# Patient Record
Sex: FEMALE | Race: WHITE | NOT HISPANIC OR LATINO | Employment: OTHER | URBAN - METROPOLITAN AREA
[De-identification: names, ages, dates, MRNs, and addresses within clinical notes are randomized per-mention and may not be internally consistent; named-entity substitution may affect disease eponyms.]

---

## 2017-01-25 ENCOUNTER — OFFICE VISIT (OUTPATIENT)
Dept: CARDIOLOGY | Facility: MEDICAL CENTER | Age: 82
End: 2017-01-25
Payer: MEDICARE

## 2017-01-25 VITALS
OXYGEN SATURATION: 96 % | BODY MASS INDEX: 29.83 KG/M2 | DIASTOLIC BLOOD PRESSURE: 90 MMHG | SYSTOLIC BLOOD PRESSURE: 178 MMHG | WEIGHT: 158 LBS | HEART RATE: 54 BPM | HEIGHT: 61 IN

## 2017-01-25 DIAGNOSIS — E78.2 MIXED HYPERLIPIDEMIA: ICD-10-CM

## 2017-01-25 DIAGNOSIS — Z95.5 STENTED CORONARY ARTERY: ICD-10-CM

## 2017-01-25 DIAGNOSIS — I10 BENIGN ESSENTIAL HYPERTENSION: ICD-10-CM

## 2017-01-25 DIAGNOSIS — I34.0 NON-RHEUMATIC MITRAL REGURGITATION: ICD-10-CM

## 2017-01-25 DIAGNOSIS — I25.10 CORONARY ARTERIOSCLEROSIS: ICD-10-CM

## 2017-01-25 PROCEDURE — G8598 ASA/ANTIPLAT THER USED: HCPCS | Performed by: INTERNAL MEDICINE

## 2017-01-25 PROCEDURE — G8420 CALC BMI NORM PARAMETERS: HCPCS | Performed by: INTERNAL MEDICINE

## 2017-01-25 PROCEDURE — 1101F PT FALLS ASSESS-DOCD LE1/YR: CPT | Mod: 8P | Performed by: INTERNAL MEDICINE

## 2017-01-25 PROCEDURE — G8432 DEP SCR NOT DOC, RNG: HCPCS | Performed by: INTERNAL MEDICINE

## 2017-01-25 PROCEDURE — 1036F TOBACCO NON-USER: CPT | Performed by: INTERNAL MEDICINE

## 2017-01-25 PROCEDURE — 99214 OFFICE O/P EST MOD 30 MIN: CPT | Performed by: INTERNAL MEDICINE

## 2017-01-25 PROCEDURE — G8484 FLU IMMUNIZE NO ADMIN: HCPCS | Performed by: INTERNAL MEDICINE

## 2017-01-25 PROCEDURE — 4040F PNEUMOC VAC/ADMIN/RCVD: CPT | Mod: 8P | Performed by: INTERNAL MEDICINE

## 2017-01-25 ASSESSMENT — ENCOUNTER SYMPTOMS
WEAKNESS: 0
NEUROLOGICAL NEGATIVE: 1
PND: 0
STRIDOR: 0
SHORTNESS OF BREATH: 0
CONSTITUTIONAL NEGATIVE: 1
CHILLS: 0
GASTROINTESTINAL NEGATIVE: 1
LOSS OF CONSCIOUSNESS: 0
HEMOPTYSIS: 0
SORE THROAT: 0
CARDIOVASCULAR NEGATIVE: 1
MUSCULOSKELETAL NEGATIVE: 1
PALPITATIONS: 0
RESPIRATORY NEGATIVE: 1
WHEEZING: 0
FEVER: 0
CLAUDICATION: 0
EYES NEGATIVE: 1
ORTHOPNEA: 0
DIZZINESS: 0
BRUISES/BLEEDS EASILY: 0
SPUTUM PRODUCTION: 0
COUGH: 0

## 2017-01-25 NOTE — Clinical Note
[facility]   [address]  [place] [address]  Phone: [phone]  Fax: [phone]              Arianne Irving  11/3/1931    Encounter Date: 1/25/2017    Alonzo Flor M.D.          PROGRESS NOTE:  Subjective:   Arianne Irving is a 85 y.o. female who presents today as follow-up for her hypertension hyperlipidemia and coronary artery disease. Since she was last seen she was also have some labs checked which were never done. She is also concerned about her blood pressure being mildly elevated today. I rechecked the blood pressure which was 164/90. Her creatinine at her last lab check was 2.0 which was different from her baseline which was normal before. She's never been told that she has anything wrong with her kidneys. His chest pain palpitations PND orthopnea or lower extremity edema.    Past Medical History   Diagnosis Date   • HTN (hypertension)    • HLD (hyperlipidemia)    • Obesity     • Edema     • CAD (coronary artery disease) May 2002     PCI/stent (Penta 3.0 x 28mm) to the LAD.     Past Surgical History   Procedure Laterality Date   • Hysterectomy, total abdominal     • Rotator cuff repair     • Tonsillectomy and adenoidectomy     • Knee arthroscopy       Family History   Problem Relation Age of Onset   • Heart Disease Brother      History   Smoking status   • Former Smoker   • Types: Cigarettes   • Quit date: 01/01/1967   Smokeless tobacco   • Never Used     Allergies   Allergen Reactions   • Nkda [No Known Drug Allergy]      Outpatient Encounter Prescriptions as of 1/25/2017   Medication Sig Dispense Refill   • potassium chloride SA (K-DUR) 20 MEQ Tab CR Take 1 Tab by mouth every day. 90 Tab 3   • losartan-hydrochlorothiazide (HYZAAR) 100-12.5 MG per tablet Take 1 Tab by mouth every day. 90 Tab 3   • metoprolol SR (TOPROL XL) 25 MG TABLET SR 24 HR Take 1 Tab by mouth every day. 90 Tab 3   • Nisoldipine 17 MG TABLET SR 24 HR TAKE 1 TABLET  "BY MOUTH EVERY DAY 90 Tab 3   • meloxicam (MOBIC) 15 MG tablet      • trazodone (DESYREL) 50 MG Tab      • rivastigmine (EXELON) 9.5 MG/24HR patch      • NAMENDA XR 28 MG CAPSULE SR 24 HR      • estradiol (ESTRACE) 0.5 MG tablet Take 0.5 mg by mouth every day.     • nitroglycerin (NITROSTAT) 0.4 MG SUBL Place 1 Tab under tongue as needed for Chest Pain. 25 Tab 3   • aspirin (ASA) 81 MG CHEW Take 81 mg by mouth every day.     • esomeprazole (NEXIUM) 40 MG capsule Take 40 mg by mouth every morning before breakfast.     • paroxetine (PAXIL) 10 MG TABS Take 10 mg by mouth every day. Take with food        No facility-administered encounter medications on file as of 1/25/2017.     Review of Systems   Constitutional: Negative.  Negative for fever, chills and malaise/fatigue.   HENT: Negative.  Negative for sore throat.    Eyes: Negative.    Respiratory: Negative.  Negative for cough, hemoptysis, sputum production, shortness of breath, wheezing and stridor.    Cardiovascular: Negative.  Negative for chest pain, palpitations, orthopnea, claudication, leg swelling and PND.   Gastrointestinal: Negative.    Genitourinary: Negative.    Musculoskeletal: Negative.    Skin: Negative.    Neurological: Negative.  Negative for dizziness, loss of consciousness and weakness.   Endo/Heme/Allergies: Negative.  Does not bruise/bleed easily.   All other systems reviewed and are negative.       Objective:   /90 mmHg  Pulse 54  Ht 1.549 m (5' 1\")  Wt 71.668 kg (158 lb)  BMI 29.87 kg/m2  SpO2 96%    Physical Exam   Constitutional: She is oriented to person, place, and time. She appears well-developed and well-nourished. No distress.   HENT:   Head: Normocephalic.   Mouth/Throat: Oropharynx is clear and moist.   Eyes: EOM are normal. Pupils are equal, round, and reactive to light. Right eye exhibits no discharge. Left eye exhibits no discharge. No scleral icterus.   Neck: Normal range of motion. Neck supple. No JVD present. No " tracheal deviation present.   Cardiovascular: Normal rate, regular rhythm, S1 normal, S2 normal, normal heart sounds, intact distal pulses and normal pulses.  Exam reveals no gallop, no S3, no S4 and no friction rub.    No murmur heard.   No systolic murmur is present    No diastolic murmur is present   Pulses:       Carotid pulses are 2+ on the right side, and 2+ on the left side.       Radial pulses are 2+ on the right side, and 2+ on the left side.        Dorsalis pedis pulses are 2+ on the right side, and 2+ on the left side.        Posterior tibial pulses are 2+ on the right side, and 2+ on the left side.   Pulmonary/Chest: Effort normal and breath sounds normal. No respiratory distress. She has no wheezes. She has no rales.   Abdominal: Soft. Bowel sounds are normal. She exhibits no distension and no mass. There is no tenderness. There is no rebound and no guarding.   Musculoskeletal: She exhibits no edema.   Neurological: She is alert and oriented to person, place, and time. No cranial nerve deficit.   Skin: Skin is warm and dry. She is not diaphoretic. No pallor.   Psychiatric: She has a normal mood and affect. Her behavior is normal. Judgment and thought content normal.   Nursing note and vitals reviewed.      Assessment:     1. Non-rheumatic mitral regurgitation  BASIC METABOLIC PANEL    LIPID PROFILE    CBC W/ DIFF W/O PLATELETS   2. Stented coronary artery  BASIC METABOLIC PANEL    LIPID PROFILE    CBC W/ DIFF W/O PLATELETS   3. Benign essential hypertension  BASIC METABOLIC PANEL    LIPID PROFILE    CBC W/ DIFF W/O PLATELETS   4. Mixed hyperlipidemia     5. Coronary arteriosclerosis         Medical Decision Making:  Today's Assessment / Status / Plan:     85-year-old female with CAD status post stent with some mild dementia and high blood pressure today. I did ask him to start checking her blood pressure twice per day. We will also check some baseline labs. We will have a discussion about the risks and  benefits of cholesterol lowering medications at her next visit.    Thank for you allowing me to take part in your patient's care, please call should you have any questions or would like to discuss this patient.      Elli Dickerson M.D.  [address] [facility]  [address] [address]  VIA Facsimile: [phone]

## 2017-01-25 NOTE — MR AVS SNAPSHOT
"        Arianne Irving   2017 4:00 PM   Office Visit   MRN: [id number]    Department:  [facility]   Dept Phone:  [phone]    Description:  Female : 11/3/1931   Provider:  Alonzo Flor M.D.           Reason for Visit     New Patient           Allergies as of 2017     Allergen Noted Reactions    Nkda [No Known Drug Allergy] 2011         You were diagnosed with     Non-rheumatic mitral regurgitation   [524688]       Stented coronary artery   [141108]       Benign essential hypertension   [226880]       Mixed hyperlipidemia   [272.2.ICD-9-CM]       Coronary arteriosclerosis   [408866]         Vital Signs     Blood Pressure Pulse Height Weight Body Mass Index Oxygen Saturation    178/90 mmHg 54 1.549 m (5' 1\") 71.668 kg (158 lb) 29.87 kg/m2 96%    Smoking Status                   Former Smoker           Basic Information     Date Of Birth Sex Race Ethnicity Preferred Language    11/3/1931 Female White Non- English      Problem List              ICD-10-CM Priority Class Noted - Resolved    Benign essential hypertension I10 High  2011 - Present    Mixed hyperlipidemia E78.2 High  2011 - Present    Coronary arteriosclerosis I25.10 High  2011 - Present    Obesity E66.9 High  2011 - Present    Edema R60.9 High  2011 - Present    Mitral regurgitation I34.0 High  2012 - Present    Jaw pain R68.84   2013 - Present    Stented coronary artery Z95.5   3/3/2016 - Present      Health Maintenance        Date Due Completion Dates    IMM DTaP/Tdap/Td Vaccine (1 - Tdap) 11/3/1950 ---    PAP SMEAR 11/3/1952 ---    COLONOSCOPY 11/3/1981 ---    IMM ZOSTER VACCINE 11/3/1991 ---    BONE DENSITY 11/3/1996 ---    IMM PNEUMOCOCCAL 65+ (ADULT) LOW/MEDIUM RISK SERIES (1 of 2 - PCV13) 11/3/1996 ---    MAMMOGRAM 2009, 2008, 2006, 5/3/2005    IMM INFLUENZA (1) 2016 ---            Current Immunizations     No immunizations on file.      Below " and/or attached are the medications your provider expects you to take. Review all of your home medications and newly ordered medications with your provider and/or pharmacist. Follow medication instructions as directed by your provider and/or pharmacist. Please keep your medication list with you and share with your provider. Update the information when medications are discontinued, doses are changed, or new medications (including over-the-counter products) are added; and carry medication information at all times in the event of emergency situations     Allergies:  NKDA - (reactions not documented)               Medications  Valid as of: January 25, 2017 -  4:30 PM    Generic Name Brand Name Tablet Size Instructions for use    Aspirin (Chew Tab) ASA 81 MG Take 81 mg by mouth every day.        Esomeprazole Magnesium (CAPSULE DELAYED RELEASE) NEXIUM 40 MG Take 40 mg by mouth every morning before breakfast.        Estradiol (Tab) ESTRACE 0.5 MG Take 0.5 mg by mouth every day.        Losartan Potassium-HCTZ (Tab) HYZAAR 100-12.5 MG Take 1 Tab by mouth every day.        Meloxicam (Tab) MOBIC 15 MG         Memantine HCl (CAPSULE SR 24 HR) NAMENDA XR 28 MG         Metoprolol Succinate (TABLET SR 24 HR) TOPROL XL 25 MG Take 1 Tab by mouth every day.        Nisoldipine (TABLET SR 24 HR) Nisoldipine 17 MG TAKE 1 TABLET BY MOUTH EVERY DAY        Nitroglycerin (SL Tab) NITROSTAT 0.4 MG Place 1 Tab under tongue as needed for Chest Pain.        PARoxetine HCl (Tab) PAXIL 10 MG Take 10 mg by mouth every day. Take with food         Potassium Chloride Alysha CR (Tab CR) Kdur 20 MEQ Take 1 Tab by mouth every day.        Rivastigmine (PATCH 24 HR) EXELON 9.5 MG/24HR         TraZODone HCl (Tab) DESYREL 50 MG         .                 Medicines prescribed today were sent to:     [facility] [place] - [address]    [facility] [place] [address]    Phone: [phone] Fax: [phone]    Open 24 Hours?: No     [facility] [address] [facility] - [address]  AT [facility] [address]    [address] [place] [address]    Phone: [phone] Fax: [phone]    Open 24 Hours?: No      Medication refill instructions:       If your prescription bottle indicates you have medication refills left, it is not necessary to call your provider’s office. Please contact your pharmacy and they will refill your medication.    If your prescription bottle indicates you do not have any refills left, you may request refills at any time through one of the following ways: The online [facility] system (except Urgent Care), by calling your provider’s office, or by asking your pharmacy to contact your provider’s office with a refill request. Medication refills are processed only during regular business hours and may not be available until the next business day. Your provider may request additional information or to have a follow-up visit with you prior to refilling your medication.   *Please Note: Medication refills are assigned a new Rx number when refilled electronically. Your pharmacy may indicate that no refills were authorized even though a new prescription for the same medication is available at the pharmacy. Please request the medicine by name with the pharmacy before contacting your provider for a refill.        Your To Do List     Future Labs/Procedures Complete By Expires    BASIC METABOLIC PANEL  As directed 1/26/2018    CBC W/ DIFF W/O PLATELETS  As directed 1/26/2018    LIPID PROFILE  As directed 1/26/2018         [facility] Access Code: Activation code not generated  Current [facility] Status: Active

## 2017-01-26 NOTE — PROGRESS NOTES
Subjective:   Arianne Irving is a 85 y.o. female who presents today as follow-up for her hypertension hyperlipidemia and coronary artery disease. Since she was last seen she was also have some labs checked which were never done. She is also concerned about her blood pressure being mildly elevated today. I rechecked the blood pressure which was 164/90. Her creatinine at her last lab check was 2.0 which was different from her baseline which was normal before. She's never been told that she has anything wrong with her kidneys. His chest pain palpitations PND orthopnea or lower extremity edema.    Past Medical History   Diagnosis Date   • HTN (hypertension)    • HLD (hyperlipidemia)    • Obesity     • Edema     • CAD (coronary artery disease) May 2002     PCI/stent (Penta 3.0 x 28mm) to the LAD.     Past Surgical History   Procedure Laterality Date   • Hysterectomy, total abdominal     • Rotator cuff repair     • Tonsillectomy and adenoidectomy     • Knee arthroscopy       Family History   Problem Relation Age of Onset   • Heart Disease Brother      History   Smoking status   • Former Smoker   • Types: Cigarettes   • Quit date: 01/01/1967   Smokeless tobacco   • Never Used     Allergies   Allergen Reactions   • Nkda [No Known Drug Allergy]      Outpatient Encounter Prescriptions as of 1/25/2017   Medication Sig Dispense Refill   • potassium chloride SA (K-DUR) 20 MEQ Tab CR Take 1 Tab by mouth every day. 90 Tab 3   • losartan-hydrochlorothiazide (HYZAAR) 100-12.5 MG per tablet Take 1 Tab by mouth every day. 90 Tab 3   • metoprolol SR (TOPROL XL) 25 MG TABLET SR 24 HR Take 1 Tab by mouth every day. 90 Tab 3   • Nisoldipine 17 MG TABLET SR 24 HR TAKE 1 TABLET BY MOUTH EVERY DAY 90 Tab 3   • meloxicam (MOBIC) 15 MG tablet      • trazodone (DESYREL) 50 MG Tab      • rivastigmine (EXELON) 9.5 MG/24HR patch      • NAMENDA XR 28 MG CAPSULE SR 24 HR      • estradiol (ESTRACE) 0.5 MG tablet Take 0.5 mg by mouth every day.     •  "nitroglycerin (NITROSTAT) 0.4 MG SUBL Place 1 Tab under tongue as needed for Chest Pain. 25 Tab 3   • aspirin (ASA) 81 MG CHEW Take 81 mg by mouth every day.     • esomeprazole (NEXIUM) 40 MG capsule Take 40 mg by mouth every morning before breakfast.     • paroxetine (PAXIL) 10 MG TABS Take 10 mg by mouth every day. Take with food        No facility-administered encounter medications on file as of 1/25/2017.     Review of Systems   Constitutional: Negative.  Negative for fever, chills and malaise/fatigue.   HENT: Negative.  Negative for sore throat.    Eyes: Negative.    Respiratory: Negative.  Negative for cough, hemoptysis, sputum production, shortness of breath, wheezing and stridor.    Cardiovascular: Negative.  Negative for chest pain, palpitations, orthopnea, claudication, leg swelling and PND.   Gastrointestinal: Negative.    Genitourinary: Negative.    Musculoskeletal: Negative.    Skin: Negative.    Neurological: Negative.  Negative for dizziness, loss of consciousness and weakness.   Endo/Heme/Allergies: Negative.  Does not bruise/bleed easily.   All other systems reviewed and are negative.       Objective:   /90 mmHg  Pulse 54  Ht 1.549 m (5' 1\")  Wt 71.668 kg (158 lb)  BMI 29.87 kg/m2  SpO2 96%    Physical Exam   Constitutional: She is oriented to person, place, and time. She appears well-developed and well-nourished. No distress.   HENT:   Head: Normocephalic.   Mouth/Throat: Oropharynx is clear and moist.   Eyes: EOM are normal. Pupils are equal, round, and reactive to light. Right eye exhibits no discharge. Left eye exhibits no discharge. No scleral icterus.   Neck: Normal range of motion. Neck supple. No JVD present. No tracheal deviation present.   Cardiovascular: Normal rate, regular rhythm, S1 normal, S2 normal, normal heart sounds, intact distal pulses and normal pulses.  Exam reveals no gallop, no S3, no S4 and no friction rub.    No murmur heard.   No systolic murmur is present    " No diastolic murmur is present   Pulses:       Carotid pulses are 2+ on the right side, and 2+ on the left side.       Radial pulses are 2+ on the right side, and 2+ on the left side.        Dorsalis pedis pulses are 2+ on the right side, and 2+ on the left side.        Posterior tibial pulses are 2+ on the right side, and 2+ on the left side.   Pulmonary/Chest: Effort normal and breath sounds normal. No respiratory distress. She has no wheezes. She has no rales.   Abdominal: Soft. Bowel sounds are normal. She exhibits no distension and no mass. There is no tenderness. There is no rebound and no guarding.   Musculoskeletal: She exhibits no edema.   Neurological: She is alert and oriented to person, place, and time. No cranial nerve deficit.   Skin: Skin is warm and dry. She is not diaphoretic. No pallor.   Psychiatric: She has a normal mood and affect. Her behavior is normal. Judgment and thought content normal.   Nursing note and vitals reviewed.      Assessment:     1. Non-rheumatic mitral regurgitation  BASIC METABOLIC PANEL    LIPID PROFILE    CBC W/ DIFF W/O PLATELETS   2. Stented coronary artery  BASIC METABOLIC PANEL    LIPID PROFILE    CBC W/ DIFF W/O PLATELETS   3. Benign essential hypertension  BASIC METABOLIC PANEL    LIPID PROFILE    CBC W/ DIFF W/O PLATELETS   4. Mixed hyperlipidemia     5. Coronary arteriosclerosis         Medical Decision Making:  Today's Assessment / Status / Plan:     85-year-old female with CAD status post stent with some mild dementia and high blood pressure today. I did ask him to start checking her blood pressure twice per day. We will also check some baseline labs. We will have a discussion about the risks and benefits of cholesterol lowering medications at her next visit.    Thank for you allowing me to take part in your patient's care, please call should you have any questions or would like to discuss this patient.

## 2017-02-15 ENCOUNTER — HOSPITAL ENCOUNTER (OUTPATIENT)
Dept: LAB | Facility: MEDICAL CENTER | Age: 82
End: 2017-02-15
Attending: INTERNAL MEDICINE
Payer: MEDICARE

## 2017-02-15 DIAGNOSIS — I10 BENIGN ESSENTIAL HYPERTENSION: ICD-10-CM

## 2017-02-15 DIAGNOSIS — Z95.5 STENTED CORONARY ARTERY: ICD-10-CM

## 2017-02-15 DIAGNOSIS — I34.0 NON-RHEUMATIC MITRAL REGURGITATION: ICD-10-CM

## 2017-02-15 LAB
ANION GAP SERPL CALC-SCNC: 11 MMOL/L (ref 0–11.9)
BASOPHILS # BLD AUTO: 0.08 K/UL (ref 0–0.12)
BASOPHILS NFR BLD AUTO: 1.2 % (ref 0–1.8)
BUN SERPL-MCNC: 38 MG/DL (ref 8–22)
CALCIUM SERPL-MCNC: 9.9 MG/DL (ref 8.5–10.5)
CHLORIDE SERPL-SCNC: 100 MMOL/L (ref 96–112)
CHOLEST SERPL-MCNC: 277 MG/DL (ref 100–199)
CO2 SERPL-SCNC: 28 MMOL/L (ref 20–33)
CREAT SERPL-MCNC: 1.42 MG/DL (ref 0.5–1.4)
EOSINOPHIL # BLD: 0.15 K/UL (ref 0–0.51)
EOSINOPHIL NFR BLD AUTO: 2.2 % (ref 0–6.9)
ERYTHROCYTE [DISTWIDTH] IN BLOOD BY AUTOMATED COUNT: 42.5 FL (ref 35.9–50)
GLUCOSE SERPL-MCNC: 103 MG/DL (ref 65–99)
HCT VFR BLD AUTO: 40 % (ref 37–47)
HDLC SERPL-MCNC: 63 MG/DL
HGB BLD-MCNC: 13.4 G/DL (ref 12–16)
IMM GRANULOCYTES # BLD AUTO: 0.02 K/UL (ref 0–0.11)
IMM GRANULOCYTES NFR BLD AUTO: 0.3 % (ref 0–0.9)
LDLC SERPL CALC-MCNC: 184 MG/DL
LYMPHOCYTES # BLD: 1.8 K/UL (ref 1–4.8)
LYMPHOCYTES NFR BLD AUTO: 26.7 % (ref 22–41)
MCH RBC QN AUTO: 30.6 PG (ref 27–33)
MCHC RBC AUTO-ENTMCNC: 33.5 G/DL (ref 33.6–35)
MCV RBC AUTO: 91.3 FL (ref 81.4–97.8)
MONOCYTES # BLD: 0.51 K/UL (ref 0–0.85)
MONOCYTES NFR BLD AUTO: 7.6 % (ref 0–13.4)
NEUTROPHILS # BLD: 4.18 K/UL (ref 2–7.15)
NEUTROPHILS NFR BLD AUTO: 62 % (ref 44–72)
NRBC # BLD AUTO: 0 K/UL
NRBC BLD-RTO: 0 /100 WBC
POTASSIUM SERPL-SCNC: 3.4 MMOL/L (ref 3.6–5.5)
RBC # BLD AUTO: 4.38 M/UL (ref 4.2–5.4)
SODIUM SERPL-SCNC: 139 MMOL/L (ref 135–145)
TRIGL SERPL-MCNC: 152 MG/DL (ref 0–149)
WBC # BLD AUTO: 6.7 K/UL (ref 4.8–10.8)

## 2017-02-15 PROCEDURE — 85041 AUTOMATED RBC COUNT: CPT

## 2017-02-15 PROCEDURE — 85048 AUTOMATED LEUKOCYTE COUNT: CPT

## 2017-02-15 PROCEDURE — 36415 COLL VENOUS BLD VENIPUNCTURE: CPT

## 2017-02-15 PROCEDURE — 85014 HEMATOCRIT: CPT

## 2017-02-15 PROCEDURE — 80061 LIPID PANEL: CPT

## 2017-02-15 PROCEDURE — 85018 HEMOGLOBIN: CPT

## 2017-02-15 PROCEDURE — 80048 BASIC METABOLIC PNL TOTAL CA: CPT

## 2017-02-28 ENCOUNTER — OFFICE VISIT (OUTPATIENT)
Dept: CARDIOLOGY | Facility: MEDICAL CENTER | Age: 82
End: 2017-02-28
Payer: MEDICARE

## 2017-02-28 VITALS
SYSTOLIC BLOOD PRESSURE: 164 MMHG | OXYGEN SATURATION: 90 % | DIASTOLIC BLOOD PRESSURE: 80 MMHG | HEART RATE: 60 BPM | HEIGHT: 61 IN | BODY MASS INDEX: 30.4 KG/M2 | WEIGHT: 161 LBS

## 2017-02-28 DIAGNOSIS — I10 BENIGN ESSENTIAL HYPERTENSION: ICD-10-CM

## 2017-02-28 DIAGNOSIS — I25.10 CORONARY ARTERIOSCLEROSIS: ICD-10-CM

## 2017-02-28 DIAGNOSIS — E78.2 MIXED HYPERLIPIDEMIA: ICD-10-CM

## 2017-02-28 DIAGNOSIS — Z95.5 STENTED CORONARY ARTERY: ICD-10-CM

## 2017-02-28 DIAGNOSIS — R68.84 JAW PAIN: ICD-10-CM

## 2017-02-28 DIAGNOSIS — I34.0 NON-RHEUMATIC MITRAL REGURGITATION: ICD-10-CM

## 2017-02-28 PROCEDURE — 1101F PT FALLS ASSESS-DOCD LE1/YR: CPT | Mod: 8P | Performed by: INTERNAL MEDICINE

## 2017-02-28 PROCEDURE — G8419 CALC BMI OUT NRM PARAM NOF/U: HCPCS | Performed by: INTERNAL MEDICINE

## 2017-02-28 PROCEDURE — G8432 DEP SCR NOT DOC, RNG: HCPCS | Performed by: INTERNAL MEDICINE

## 2017-02-28 PROCEDURE — 1036F TOBACCO NON-USER: CPT | Performed by: INTERNAL MEDICINE

## 2017-02-28 PROCEDURE — 4040F PNEUMOC VAC/ADMIN/RCVD: CPT | Mod: 8P | Performed by: INTERNAL MEDICINE

## 2017-02-28 PROCEDURE — 99214 OFFICE O/P EST MOD 30 MIN: CPT | Performed by: INTERNAL MEDICINE

## 2017-02-28 PROCEDURE — G8484 FLU IMMUNIZE NO ADMIN: HCPCS | Performed by: INTERNAL MEDICINE

## 2017-02-28 PROCEDURE — G8598 ASA/ANTIPLAT THER USED: HCPCS | Performed by: INTERNAL MEDICINE

## 2017-02-28 RX ORDER — AMLODIPINE BESYLATE 2.5 MG/1
2.5 TABLET ORAL DAILY
Qty: 30 TAB | Refills: 11 | Status: SHIPPED | OUTPATIENT
Start: 2017-02-28 | End: 2018-03-02 | Stop reason: SDUPTHER

## 2017-02-28 RX ORDER — ATORVASTATIN CALCIUM 20 MG/1
20 TABLET, FILM COATED ORAL EVERY EVENING
Qty: 30 TAB | Refills: 11 | Status: SHIPPED | OUTPATIENT
Start: 2017-02-28 | End: 2018-03-16 | Stop reason: SDUPTHER

## 2017-02-28 ASSESSMENT — ENCOUNTER SYMPTOMS
SPUTUM PRODUCTION: 0
SORE THROAT: 0
MUSCULOSKELETAL NEGATIVE: 1
CONSTITUTIONAL NEGATIVE: 1
EYES NEGATIVE: 1
SHORTNESS OF BREATH: 0
ORTHOPNEA: 0
LOSS OF CONSCIOUSNESS: 0
STRIDOR: 0
RESPIRATORY NEGATIVE: 1
PND: 0
WEAKNESS: 0
HEMOPTYSIS: 0
COUGH: 0
NEUROLOGICAL NEGATIVE: 1
PALPITATIONS: 0
GASTROINTESTINAL NEGATIVE: 1
DIZZINESS: 0
CHILLS: 0
CLAUDICATION: 0
WHEEZING: 0
CARDIOVASCULAR NEGATIVE: 1
BRUISES/BLEEDS EASILY: 0
FEVER: 0

## 2017-02-28 NOTE — Clinical Note
[facility]   [address], [address]  [place] [address]  Phone: [phone]  Fax: [phone]              Arianne Irving  11/3/1931    Encounter Date: 2/28/2017    Alonzo Flor M.D.          PROGRESS NOTE:  Subjective:   Arianne Irving is a 85 y.o. female who presents today as a follow-up for her CAD status post stent as well as her hyperlipidemia. We checked her labs recently that showed a creatinine of 1.46. Her cholesterol was very elevated. Her blood pressure is slightly elevated. She is asymptomatic with all this.    Past Medical History   Diagnosis Date   • HTN (hypertension)    • HLD (hyperlipidemia)    • Obesity     • Edema     • CAD (coronary artery disease) May 2002     PCI/stent (Penta 3.0 x 28mm) to the LAD.     Past Surgical History   Procedure Laterality Date   • Hysterectomy, total abdominal     • Rotator cuff repair     • Tonsillectomy and adenoidectomy     • Knee arthroscopy       Family History   Problem Relation Age of Onset   • Heart Disease Brother      History   Smoking status   • Former Smoker   • Types: Cigarettes   • Quit date: 01/01/1967   Smokeless tobacco   • Never Used     Allergies   Allergen Reactions   • Nkda [No Known Drug Allergy]      Outpatient Encounter Prescriptions as of 2/28/2017   Medication Sig Dispense Refill   • atorvastatin (LIPITOR) 20 MG Tab Take 1 Tab by mouth every evening. 30 Tab 11   • amlodipine (NORVASC) 2.5 MG Tab Take 1 Tab by mouth every day. 30 Tab 11   • potassium chloride SA (K-DUR) 20 MEQ Tab CR Take 1 Tab by mouth every day. 90 Tab 3   • losartan-hydrochlorothiazide (HYZAAR) 100-12.5 MG per tablet Take 1 Tab by mouth every day. 90 Tab 3   • metoprolol SR (TOPROL XL) 25 MG TABLET SR 24 HR Take 1 Tab by mouth every day. 90 Tab 3   • Nisoldipine 17 MG TABLET SR 24 HR TAKE 1 TABLET BY MOUTH EVERY DAY 90 Tab 3   • meloxicam (MOBIC) 15 MG tablet      • trazodone (DESYREL) 50 MG Tab      • rivastigmine  "(EXELON) 9.5 MG/24HR patch      • NAMENDA XR 28 MG CAPSULE SR 24 HR      • estradiol (ESTRACE) 0.5 MG tablet Take 0.5 mg by mouth every day.     • nitroglycerin (NITROSTAT) 0.4 MG SUBL Place 1 Tab under tongue as needed for Chest Pain. 25 Tab 3   • aspirin (ASA) 81 MG CHEW Take 81 mg by mouth every day.     • esomeprazole (NEXIUM) 40 MG capsule Take 40 mg by mouth every morning before breakfast.     • paroxetine (PAXIL) 10 MG TABS Take 10 mg by mouth every day. Take with food        No facility-administered encounter medications on file as of 2/28/2017.     Review of Systems   Constitutional: Negative.  Negative for fever, chills and malaise/fatigue.   HENT: Negative.  Negative for sore throat.    Eyes: Negative.    Respiratory: Negative.  Negative for cough, hemoptysis, sputum production, shortness of breath, wheezing and stridor.    Cardiovascular: Negative.  Negative for chest pain, palpitations, orthopnea, claudication, leg swelling and PND.   Gastrointestinal: Negative.    Genitourinary: Negative.    Musculoskeletal: Negative.    Skin: Negative.    Neurological: Negative.  Negative for dizziness, loss of consciousness and weakness.   Endo/Heme/Allergies: Negative.  Does not bruise/bleed easily.   All other systems reviewed and are negative.       Objective:   /80 mmHg  Pulse 60  Ht 1.549 m (5' 1\")  Wt 73.029 kg (161 lb)  BMI 30.44 kg/m2  SpO2 90%    Physical Exam   Constitutional: She is oriented to person, place, and time. She appears well-developed and well-nourished. No distress.   HENT:   Head: Normocephalic.   Mouth/Throat: Oropharynx is clear and moist.   Eyes: EOM are normal. Pupils are equal, round, and reactive to light. Right eye exhibits no discharge. Left eye exhibits no discharge. No scleral icterus.   Neck: Normal range of motion. Neck supple. No JVD present. No tracheal deviation present.   Cardiovascular: Normal rate, regular rhythm, S1 normal, S2 normal, normal heart sounds, intact " distal pulses and normal pulses.  Exam reveals no gallop, no S3, no S4 and no friction rub.    No murmur heard.   No systolic murmur is present    No diastolic murmur is present   Pulses:       Carotid pulses are 2+ on the right side, and 2+ on the left side.       Radial pulses are 2+ on the right side, and 2+ on the left side.        Dorsalis pedis pulses are 2+ on the right side, and 2+ on the left side.        Posterior tibial pulses are 2+ on the right side, and 2+ on the left side.   Pulmonary/Chest: Effort normal and breath sounds normal. No respiratory distress. She has no wheezes. She has no rales.   Abdominal: Soft. Bowel sounds are normal. She exhibits no distension and no mass. There is no tenderness. There is no rebound and no guarding.   Musculoskeletal: She exhibits no edema.   Neurological: She is alert and oriented to person, place, and time. No cranial nerve deficit.   Skin: Skin is warm and dry. She is not diaphoretic. No pallor.   Psychiatric: She has a normal mood and affect. Her behavior is normal. Judgment and thought content normal.   Nursing note and vitals reviewed.      Assessment:     1. Stented coronary artery     2. Mixed hyperlipidemia  amlodipine (NORVASC) 2.5 MG Tab   3. Non-rheumatic mitral regurgitation     4. Jaw pain  atorvastatin (LIPITOR) 20 MG Tab   5. Coronary arteriosclerosis  atorvastatin (LIPITOR) 20 MG Tab    amlodipine (NORVASC) 2.5 MG Tab   6. Benign essential hypertension  atorvastatin (LIPITOR) 20 MG Tab    amlodipine (NORVASC) 2.5 MG Tab       Medical Decision Making:  Today's Assessment / Status / Plan:     85-year-old female with CAD status post stent and hyperlipidemia. I discussed the role of cholesterol lowering medications with both her and her daughter. Based upon the extremely elevated LDL and her risk for future heart attack versus her mild Alzheimer's they both like to have her start on something low dose. I will have her start on 10-20 of atorvastatin per  day. Additionally I will give her 2.5 mg per day of amlodipine to get her blood pressure slightly lower.    Thank for you allowing me to take part in your patient's care, please call should you have any questions or would like to discuss this patient.      Elli Dickerson M.D.  [address]  [address] [address]  VIA Facsimile: [phone]

## 2017-02-28 NOTE — PROGRESS NOTES
Subjective:   Arianne Irving is a 85 y.o. female who presents today as a follow-up for her CAD status post stent as well as her hyperlipidemia. We checked her labs recently that showed a creatinine of 1.46. Her cholesterol was very elevated. Her blood pressure is slightly elevated. She is asymptomatic with all this.    Past Medical History   Diagnosis Date   • HTN (hypertension)    • HLD (hyperlipidemia)    • Obesity     • Edema     • CAD (coronary artery disease) May 2002     PCI/stent (Penta 3.0 x 28mm) to the LAD.     Past Surgical History   Procedure Laterality Date   • Hysterectomy, total abdominal     • Rotator cuff repair     • Tonsillectomy and adenoidectomy     • Knee arthroscopy       Family History   Problem Relation Age of Onset   • Heart Disease Brother      History   Smoking status   • Former Smoker   • Types: Cigarettes   • Quit date: 01/01/1967   Smokeless tobacco   • Never Used     Allergies   Allergen Reactions   • Nkda [No Known Drug Allergy]      Outpatient Encounter Prescriptions as of 2/28/2017   Medication Sig Dispense Refill   • atorvastatin (LIPITOR) 20 MG Tab Take 1 Tab by mouth every evening. 30 Tab 11   • amlodipine (NORVASC) 2.5 MG Tab Take 1 Tab by mouth every day. 30 Tab 11   • potassium chloride SA (K-DUR) 20 MEQ Tab CR Take 1 Tab by mouth every day. 90 Tab 3   • losartan-hydrochlorothiazide (HYZAAR) 100-12.5 MG per tablet Take 1 Tab by mouth every day. 90 Tab 3   • metoprolol SR (TOPROL XL) 25 MG TABLET SR 24 HR Take 1 Tab by mouth every day. 90 Tab 3   • Nisoldipine 17 MG TABLET SR 24 HR TAKE 1 TABLET BY MOUTH EVERY DAY 90 Tab 3   • meloxicam (MOBIC) 15 MG tablet      • trazodone (DESYREL) 50 MG Tab      • rivastigmine (EXELON) 9.5 MG/24HR patch      • NAMENDA XR 28 MG CAPSULE SR 24 HR      • estradiol (ESTRACE) 0.5 MG tablet Take 0.5 mg by mouth every day.     • nitroglycerin (NITROSTAT) 0.4 MG SUBL Place 1 Tab under tongue as needed for Chest Pain. 25 Tab 3   • aspirin (ASA) 81 MG  "CHEW Take 81 mg by mouth every day.     • esomeprazole (NEXIUM) 40 MG capsule Take 40 mg by mouth every morning before breakfast.     • paroxetine (PAXIL) 10 MG TABS Take 10 mg by mouth every day. Take with food        No facility-administered encounter medications on file as of 2/28/2017.     Review of Systems   Constitutional: Negative.  Negative for fever, chills and malaise/fatigue.   HENT: Negative.  Negative for sore throat.    Eyes: Negative.    Respiratory: Negative.  Negative for cough, hemoptysis, sputum production, shortness of breath, wheezing and stridor.    Cardiovascular: Negative.  Negative for chest pain, palpitations, orthopnea, claudication, leg swelling and PND.   Gastrointestinal: Negative.    Genitourinary: Negative.    Musculoskeletal: Negative.    Skin: Negative.    Neurological: Negative.  Negative for dizziness, loss of consciousness and weakness.   Endo/Heme/Allergies: Negative.  Does not bruise/bleed easily.   All other systems reviewed and are negative.       Objective:   /80 mmHg  Pulse 60  Ht 1.549 m (5' 1\")  Wt 73.029 kg (161 lb)  BMI 30.44 kg/m2  SpO2 90%    Physical Exam   Constitutional: She is oriented to person, place, and time. She appears well-developed and well-nourished. No distress.   HENT:   Head: Normocephalic.   Mouth/Throat: Oropharynx is clear and moist.   Eyes: EOM are normal. Pupils are equal, round, and reactive to light. Right eye exhibits no discharge. Left eye exhibits no discharge. No scleral icterus.   Neck: Normal range of motion. Neck supple. No JVD present. No tracheal deviation present.   Cardiovascular: Normal rate, regular rhythm, S1 normal, S2 normal, normal heart sounds, intact distal pulses and normal pulses.  Exam reveals no gallop, no S3, no S4 and no friction rub.    No murmur heard.   No systolic murmur is present    No diastolic murmur is present   Pulses:       Carotid pulses are 2+ on the right side, and 2+ on the left side.       " Radial pulses are 2+ on the right side, and 2+ on the left side.        Dorsalis pedis pulses are 2+ on the right side, and 2+ on the left side.        Posterior tibial pulses are 2+ on the right side, and 2+ on the left side.   Pulmonary/Chest: Effort normal and breath sounds normal. No respiratory distress. She has no wheezes. She has no rales.   Abdominal: Soft. Bowel sounds are normal. She exhibits no distension and no mass. There is no tenderness. There is no rebound and no guarding.   Musculoskeletal: She exhibits no edema.   Neurological: She is alert and oriented to person, place, and time. No cranial nerve deficit.   Skin: Skin is warm and dry. She is not diaphoretic. No pallor.   Psychiatric: She has a normal mood and affect. Her behavior is normal. Judgment and thought content normal.   Nursing note and vitals reviewed.      Assessment:     1. Stented coronary artery     2. Mixed hyperlipidemia  amlodipine (NORVASC) 2.5 MG Tab   3. Non-rheumatic mitral regurgitation     4. Jaw pain  atorvastatin (LIPITOR) 20 MG Tab   5. Coronary arteriosclerosis  atorvastatin (LIPITOR) 20 MG Tab    amlodipine (NORVASC) 2.5 MG Tab   6. Benign essential hypertension  atorvastatin (LIPITOR) 20 MG Tab    amlodipine (NORVASC) 2.5 MG Tab       Medical Decision Making:  Today's Assessment / Status / Plan:     85-year-old female with CAD status post stent and hyperlipidemia. I discussed the role of cholesterol lowering medications with both her and her daughter. Based upon the extremely elevated LDL and her risk for future heart attack versus her mild Alzheimer's they both like to have her start on something low dose. I will have her start on 10-20 of atorvastatin per day. Additionally I will give her 2.5 mg per day of amlodipine to get her blood pressure slightly lower.    Thank for you allowing me to take part in your patient's care, please call should you have any questions or would like to discuss this patient.

## 2017-02-28 NOTE — MR AVS SNAPSHOT
"Arianne Irving   2017 1:45 PM   Office Visit   MRN: [id number]    Department:  [facility]   Dept Phone:  [phone]    Description:  Female : 11/3/1931   Provider:  Alonzo Flor M.D.           Reason for Visit     Follow-Up           Allergies as of 2017     Allergen Noted Reactions    Nkda [No Known Drug Allergy] 2011         You were diagnosed with     Stented coronary artery   [068057]       Mixed hyperlipidemia   [272.2.ICD-9-CM]       Non-rheumatic mitral regurgitation   [693642]       Jaw pain   [784.92.ICD-9-CM]       Coronary arteriosclerosis   [880545]       Benign essential hypertension   [952622]         Vital Signs     Blood Pressure Pulse Height Weight Body Mass Index Oxygen Saturation    164/80 mmHg 60 1.549 m (5' 1\") 73.029 kg (161 lb) 30.44 kg/m2 90%    Smoking Status                   Former Smoker           Basic Information     Date Of Birth Sex Race Ethnicity Preferred Language    11/3/1931 Female White Non- English      Problem List              ICD-10-CM Priority Class Noted - Resolved    Benign essential hypertension I10 High  2011 - Present    Mixed hyperlipidemia E78.2 High  2011 - Present    Coronary arteriosclerosis I25.10 High  2011 - Present    Obesity E66.9 High  2011 - Present    Edema R60.9 High  2011 - Present    Mitral regurgitation I34.0 High  2012 - Present    Jaw pain R68.84   2013 - Present    Stented coronary artery Z95.5   3/3/2016 - Present      Health Maintenance        Date Due Completion Dates    IMM DTaP/Tdap/Td Vaccine (1 - Tdap) 11/3/1950 ---    PAP SMEAR 11/3/1952 ---    COLONOSCOPY 11/3/1981 ---    IMM ZOSTER VACCINE 11/3/1991 ---    BONE DENSITY 11/3/1996 ---    IMM PNEUMOCOCCAL 65+ (ADULT) LOW/MEDIUM RISK SERIES (1 of 2 - PCV13) 11/3/1996 ---    MAMMOGRAM 2009, 2008, 2006, 5/3/2005    IMM INFLUENZA (1) 2016 ---            Current Immunizations     No " immunizations on file.      Below and/or attached are the medications your provider expects you to take. Review all of your home medications and newly ordered medications with your provider and/or pharmacist. Follow medication instructions as directed by your provider and/or pharmacist. Please keep your medication list with you and share with your provider. Update the information when medications are discontinued, doses are changed, or new medications (including over-the-counter products) are added; and carry medication information at all times in the event of emergency situations     Allergies:  NKDA - (reactions not documented)               Medications  Valid as of: February 28, 2017 -  2:08 PM    Generic Name Brand Name Tablet Size Instructions for use    AmLODIPine Besylate (Tab) NORVASC 2.5 MG Take 1 Tab by mouth every day.        Aspirin (Chew Tab) ASA 81 MG Take 81 mg by mouth every day.        Atorvastatin Calcium (Tab) LIPITOR 20 MG Take 1 Tab by mouth every evening.        Esomeprazole Magnesium (CAPSULE DELAYED RELEASE) NEXIUM 40 MG Take 40 mg by mouth every morning before breakfast.        Estradiol (Tab) ESTRACE 0.5 MG Take 0.5 mg by mouth every day.        Losartan Potassium-HCTZ (Tab) HYZAAR 100-12.5 MG Take 1 Tab by mouth every day.        Meloxicam (Tab) MOBIC 15 MG         Memantine HCl (CAPSULE SR 24 HR) NAMENDA XR 28 MG         Metoprolol Succinate (TABLET SR 24 HR) TOPROL XL 25 MG Take 1 Tab by mouth every day.        Nisoldipine (TABLET SR 24 HR) Nisoldipine 17 MG TAKE 1 TABLET BY MOUTH EVERY DAY        Nitroglycerin (SL Tab) NITROSTAT 0.4 MG Place 1 Tab under tongue as needed for Chest Pain.        PARoxetine HCl (Tab) PAXIL 10 MG Take 10 mg by mouth every day. Take with food         Potassium Chloride Alysha CR (Tab CR) Kdur 20 MEQ Take 1 Tab by mouth every day.        Rivastigmine (PATCH 24 HR) EXELON 9.5 MG/24HR         TraZODone HCl (Tab) DESYREL 50 MG         .                 Medicines  prescribed today were sent to:     [id number] - [place] - [address]    [address] [place] [address]    Phone: [phone] Fax: [phone]    Open 24 Hours?: No    [id number] - [place], [place] - [address] AT [facility] [address] [facility]    [address] [place] [address]    Phone: [phone] Fax: [phone]    Open 24 Hours?: No      Medication refill instructions:       If your prescription bottle indicates you have medication refills left, it is not necessary to call your provider’s office. Please contact your pharmacy and they will refill your medication.    If your prescription bottle indicates you do not have any refills left, you may request refills at any time through one of the following ways: The online Hangtime system (except Urgent Care), by calling your provider’s office, or by asking your pharmacy to contact your provider’s office with a refill request. Medication refills are processed only during regular business hours and may not be available until the next business day. Your provider may request additional information or to have a follow-up visit with you prior to refilling your medication.   *Please Note: Medication refills are assigned a new Rx number when refilled electronically. Your pharmacy may indicate that no refills were authorized even though a new prescription for the same medication is available at the pharmacy. Please request the medicine by name with the pharmacy before contacting your provider for a refill.           Hangtime Access Code: Activation code not generated  Current Hangtime Status: Active

## 2017-05-17 DIAGNOSIS — I10 ESSENTIAL HYPERTENSION: ICD-10-CM

## 2017-05-17 DIAGNOSIS — E87.6 HYPOKALEMIA: ICD-10-CM

## 2017-05-17 RX ORDER — LOSARTAN POTASSIUM AND HYDROCHLOROTHIAZIDE 12.5; 1 MG/1; MG/1
1 TABLET ORAL DAILY
Qty: 90 TAB | Refills: 3 | Status: SHIPPED | OUTPATIENT
Start: 2017-05-17 | End: 2018-05-31 | Stop reason: SDUPTHER

## 2017-05-17 RX ORDER — METOPROLOL SUCCINATE 25 MG/1
25 TABLET, EXTENDED RELEASE ORAL DAILY
Qty: 90 TAB | Refills: 3 | Status: SHIPPED | OUTPATIENT
Start: 2017-05-17 | End: 2018-05-31 | Stop reason: SDUPTHER

## 2017-05-17 RX ORDER — POTASSIUM CHLORIDE 20 MEQ/1
20 TABLET, EXTENDED RELEASE ORAL DAILY
Qty: 90 TAB | Refills: 3 | Status: SHIPPED | OUTPATIENT
Start: 2017-05-17 | End: 2018-05-31 | Stop reason: SDUPTHER

## 2017-09-01 ENCOUNTER — HOSPITAL ENCOUNTER (OUTPATIENT)
Dept: LAB | Facility: MEDICAL CENTER | Age: 82
End: 2017-09-01
Attending: FAMILY MEDICINE
Payer: MEDICARE

## 2017-09-01 LAB — GFR SERPL CREATININE-BSD FRML MDRD: 40 ML/MIN/1.73 M 2

## 2017-09-01 PROCEDURE — 81001 URINALYSIS AUTO W/SCOPE: CPT

## 2017-09-01 PROCEDURE — 87086 URINE CULTURE/COLONY COUNT: CPT

## 2017-09-01 PROCEDURE — 83735 ASSAY OF MAGNESIUM: CPT | Mod: GA

## 2017-09-01 PROCEDURE — 87389 HIV-1 AG W/HIV-1&-2 AB AG IA: CPT

## 2017-09-01 PROCEDURE — 83036 HEMOGLOBIN GLYCOSYLATED A1C: CPT | Mod: GA

## 2017-09-01 PROCEDURE — 85025 COMPLETE CBC W/AUTO DIFF WBC: CPT

## 2017-09-01 PROCEDURE — 82607 VITAMIN B-12: CPT

## 2017-09-01 PROCEDURE — 36415 COLL VENOUS BLD VENIPUNCTURE: CPT | Mod: GA

## 2017-09-01 PROCEDURE — 85652 RBC SED RATE AUTOMATED: CPT

## 2017-09-01 PROCEDURE — 82746 ASSAY OF FOLIC ACID SERUM: CPT

## 2017-09-01 PROCEDURE — 86780 TREPONEMA PALLIDUM: CPT | Mod: GA

## 2017-09-01 PROCEDURE — 80053 COMPREHEN METABOLIC PANEL: CPT

## 2017-09-02 LAB
ALBUMIN SERPL BCP-MCNC: 4 G/DL (ref 3.2–4.9)
ALBUMIN/GLOB SERPL: 1.7 G/DL
ALP SERPL-CCNC: 50 U/L (ref 30–99)
ALT SERPL-CCNC: 11 U/L (ref 2–50)
ANION GAP SERPL CALC-SCNC: 9 MMOL/L (ref 0–11.9)
APPEARANCE UR: ABNORMAL
AST SERPL-CCNC: 15 U/L (ref 12–45)
BACTERIA #/AREA URNS HPF: ABNORMAL /HPF
BASOPHILS # BLD AUTO: 1.1 % (ref 0–1.8)
BASOPHILS # BLD: 0.07 K/UL (ref 0–0.12)
BILIRUB SERPL-MCNC: 0.7 MG/DL (ref 0.1–1.5)
BILIRUB UR QL STRIP.AUTO: NEGATIVE
BUN SERPL-MCNC: 26 MG/DL (ref 8–22)
CALCIUM SERPL-MCNC: 8.9 MG/DL (ref 8.5–10.5)
CHLORIDE SERPL-SCNC: 104 MMOL/L (ref 96–112)
CO2 SERPL-SCNC: 25 MMOL/L (ref 20–33)
COLOR UR: YELLOW
CREAT SERPL-MCNC: 1.26 MG/DL (ref 0.5–1.4)
CULTURE IF INDICATED INDCX: YES UA CULTURE
EOSINOPHIL # BLD AUTO: 0.12 K/UL (ref 0–0.51)
EOSINOPHIL NFR BLD: 1.9 % (ref 0–6.9)
EPI CELLS #/AREA URNS HPF: ABNORMAL /HPF
ERYTHROCYTE [DISTWIDTH] IN BLOOD BY AUTOMATED COUNT: 42.5 FL (ref 35.9–50)
ERYTHROCYTE [SEDIMENTATION RATE] IN BLOOD BY WESTERGREN METHOD: 10 MM/HOUR (ref 0–30)
EST. AVERAGE GLUCOSE BLD GHB EST-MCNC: 131 MG/DL
FOLATE SERPL-MCNC: 14.1 NG/ML
GLOBULIN SER CALC-MCNC: 2.4 G/DL (ref 1.9–3.5)
GLUCOSE SERPL-MCNC: 118 MG/DL (ref 65–99)
GLUCOSE UR STRIP.AUTO-MCNC: NEGATIVE MG/DL
HBA1C MFR BLD: 6.2 % (ref 0–5.6)
HCT VFR BLD AUTO: 39 % (ref 37–47)
HGB BLD-MCNC: 12.7 G/DL (ref 12–16)
HIV 1+2 AB+HIV1 P24 AG SERPL QL IA: NON REACTIVE
HYALINE CASTS #/AREA URNS LPF: ABNORMAL /LPF
IMM GRANULOCYTES # BLD AUTO: 0.02 K/UL (ref 0–0.11)
IMM GRANULOCYTES NFR BLD AUTO: 0.3 % (ref 0–0.9)
KETONES UR STRIP.AUTO-MCNC: NEGATIVE MG/DL
LEUKOCYTE ESTERASE UR QL STRIP.AUTO: ABNORMAL
LYMPHOCYTES # BLD AUTO: 1.69 K/UL (ref 1–4.8)
LYMPHOCYTES NFR BLD: 26.9 % (ref 22–41)
MAGNESIUM SERPL-MCNC: 1.9 MG/DL (ref 1.5–2.5)
MCH RBC QN AUTO: 30.1 PG (ref 27–33)
MCHC RBC AUTO-ENTMCNC: 32.6 G/DL (ref 33.6–35)
MCV RBC AUTO: 92.4 FL (ref 81.4–97.8)
MICRO URNS: ABNORMAL
MONOCYTES # BLD AUTO: 0.37 K/UL (ref 0–0.85)
MONOCYTES NFR BLD AUTO: 5.9 % (ref 0–13.4)
NEUTROPHILS # BLD AUTO: 4.02 K/UL (ref 2–7.15)
NEUTROPHILS NFR BLD: 63.9 % (ref 44–72)
NITRITE UR QL STRIP.AUTO: NEGATIVE
NRBC # BLD AUTO: 0 K/UL
NRBC BLD AUTO-RTO: 0 /100 WBC
PH UR STRIP.AUTO: 6.5 [PH]
PLATELET # BLD AUTO: 133 K/UL (ref 164–446)
PMV BLD AUTO: 11.2 FL (ref 9–12.9)
POTASSIUM SERPL-SCNC: 3.6 MMOL/L (ref 3.6–5.5)
PROT SERPL-MCNC: 6.4 G/DL (ref 6–8.2)
PROT UR QL STRIP: NEGATIVE MG/DL
RBC # BLD AUTO: 4.22 M/UL (ref 4.2–5.4)
RBC # URNS HPF: ABNORMAL /HPF
RBC UR QL AUTO: NEGATIVE
SODIUM SERPL-SCNC: 138 MMOL/L (ref 135–145)
SP GR UR STRIP.AUTO: 1.01
TREPONEMA PALLIDUM IGG+IGM AB [PRESENCE] IN SERUM OR PLASMA BY IMMUNOASSAY: NON REACTIVE
UROBILINOGEN UR STRIP.AUTO-MCNC: 1 MG/DL
VIT B12 SERPL-MCNC: 354 PG/ML (ref 211–911)
WBC # BLD AUTO: 6.3 K/UL (ref 4.8–10.8)
WBC #/AREA URNS HPF: ABNORMAL /HPF

## 2017-09-03 LAB
BACTERIA UR CULT: NORMAL
SIGNIFICANT IND 70042: NORMAL
SOURCE SOURCE: NORMAL

## 2018-03-16 DIAGNOSIS — R68.84 JAW PAIN: ICD-10-CM

## 2018-03-16 DIAGNOSIS — I25.10 CORONARY ARTERIOSCLEROSIS: ICD-10-CM

## 2018-03-16 DIAGNOSIS — I10 BENIGN ESSENTIAL HYPERTENSION: ICD-10-CM

## 2018-03-16 RX ORDER — ATORVASTATIN CALCIUM 20 MG/1
TABLET, FILM COATED ORAL
Qty: 90 TAB | Refills: 3 | Status: SHIPPED | OUTPATIENT
Start: 2018-03-16

## 2018-03-20 ENCOUNTER — OFFICE VISIT (OUTPATIENT)
Dept: CARDIOLOGY | Facility: MEDICAL CENTER | Age: 83
End: 2018-03-20
Payer: MEDICARE

## 2018-03-20 VITALS
WEIGHT: 156 LBS | OXYGEN SATURATION: 94 % | DIASTOLIC BLOOD PRESSURE: 62 MMHG | BODY MASS INDEX: 29.45 KG/M2 | SYSTOLIC BLOOD PRESSURE: 112 MMHG | HEIGHT: 61 IN | HEART RATE: 68 BPM

## 2018-03-20 DIAGNOSIS — I25.10 CORONARY ARTERIOSCLEROSIS: ICD-10-CM

## 2018-03-20 DIAGNOSIS — E78.2 MIXED HYPERLIPIDEMIA: ICD-10-CM

## 2018-03-20 DIAGNOSIS — Z95.5 STENTED CORONARY ARTERY: ICD-10-CM

## 2018-03-20 DIAGNOSIS — I10 BENIGN ESSENTIAL HYPERTENSION: ICD-10-CM

## 2018-03-20 DIAGNOSIS — I34.0 NON-RHEUMATIC MITRAL REGURGITATION: ICD-10-CM

## 2018-03-20 PROCEDURE — 99214 OFFICE O/P EST MOD 30 MIN: CPT | Performed by: INTERNAL MEDICINE

## 2018-03-20 ASSESSMENT — ENCOUNTER SYMPTOMS
WHEEZING: 0
ORTHOPNEA: 0
SORE THROAT: 0
CONSTITUTIONAL NEGATIVE: 1
DIZZINESS: 0
RESPIRATORY NEGATIVE: 1
BRUISES/BLEEDS EASILY: 0
MUSCULOSKELETAL NEGATIVE: 1
CHILLS: 0
CLAUDICATION: 0
SHORTNESS OF BREATH: 0
NEUROLOGICAL NEGATIVE: 1
EYES NEGATIVE: 1
PALPITATIONS: 0
SPUTUM PRODUCTION: 0
COUGH: 0
CARDIOVASCULAR NEGATIVE: 1
GASTROINTESTINAL NEGATIVE: 1
WEAKNESS: 0
PND: 0
FEVER: 0
LOSS OF CONSCIOUSNESS: 0
HEMOPTYSIS: 0
STRIDOR: 0

## 2018-03-20 NOTE — PROGRESS NOTES
Chief Complaint   Patient presents with   • Coronary Artery Disease   HLD  HTN    Subjective:   Arianne Irving is a 86 y.o. female who presents today as a follow-up for her high blood pressure coronary disease and hyperlipidemia. Last saw her her blood pressure was poorly controlled. We started amlodipine and her blood pressure is at 120. We also started her on atorvastatin and she is tolerating this but no muscle pains or issues or joint pains. She was not having any chest pain palpitations or PND.    Past Medical History:   Diagnosis Date   • CAD (coronary artery disease) May 2002    PCI/stent (Penta 3.0 x 28mm) to the LAD.   • Edema     • HLD (hyperlipidemia)    • HTN (hypertension)    • Obesity       Past Surgical History:   Procedure Laterality Date   • HYSTERECTOMY, TOTAL ABDOMINAL     • KNEE ARTHROSCOPY     • ROTATOR CUFF REPAIR     • TONSILLECTOMY AND ADENOIDECTOMY       Family History   Problem Relation Age of Onset   • Heart Disease Brother      Social History     Social History   • Marital status:      Spouse name: N/A   • Number of children: N/A   • Years of education: N/A     Occupational History   • Not on file.     Social History Main Topics   • Smoking status: Former Smoker     Types: Cigarettes     Quit date: 1/1/1967   • Smokeless tobacco: Never Used   • Alcohol use Not on file   • Drug use: Unknown   • Sexual activity: Not on file     Other Topics Concern   • Not on file     Social History Narrative   • No narrative on file     Allergies   Allergen Reactions   • Nkda [No Known Drug Allergy]      Outpatient Encounter Prescriptions as of 3/20/2018   Medication Sig Dispense Refill   • atorvastatin (LIPITOR) 20 MG Tab TAKE ONE TABLET BY MOUTH IN THE EVENING 90 Tab 3   • amLODIPine (NORVASC) 2.5 MG Tab Take 1 Tab by mouth every day. Needs to be seen for further refills. Thank you 90 Tab 0   • losartan-hydrochlorothiazide (HYZAAR) 100-12.5 MG per tablet Take 1 Tab by mouth every day. 90 Tab 3   •  "metoprolol SR (TOPROL XL) 25 MG TABLET SR 24 HR Take 1 Tab by mouth every day. 90 Tab 3   • potassium chloride SA (KDUR) 20 MEQ Tab CR Take 1 Tab by mouth every day. 90 Tab 3   • Nisoldipine 17 MG TABLET SR 24 HR TAKE 1 TABLET BY MOUTH EVERY DAY 90 Tab 3   • meloxicam (MOBIC) 15 MG tablet      • trazodone (DESYREL) 50 MG Tab      • rivastigmine (EXELON) 9.5 MG/24HR patch      • NAMENDA XR 28 MG CAPSULE SR 24 HR      • estradiol (ESTRACE) 0.5 MG tablet Take 0.5 mg by mouth every day.     • nitroglycerin (NITROSTAT) 0.4 MG SUBL Place 1 Tab under tongue as needed for Chest Pain. 25 Tab 3   • aspirin (ASA) 81 MG CHEW Take 81 mg by mouth every day.     • esomeprazole (NEXIUM) 40 MG capsule Take 40 mg by mouth every morning before breakfast.     • paroxetine (PAXIL) 10 MG TABS Take 10 mg by mouth every day. Take with food        No facility-administered encounter medications on file as of 3/20/2018.      Review of Systems   Constitutional: Negative.  Negative for chills, fever and malaise/fatigue.   HENT: Negative.  Negative for sore throat.    Eyes: Negative.    Respiratory: Negative.  Negative for cough, hemoptysis, sputum production, shortness of breath, wheezing and stridor.    Cardiovascular: Negative.  Negative for chest pain, palpitations, orthopnea, claudication, leg swelling and PND.   Gastrointestinal: Negative.    Genitourinary: Negative.    Musculoskeletal: Negative.    Skin: Negative.    Neurological: Negative.  Negative for dizziness, loss of consciousness and weakness.   Endo/Heme/Allergies: Negative.  Does not bruise/bleed easily.   All other systems reviewed and are negative.       Objective:   /62   Pulse 68   Ht 1.549 m (5' 1\")   Wt 70.8 kg (156 lb)   SpO2 94%   BMI 29.48 kg/m²     Physical Exam   Constitutional: She appears well-developed and well-nourished. No distress.   HENT:   Head: Normocephalic and atraumatic.   Right Ear: External ear normal.   Left Ear: External ear normal.   Nose: " Nose normal.   Mouth/Throat: No oropharyngeal exudate.   Eyes: Conjunctivae and EOM are normal. Pupils are equal, round, and reactive to light. Right eye exhibits no discharge. Left eye exhibits no discharge. No scleral icterus.   Neck: Neck supple. No JVD present.   Cardiovascular: Normal rate, regular rhythm and intact distal pulses.  Exam reveals no gallop and no friction rub.    Pulmonary/Chest: Effort normal. No stridor. No respiratory distress. She has no wheezes. She has no rales. She exhibits no tenderness.   Abdominal: Soft. She exhibits no distension. There is no guarding.   Musculoskeletal: Normal range of motion. She exhibits no edema, tenderness or deformity.   Neurological: She is alert. She has normal reflexes. She displays normal reflexes. No cranial nerve deficit. She exhibits normal muscle tone. Coordination normal.   Skin: Skin is warm and dry. No rash noted. She is not diaphoretic. No erythema. No pallor.   Psychiatric: She has a normal mood and affect. Her behavior is normal. Judgment and thought content normal.   Nursing note and vitals reviewed.    Results for orders placed or performed during the hospital encounter of 07/23/12   ECHOCARDIOGRAM COMP W/O CONT   Result Value Ref Range    Eject.Frac. MOD BP 69.69     Eject.Frac. MOD 4C 69.98     Eject.Frac. MOD 2C 71.87       Lab Results   Component Value Date/Time    CHOLSTRLTOT 277 (H) 02/15/2017 09:53 AM     (H) 02/15/2017 09:53 AM    HDL 63 02/15/2017 09:53 AM    TRIGLYCERIDE 152 (H) 02/15/2017 09:53 AM       Lab Results   Component Value Date/Time    SODIUM 138 09/01/2017 01:24 PM    POTASSIUM 3.6 09/01/2017 01:24 PM    CHLORIDE 104 09/01/2017 01:24 PM    CO2 25 09/01/2017 01:24 PM    GLUCOSE 118 (H) 09/01/2017 01:24 PM    BUN 26 (H) 09/01/2017 01:24 PM    CREATININE 1.26 09/01/2017 01:24 PM    CREATININE 0.9 11/14/2005 12:15 PM     Lab Results   Component Value Date/Time    ALKPHOSPHAT 50 09/01/2017 01:24 PM    ASTSGOT 15 09/01/2017  01:24 PM    ALTSGPT 11 09/01/2017 01:24 PM    TBILIRUBIN 0.7 09/01/2017 01:24 PM      Echo: CONCLUSIONS  Mild concentric left ventricular hypertrophy. Left ventricular ejection   fraction is 65% to 70%.  Grade I diastolic dysfunction is present.  Mildly dilated left atrium.     Assessment:     1. Benign essential hypertension     2. Coronary arteriosclerosis     3. Mixed hyperlipidemia     4. Non-rheumatic mitral regurgitation     5. Stented coronary artery         Medical Decision Making:  Today's Assessment / Status / Plan:   86-year-old female with hypertension hyperlipidemia and coronary disease. We now have a risk factors under control. We will not make any changes to her medications today. I will see her back in 6 months.    Thank for you allowing me to take part in your patient's care, please call should you have any questions or would like to discuss this patient.

## 2018-03-20 NOTE — LETTER
[facility]   [address]   [address]  [place] [address]  Phone: [phone]  Fax: [phone]              Arianne Irving  11/3/1931    Encounter Date: 3/20/2018    Alonzo Flor M.D.          PROGRESS NOTE:  Chief Complaint   Patient presents with   • Coronary Artery Disease   HLD  HTN    Subjective:   Arianne Irving is a 86 y.o. female who presents today as a follow-up for her high blood pressure coronary disease and hyperlipidemia. Last saw her her blood pressure was poorly controlled. We started amlodipine and her blood pressure is at 120. We also started her on atorvastatin and she is tolerating this but no muscle pains or issues or joint pains. She was not having any chest pain palpitations or PND.    Past Medical History:   Diagnosis Date   • CAD (coronary artery disease) May 2002    PCI/stent (Penta 3.0 x 28mm) to the LAD.   • Edema     • HLD (hyperlipidemia)    • HTN (hypertension)    • Obesity       Past Surgical History:   Procedure Laterality Date   • HYSTERECTOMY, TOTAL ABDOMINAL     • KNEE ARTHROSCOPY     • ROTATOR CUFF REPAIR     • TONSILLECTOMY AND ADENOIDECTOMY       Family History   Problem Relation Age of Onset   • Heart Disease Brother      Social History     Social History   • Marital status:      Spouse name: N/A   • Number of children: N/A   • Years of education: N/A     Occupational History   • Not on file.     Social History Main Topics   • Smoking status: Former Smoker     Types: Cigarettes     Quit date: 1/1/1967   • Smokeless tobacco: Never Used   • Alcohol use Not on file   • Drug use: Unknown   • Sexual activity: Not on file     Other Topics Concern   • Not on file     Social History Narrative   • No narrative on file     Allergies   Allergen Reactions   • Nkda [No Known Drug Allergy]      Outpatient Encounter Prescriptions as of 3/20/2018   Medication Sig Dispense Refill   • atorvastatin (LIPITOR)  20 MG Tab TAKE ONE TABLET BY MOUTH IN THE EVENING 90 Tab 3   • amLODIPine (NORVASC) 2.5 MG Tab Take 1 Tab by mouth every day. Needs to be seen for further refills. Thank you 90 Tab 0   • losartan-hydrochlorothiazide (HYZAAR) 100-12.5 MG per tablet Take 1 Tab by mouth every day. 90 Tab 3   • metoprolol SR (TOPROL XL) 25 MG TABLET SR 24 HR Take 1 Tab by mouth every day. 90 Tab 3   • potassium chloride SA (KDUR) 20 MEQ Tab CR Take 1 Tab by mouth every day. 90 Tab 3   • Nisoldipine 17 MG TABLET SR 24 HR TAKE 1 TABLET BY MOUTH EVERY DAY 90 Tab 3   • meloxicam (MOBIC) 15 MG tablet      • trazodone (DESYREL) 50 MG Tab      • rivastigmine (EXELON) 9.5 MG/24HR patch      • NAMENDA XR 28 MG CAPSULE SR 24 HR      • estradiol (ESTRACE) 0.5 MG tablet Take 0.5 mg by mouth every day.     • nitroglycerin (NITROSTAT) 0.4 MG SUBL Place 1 Tab under tongue as needed for Chest Pain. 25 Tab 3   • aspirin (ASA) 81 MG CHEW Take 81 mg by mouth every day.     • esomeprazole (NEXIUM) 40 MG capsule Take 40 mg by mouth every morning before breakfast.     • paroxetine (PAXIL) 10 MG TABS Take 10 mg by mouth every day. Take with food        No facility-administered encounter medications on file as of 3/20/2018.      Review of Systems   Constitutional: Negative.  Negative for chills, fever and malaise/fatigue.   HENT: Negative.  Negative for sore throat.    Eyes: Negative.    Respiratory: Negative.  Negative for cough, hemoptysis, sputum production, shortness of breath, wheezing and stridor.    Cardiovascular: Negative.  Negative for chest pain, palpitations, orthopnea, claudication, leg swelling and PND.   Gastrointestinal: Negative.    Genitourinary: Negative.    Musculoskeletal: Negative.    Skin: Negative.    Neurological: Negative.  Negative for dizziness, loss of consciousness and weakness.   Endo/Heme/Allergies: Negative.  Does not bruise/bleed easily.   All other systems reviewed and are negative.       Objective:   /62   Pulse 68    "Ht 1.549 m (5' 1\")   Wt 70.8 kg (156 lb)   SpO2 94%   BMI 29.48 kg/m²      Physical Exam   Constitutional: She appears well-developed and well-nourished. No distress.   HENT:   Head: Normocephalic and atraumatic.   Right Ear: External ear normal.   Left Ear: External ear normal.   Nose: Nose normal.   Mouth/Throat: No oropharyngeal exudate.   Eyes: Conjunctivae and EOM are normal. Pupils are equal, round, and reactive to light. Right eye exhibits no discharge. Left eye exhibits no discharge. No scleral icterus.   Neck: Neck supple. No JVD present.   Cardiovascular: Normal rate, regular rhythm and intact distal pulses.  Exam reveals no gallop and no friction rub.    Pulmonary/Chest: Effort normal. No stridor. No respiratory distress. She has no wheezes. She has no rales. She exhibits no tenderness.   Abdominal: Soft. She exhibits no distension. There is no guarding.   Musculoskeletal: Normal range of motion. She exhibits no edema, tenderness or deformity.   Neurological: She is alert. She has normal reflexes. She displays normal reflexes. No cranial nerve deficit. She exhibits normal muscle tone. Coordination normal.   Skin: Skin is warm and dry. No rash noted. She is not diaphoretic. No erythema. No pallor.   Psychiatric: She has a normal mood and affect. Her behavior is normal. Judgment and thought content normal.   Nursing note and vitals reviewed.    Results for orders placed or performed during the hospital encounter of 07/23/12   ECHOCARDIOGRAM COMP W/O CONT   Result Value Ref Range    Eject.Frac. MOD BP 69.69     Eject.Frac. MOD 4C 69.98     Eject.Frac. MOD 2C 71.87       Lab Results   Component Value Date/Time    CHOLSTRLTOT 277 (H) 02/15/2017 09:53 AM     (H) 02/15/2017 09:53 AM    HDL 63 02/15/2017 09:53 AM    TRIGLYCERIDE 152 (H) 02/15/2017 09:53 AM       Lab Results   Component Value Date/Time    SODIUM 138 09/01/2017 01:24 PM    POTASSIUM 3.6 09/01/2017 01:24 PM    CHLORIDE 104 09/01/2017 01:24 " PM    CO2 25 09/01/2017 01:24 PM    GLUCOSE 118 (H) 09/01/2017 01:24 PM    BUN 26 (H) 09/01/2017 01:24 PM    CREATININE 1.26 09/01/2017 01:24 PM    CREATININE 0.9 11/14/2005 12:15 PM     Lab Results   Component Value Date/Time    ALKPHOSPHAT 50 09/01/2017 01:24 PM    ASTSGOT 15 09/01/2017 01:24 PM    ALTSGPT 11 09/01/2017 01:24 PM    TBILIRUBIN 0.7 09/01/2017 01:24 PM      Echo: CONCLUSIONS  Mild concentric left ventricular hypertrophy. Left ventricular ejection   fraction is 65% to 70%.  Grade I diastolic dysfunction is present.  Mildly dilated left atrium.     Assessment:     1. Benign essential hypertension     2. Coronary arteriosclerosis     3. Mixed hyperlipidemia     4. Non-rheumatic mitral regurgitation     5. Stented coronary artery         Medical Decision Making:  Today's Assessment / Status / Plan:   86-year-old female with hypertension hyperlipidemia and coronary disease. We now have a risk factors under control. We will not make any changes to her medications today. I will see her back in 6 months.    Thank for you allowing me to take part in your patient's care, please call should you have any questions or would like to discuss this patient.      Elli Dickerson M.D.  [address] [facility]  [address] [address]  VIA Facsimile: [phone]

## 2018-03-21 DIAGNOSIS — I10 ESSENTIAL HYPERTENSION: ICD-10-CM

## 2018-03-22 RX ORDER — NISOLDIPINE 17 MG/1
TABLET, FILM COATED, EXTENDED RELEASE ORAL
Qty: 90 TAB | Refills: 3 | Status: SHIPPED | OUTPATIENT
Start: 2018-03-22

## 2018-05-31 DIAGNOSIS — I10 ESSENTIAL HYPERTENSION: ICD-10-CM

## 2018-05-31 DIAGNOSIS — E87.6 HYPOKALEMIA: ICD-10-CM

## 2018-05-31 RX ORDER — POTASSIUM CHLORIDE 1500 MG/1
TABLET, EXTENDED RELEASE ORAL
Qty: 90 TAB | Refills: 3 | Status: SHIPPED | OUTPATIENT
Start: 2018-05-31

## 2018-05-31 RX ORDER — METOPROLOL SUCCINATE 25 MG/1
TABLET, EXTENDED RELEASE ORAL
Qty: 90 TAB | Refills: 3 | Status: ON HOLD | OUTPATIENT
Start: 2018-05-31 | End: 2018-09-25

## 2018-05-31 RX ORDER — LOSARTAN POTASSIUM AND HYDROCHLOROTHIAZIDE 12.5; 1 MG/1; MG/1
TABLET ORAL
Qty: 90 TAB | Refills: 3 | Status: SHIPPED | OUTPATIENT
Start: 2018-05-31

## 2018-07-02 ENCOUNTER — HOSPITAL ENCOUNTER (OUTPATIENT)
Dept: LAB | Facility: MEDICAL CENTER | Age: 83
End: 2018-07-02
Attending: INTERNAL MEDICINE
Payer: MEDICARE

## 2018-07-02 ENCOUNTER — HOSPITAL ENCOUNTER (OUTPATIENT)
Dept: RADIOLOGY | Facility: MEDICAL CENTER | Age: 83
End: 2018-07-02
Attending: INTERNAL MEDICINE
Payer: MEDICARE

## 2018-07-02 DIAGNOSIS — R10.10 PAIN OF UPPER ABDOMEN: ICD-10-CM

## 2018-07-02 LAB
ALBUMIN SERPL BCP-MCNC: 4.2 G/DL (ref 3.2–4.9)
ALBUMIN/GLOB SERPL: 1.6 G/DL
ALP SERPL-CCNC: 64 U/L (ref 30–99)
ALT SERPL-CCNC: 19 U/L (ref 2–50)
ANION GAP SERPL CALC-SCNC: 12 MMOL/L (ref 0–11.9)
AST SERPL-CCNC: 20 U/L (ref 12–45)
BASOPHILS # BLD AUTO: 1.1 % (ref 0–1.8)
BASOPHILS # BLD: 0.09 K/UL (ref 0–0.12)
BILIRUB SERPL-MCNC: 1 MG/DL (ref 0.1–1.5)
BUN SERPL-MCNC: 33 MG/DL (ref 8–22)
CALCIUM SERPL-MCNC: 9.8 MG/DL (ref 8.5–10.5)
CHLORIDE SERPL-SCNC: 97 MMOL/L (ref 96–112)
CO2 SERPL-SCNC: 27 MMOL/L (ref 20–33)
CREAT SERPL-MCNC: 1.57 MG/DL (ref 0.5–1.4)
EOSINOPHIL # BLD AUTO: 0.12 K/UL (ref 0–0.51)
EOSINOPHIL NFR BLD: 1.5 % (ref 0–6.9)
ERYTHROCYTE [DISTWIDTH] IN BLOOD BY AUTOMATED COUNT: 46.6 FL (ref 35.9–50)
GLOBULIN SER CALC-MCNC: 2.6 G/DL (ref 1.9–3.5)
GLUCOSE SERPL-MCNC: 106 MG/DL (ref 65–99)
HCT VFR BLD AUTO: 41 % (ref 37–47)
HGB BLD-MCNC: 13.3 G/DL (ref 12–16)
IMM GRANULOCYTES # BLD AUTO: 0.04 K/UL (ref 0–0.11)
IMM GRANULOCYTES NFR BLD AUTO: 0.5 % (ref 0–0.9)
LYMPHOCYTES # BLD AUTO: 1.59 K/UL (ref 1–4.8)
LYMPHOCYTES NFR BLD: 19.4 % (ref 22–41)
MCH RBC QN AUTO: 30.1 PG (ref 27–33)
MCHC RBC AUTO-ENTMCNC: 32.4 G/DL (ref 33.6–35)
MCV RBC AUTO: 92.8 FL (ref 81.4–97.8)
MONOCYTES # BLD AUTO: 0.6 K/UL (ref 0–0.85)
MONOCYTES NFR BLD AUTO: 7.3 % (ref 0–13.4)
NEUTROPHILS # BLD AUTO: 5.74 K/UL (ref 2–7.15)
NEUTROPHILS NFR BLD: 70.2 % (ref 44–72)
NRBC # BLD AUTO: 0 K/UL
NRBC BLD-RTO: 0 /100 WBC
PLATELET # BLD AUTO: 153 K/UL (ref 164–446)
PMV BLD AUTO: 11 FL (ref 9–12.9)
POTASSIUM SERPL-SCNC: 3.8 MMOL/L (ref 3.6–5.5)
PROT SERPL-MCNC: 6.8 G/DL (ref 6–8.2)
RBC # BLD AUTO: 4.42 M/UL (ref 4.2–5.4)
SODIUM SERPL-SCNC: 136 MMOL/L (ref 135–145)
WBC # BLD AUTO: 8.2 K/UL (ref 4.8–10.8)

## 2018-07-02 PROCEDURE — 76700 US EXAM ABDOM COMPLETE: CPT

## 2018-07-02 PROCEDURE — 80053 COMPREHEN METABOLIC PANEL: CPT

## 2018-07-02 PROCEDURE — 85025 COMPLETE CBC W/AUTO DIFF WBC: CPT

## 2018-07-02 PROCEDURE — 36415 COLL VENOUS BLD VENIPUNCTURE: CPT

## 2018-07-18 ENCOUNTER — OFFICE VISIT (OUTPATIENT)
Dept: CARDIOLOGY | Facility: MEDICAL CENTER | Age: 83
End: 2018-07-18
Payer: MEDICARE

## 2018-07-18 VITALS
BODY MASS INDEX: 28.89 KG/M2 | HEART RATE: 80 BPM | OXYGEN SATURATION: 94 % | WEIGHT: 157 LBS | HEIGHT: 62 IN | DIASTOLIC BLOOD PRESSURE: 86 MMHG | SYSTOLIC BLOOD PRESSURE: 138 MMHG

## 2018-07-18 DIAGNOSIS — I34.0 NON-RHEUMATIC MITRAL REGURGITATION: ICD-10-CM

## 2018-07-18 DIAGNOSIS — R60.0 LOCALIZED EDEMA: ICD-10-CM

## 2018-07-18 DIAGNOSIS — I25.10 CORONARY ARTERIOSCLEROSIS: ICD-10-CM

## 2018-07-18 DIAGNOSIS — Z95.5 STENTED CORONARY ARTERY: ICD-10-CM

## 2018-07-18 DIAGNOSIS — E78.2 MIXED HYPERLIPIDEMIA: ICD-10-CM

## 2018-07-18 DIAGNOSIS — I10 BENIGN ESSENTIAL HYPERTENSION: ICD-10-CM

## 2018-07-18 DIAGNOSIS — R06.02 SOB (SHORTNESS OF BREATH): ICD-10-CM

## 2018-07-18 PROCEDURE — 99214 OFFICE O/P EST MOD 30 MIN: CPT | Performed by: INTERNAL MEDICINE

## 2018-07-18 ASSESSMENT — ENCOUNTER SYMPTOMS
WEAKNESS: 0
NEUROLOGICAL NEGATIVE: 1
CARDIOVASCULAR NEGATIVE: 1
SHORTNESS OF BREATH: 0
SORE THROAT: 0
CLAUDICATION: 0
STRIDOR: 0
COUGH: 0
BRUISES/BLEEDS EASILY: 0
PND: 0
WHEEZING: 0
DIZZINESS: 0
GASTROINTESTINAL NEGATIVE: 1
HEMOPTYSIS: 0
LOSS OF CONSCIOUSNESS: 0
FEVER: 0
SPUTUM PRODUCTION: 0
PALPITATIONS: 0
CONSTITUTIONAL NEGATIVE: 1
EYES NEGATIVE: 1
CHILLS: 0
MUSCULOSKELETAL NEGATIVE: 1
RESPIRATORY NEGATIVE: 1
ORTHOPNEA: 0

## 2018-07-18 NOTE — PROGRESS NOTES
Chief Complaint   Patient presents with   • Shortness of Breath     sob upon any kind of exertion       Subjective:   Arianne Irving is a 86 y.o. female who presents today as a follow-up for hypertension chronic kidney disease coronary disease and hyperlipidemia.  Her daughters with her today is complaining that she is having worsening shortness of breath for approximately 1 month.  She has noticed that when she climbs steps or walks she will have to stop after approximately 100-200 feet.  She is not complaining of chest pain or palpitations.  She does have a history of CAD with stents in her heart approximately 15 years ago.  Her blood pressures are ranging between 101 30 at home.  She is compliant with her atorvastatin.    Past Medical History:   Diagnosis Date   • CAD (coronary artery disease) May 2002    PCI/stent (Penta 3.0 x 28mm) to the LAD.   • Edema     • HLD (hyperlipidemia)    • HTN (hypertension)    • Obesity       Past Surgical History:   Procedure Laterality Date   • HYSTERECTOMY, TOTAL ABDOMINAL     • KNEE ARTHROSCOPY     • ROTATOR CUFF REPAIR     • TONSILLECTOMY AND ADENOIDECTOMY       Family History   Problem Relation Age of Onset   • Heart Disease Brother      Social History     Social History   • Marital status:      Spouse name: N/A   • Number of children: N/A   • Years of education: N/A     Occupational History   • Not on file.     Social History Main Topics   • Smoking status: Former Smoker     Types: Cigarettes     Quit date: 1/1/1967   • Smokeless tobacco: Never Used   • Alcohol use No   • Drug use: No   • Sexual activity: Not on file     Other Topics Concern   • Not on file     Social History Narrative   • No narrative on file     Allergies   Allergen Reactions   • Nkda [No Known Drug Allergy]      Outpatient Encounter Prescriptions as of 7/18/2018   Medication Sig Dispense Refill   • sertraline (ZOLOFT) 50 MG Tab Take 50 mg by mouth every day.     • amLODIPine (NORVASC) 2.5 MG Tab  TAKE 1 TABLET BY MOUTH ONCE DAILY *NEED  TO  BE  SEEN  FOR  REFILLS* 90 Tab 2   • metoprolol SR (TOPROL XL) 25 MG TABLET SR 24 HR TAKE ONE TABLET BY MOUTH ONCE DAILY 90 Tab 3   • losartan-hydrochlorothiazide (HYZAAR) 100-12.5 MG per tablet TAKE ONE TABLET BY MOUTH ONCE DAILY 90 Tab 3   • potassium Chloride ER (K-TAB) 20 MEQ Tab CR tablet TAKE ONE TABLET BY MOUTH ONCE DAILY 90 Tab 3   • Nisoldipine 17 MG TABLET SR 24 HR TAKE 1 TABLET BY MOUTH EVERY DAY 90 Tab 3   • atorvastatin (LIPITOR) 20 MG Tab TAKE ONE TABLET BY MOUTH IN THE EVENING 90 Tab 3   • trazodone (DESYREL) 50 MG Tab      • rivastigmine (EXELON) 9.5 MG/24HR patch      • NAMENDA XR 28 MG CAPSULE SR 24 HR      • estradiol (ESTRACE) 0.5 MG tablet Take 0.5 mg by mouth every day.     • aspirin (ASA) 81 MG CHEW Take 81 mg by mouth every day.     • esomeprazole (NEXIUM) 40 MG capsule Take 40 mg by mouth every morning before breakfast.     • meloxicam (MOBIC) 15 MG tablet      • nitroglycerin (NITROSTAT) 0.4 MG SUBL Place 1 Tab under tongue as needed for Chest Pain. 25 Tab 3   • [DISCONTINUED] paroxetine (PAXIL) 10 MG TABS Take 10 mg by mouth every day. Take with food        No facility-administered encounter medications on file as of 7/18/2018.      Review of Systems   Constitutional: Negative.  Negative for chills, fever and malaise/fatigue.   HENT: Negative.  Negative for sore throat.    Eyes: Negative.    Respiratory: Negative.  Negative for cough, hemoptysis, sputum production, shortness of breath, wheezing and stridor.    Cardiovascular: Negative.  Negative for chest pain, palpitations, orthopnea, claudication, leg swelling and PND.   Gastrointestinal: Negative.    Genitourinary: Negative.    Musculoskeletal: Negative.    Skin: Negative.    Neurological: Negative.  Negative for dizziness, loss of consciousness and weakness.   Endo/Heme/Allergies: Negative.  Does not bruise/bleed easily.   All other systems reviewed and are negative.       Objective:   BP  "138/86   Pulse 80   Ht 1.575 m (5' 2\")   Wt 71.2 kg (157 lb)   SpO2 94%   BMI 28.72 kg/m²     Physical Exam   Constitutional: She appears well-developed and well-nourished. No distress.   HENT:   Head: Normocephalic and atraumatic.   Right Ear: External ear normal.   Left Ear: External ear normal.   Nose: Nose normal.   Mouth/Throat: No oropharyngeal exudate.   Eyes: Conjunctivae and EOM are normal. Pupils are equal, round, and reactive to light. Right eye exhibits no discharge. Left eye exhibits no discharge. No scleral icterus.   Neck: Neck supple. No JVD present.   Cardiovascular: Normal rate, regular rhythm and intact distal pulses.  Exam reveals no gallop and no friction rub.    No murmur heard.  Pulmonary/Chest: Effort normal. No stridor. No respiratory distress. She has no wheezes. She has no rales. She exhibits no tenderness.   Abdominal: Soft. She exhibits no distension. There is no guarding.   Musculoskeletal: Normal range of motion. She exhibits no edema, tenderness or deformity.   Neurological: She is alert. She has normal reflexes. She displays normal reflexes. No cranial nerve deficit. She exhibits normal muscle tone. Coordination normal.   Skin: Skin is warm and dry. No rash noted. She is not diaphoretic. No erythema. No pallor.   Psychiatric: She has a normal mood and affect. Her behavior is normal. Judgment and thought content normal.   Nursing note and vitals reviewed.      Assessment:     1. Benign essential hypertension     2. Coronary arteriosclerosis  ECHOCARDIOGRAM COMP W/O CONT    NM-CARDIAC STRESS TEST   3. Stented coronary artery  NM-CARDIAC STRESS TEST   4. Mixed hyperlipidemia  NM-CARDIAC STRESS TEST   5. Non-rheumatic mitral regurgitation  ECHOCARDIOGRAM COMP W/O CONT   6. Localized edema     7. SOB (shortness of breath)  ECHOCARDIOGRAM COMP W/O CONT    NM-CARDIAC STRESS TEST       Medical Decision Making:  Today's Assessment / Status / Plan:     86-year-old female with shortness of " breath which is a new problem for her.  I am concerned that she is having exertional angina as her shortness of breath is the anginal equivalent.  We will send her for a nuclear stress test as well as an echocardiogram.  We will see her back in 3 months but call her with results.  Otherwise no changes were medical therapy today.    Thank for you allowing me to take part in your patient's care, please call should you have any questions or would like to discuss this patient.

## 2018-07-18 NOTE — LETTER
[facility]   [address]  [place] [address]  Phone: [phone]  Fax: [phone]              Arianne Irving  11/3/1931    Encounter Date: 7/18/2018    Alonzo Flor M.D.          PROGRESS NOTE:  Chief Complaint   Patient presents with   • Shortness of Breath     sob upon any kind of exertion       Subjective:   Arianne Irving is a 86 y.o. female who presents today as a follow-up for hypertension chronic kidney disease coronary disease and hyperlipidemia.  Her daughters with her today is complaining that she is having worsening shortness of breath for approximately 1 month.  She has noticed that when she climbs steps or walks she will have to stop after approximately 100-200 feet.  She is not complaining of chest pain or palpitations.  She does have a history of CAD with stents in her heart approximately 15 years ago.  Her blood pressures are ranging between 101 30 at home.  She is compliant with her atorvastatin.    Past Medical History:   Diagnosis Date   • CAD (coronary artery disease) May 2002    PCI/stent (Penta 3.0 x 28mm) to the LAD.   • Edema     • HLD (hyperlipidemia)    • HTN (hypertension)    • Obesity       Past Surgical History:   Procedure Laterality Date   • HYSTERECTOMY, TOTAL ABDOMINAL     • KNEE ARTHROSCOPY     • ROTATOR CUFF REPAIR     • TONSILLECTOMY AND ADENOIDECTOMY       Family History   Problem Relation Age of Onset   • Heart Disease Brother      Social History     Social History   • Marital status:      Spouse name: N/A   • Number of children: N/A   • Years of education: N/A     Occupational History   • Not on file.     Social History Main Topics   • Smoking status: Former Smoker     Types: Cigarettes     Quit date: 1/1/1967   • Smokeless tobacco: Never Used   • Alcohol use No   • Drug use: No   • Sexual activity: Not on file     Other Topics Concern   • Not on file     Social History Narrative   • No narrative on  file     Allergies   Allergen Reactions   • Nkda [No Known Drug Allergy]      Outpatient Encounter Prescriptions as of 7/18/2018   Medication Sig Dispense Refill   • sertraline (ZOLOFT) 50 MG Tab Take 50 mg by mouth every day.     • amLODIPine (NORVASC) 2.5 MG Tab TAKE 1 TABLET BY MOUTH ONCE DAILY *NEED  TO  BE  SEEN  FOR  REFILLS* 90 Tab 2   • metoprolol SR (TOPROL XL) 25 MG TABLET SR 24 HR TAKE ONE TABLET BY MOUTH ONCE DAILY 90 Tab 3   • losartan-hydrochlorothiazide (HYZAAR) 100-12.5 MG per tablet TAKE ONE TABLET BY MOUTH ONCE DAILY 90 Tab 3   • potassium Chloride ER (K-TAB) 20 MEQ Tab CR tablet TAKE ONE TABLET BY MOUTH ONCE DAILY 90 Tab 3   • Nisoldipine 17 MG TABLET SR 24 HR TAKE 1 TABLET BY MOUTH EVERY DAY 90 Tab 3   • atorvastatin (LIPITOR) 20 MG Tab TAKE ONE TABLET BY MOUTH IN THE EVENING 90 Tab 3   • trazodone (DESYREL) 50 MG Tab      • rivastigmine (EXELON) 9.5 MG/24HR patch      • NAMENDA XR 28 MG CAPSULE SR 24 HR      • estradiol (ESTRACE) 0.5 MG tablet Take 0.5 mg by mouth every day.     • aspirin (ASA) 81 MG CHEW Take 81 mg by mouth every day.     • esomeprazole (NEXIUM) 40 MG capsule Take 40 mg by mouth every morning before breakfast.     • meloxicam (MOBIC) 15 MG tablet      • nitroglycerin (NITROSTAT) 0.4 MG SUBL Place 1 Tab under tongue as needed for Chest Pain. 25 Tab 3   • [DISCONTINUED] paroxetine (PAXIL) 10 MG TABS Take 10 mg by mouth every day. Take with food        No facility-administered encounter medications on file as of 7/18/2018.      Review of Systems   Constitutional: Negative.  Negative for chills, fever and malaise/fatigue.   HENT: Negative.  Negative for sore throat.    Eyes: Negative.    Respiratory: Negative.  Negative for cough, hemoptysis, sputum production, shortness of breath, wheezing and stridor.    Cardiovascular: Negative.  Negative for chest pain, palpitations, orthopnea, claudication, leg swelling and PND.   Gastrointestinal: Negative.    Genitourinary: Negative.     "  Musculoskeletal: Negative.    Skin: Negative.    Neurological: Negative.  Negative for dizziness, loss of consciousness and weakness.   Endo/Heme/Allergies: Negative.  Does not bruise/bleed easily.   All other systems reviewed and are negative.       Objective:   /86   Pulse 80   Ht 1.575 m (5' 2\")   Wt 71.2 kg (157 lb)   SpO2 94%   BMI 28.72 kg/m²      Physical Exam   Constitutional: She appears well-developed and well-nourished. No distress.   HENT:   Head: Normocephalic and atraumatic.   Right Ear: External ear normal.   Left Ear: External ear normal.   Nose: Nose normal.   Mouth/Throat: No oropharyngeal exudate.   Eyes: Conjunctivae and EOM are normal. Pupils are equal, round, and reactive to light. Right eye exhibits no discharge. Left eye exhibits no discharge. No scleral icterus.   Neck: Neck supple. No JVD present.   Cardiovascular: Normal rate, regular rhythm and intact distal pulses.  Exam reveals no gallop and no friction rub.    No murmur heard.  Pulmonary/Chest: Effort normal. No stridor. No respiratory distress. She has no wheezes. She has no rales. She exhibits no tenderness.   Abdominal: Soft. She exhibits no distension. There is no guarding.   Musculoskeletal: Normal range of motion. She exhibits no edema, tenderness or deformity.   Neurological: She is alert. She has normal reflexes. She displays normal reflexes. No cranial nerve deficit. She exhibits normal muscle tone. Coordination normal.   Skin: Skin is warm and dry. No rash noted. She is not diaphoretic. No erythema. No pallor.   Psychiatric: She has a normal mood and affect. Her behavior is normal. Judgment and thought content normal.   Nursing note and vitals reviewed.      Assessment:     1. Benign essential hypertension     2. Coronary arteriosclerosis  ECHOCARDIOGRAM COMP W/O CONT    NM-CARDIAC STRESS TEST   3. Stented coronary artery  NM-CARDIAC STRESS TEST   4. Mixed hyperlipidemia  NM-CARDIAC STRESS TEST   5. Non-rheumatic " mitral regurgitation  ECHOCARDIOGRAM COMP W/O CONT   6. Localized edema     7. SOB (shortness of breath)  ECHOCARDIOGRAM COMP W/O CONT    NM-CARDIAC STRESS TEST       Medical Decision Making:  Today's Assessment / Status / Plan:     86-year-old female with shortness of breath which is a new problem for her.  I am concerned that she is having exertional angina as her shortness of breath is the anginal equivalent.  We will send her for a nuclear stress test as well as an echocardiogram.  We will see her back in 3 months but call her with results.  Otherwise no changes were medical therapy today.    Thank for you allowing me to take part in your patient's care, please call should you have any questions or would like to discuss this patient.      Elli Dickerson M.D.  [address]  [address] [address]  VIA Facsimile: [phone]

## 2018-07-24 ENCOUNTER — HOSPITAL ENCOUNTER (OUTPATIENT)
Dept: RADIOLOGY | Facility: MEDICAL CENTER | Age: 83
End: 2018-07-24
Attending: INTERNAL MEDICINE
Payer: MEDICARE

## 2018-07-24 DIAGNOSIS — E78.2 MIXED HYPERLIPIDEMIA: ICD-10-CM

## 2018-07-24 DIAGNOSIS — R06.02 SOB (SHORTNESS OF BREATH): ICD-10-CM

## 2018-07-24 DIAGNOSIS — I25.10 CORONARY ARTERIOSCLEROSIS: ICD-10-CM

## 2018-07-24 DIAGNOSIS — Z95.5 STENTED CORONARY ARTERY: ICD-10-CM

## 2018-07-24 PROCEDURE — 700111 HCHG RX REV CODE 636 W/ 250 OVERRIDE (IP)

## 2018-07-24 PROCEDURE — A9502 TC99M TETROFOSMIN: HCPCS

## 2018-07-24 RX ORDER — REGADENOSON 0.08 MG/ML
INJECTION, SOLUTION INTRAVENOUS
Status: COMPLETED
Start: 2018-07-24 | End: 2018-07-24

## 2018-07-24 RX ADMIN — REGADENOSON 0.4 MG: 0.08 INJECTION, SOLUTION INTRAVENOUS at 11:26

## 2018-07-29 ENCOUNTER — HOSPITAL ENCOUNTER (EMERGENCY)
Facility: MEDICAL CENTER | Age: 83
End: 2018-07-29
Attending: EMERGENCY MEDICINE
Payer: MEDICARE

## 2018-07-29 ENCOUNTER — APPOINTMENT (OUTPATIENT)
Dept: RADIOLOGY | Facility: MEDICAL CENTER | Age: 83
End: 2018-07-29
Attending: EMERGENCY MEDICINE
Payer: MEDICARE

## 2018-07-29 VITALS
BODY MASS INDEX: 29.49 KG/M2 | DIASTOLIC BLOOD PRESSURE: 80 MMHG | RESPIRATION RATE: 18 BRPM | TEMPERATURE: 96.7 F | HEART RATE: 74 BPM | SYSTOLIC BLOOD PRESSURE: 147 MMHG | WEIGHT: 160.27 LBS | OXYGEN SATURATION: 93 % | HEIGHT: 62 IN

## 2018-07-29 DIAGNOSIS — R06.02 SHORTNESS OF BREATH: ICD-10-CM

## 2018-07-29 DIAGNOSIS — I50.9 CONGESTIVE HEART FAILURE, UNSPECIFIED HF CHRONICITY, UNSPECIFIED HEART FAILURE TYPE (HCC): ICD-10-CM

## 2018-07-29 LAB
ALBUMIN SERPL BCP-MCNC: 4.2 G/DL (ref 3.2–4.9)
ALBUMIN/GLOB SERPL: 1.8 G/DL
ALP SERPL-CCNC: 78 U/L (ref 30–99)
ALT SERPL-CCNC: 18 U/L (ref 2–50)
ANION GAP SERPL CALC-SCNC: 9 MMOL/L (ref 0–11.9)
APTT PPP: 32.4 SEC (ref 24.7–36)
AST SERPL-CCNC: 18 U/L (ref 12–45)
BASOPHILS # BLD AUTO: 1.3 % (ref 0–1.8)
BASOPHILS # BLD: 0.08 K/UL (ref 0–0.12)
BILIRUB SERPL-MCNC: 0.8 MG/DL (ref 0.1–1.5)
BNP SERPL-MCNC: 590 PG/ML (ref 0–100)
BUN SERPL-MCNC: 35 MG/DL (ref 8–22)
CALCIUM SERPL-MCNC: 9.5 MG/DL (ref 8.5–10.5)
CHLORIDE SERPL-SCNC: 99 MMOL/L (ref 96–112)
CO2 SERPL-SCNC: 25 MMOL/L (ref 20–33)
CREAT SERPL-MCNC: 1.25 MG/DL (ref 0.5–1.4)
EKG IMPRESSION: NORMAL
EOSINOPHIL # BLD AUTO: 0.08 K/UL (ref 0–0.51)
EOSINOPHIL NFR BLD: 1.3 % (ref 0–6.9)
ERYTHROCYTE [DISTWIDTH] IN BLOOD BY AUTOMATED COUNT: 45 FL (ref 35.9–50)
GLOBULIN SER CALC-MCNC: 2.4 G/DL (ref 1.9–3.5)
GLUCOSE SERPL-MCNC: 114 MG/DL (ref 65–99)
HCT VFR BLD AUTO: 37.8 % (ref 37–47)
HGB BLD-MCNC: 12.7 G/DL (ref 12–16)
IMM GRANULOCYTES # BLD AUTO: 0.03 K/UL (ref 0–0.11)
IMM GRANULOCYTES NFR BLD AUTO: 0.5 % (ref 0–0.9)
INR PPP: 1 (ref 0.87–1.13)
LIPASE SERPL-CCNC: 33 U/L (ref 11–82)
LYMPHOCYTES # BLD AUTO: 1.42 K/UL (ref 1–4.8)
LYMPHOCYTES NFR BLD: 22.6 % (ref 22–41)
MCH RBC QN AUTO: 30.2 PG (ref 27–33)
MCHC RBC AUTO-ENTMCNC: 33.6 G/DL (ref 33.6–35)
MCV RBC AUTO: 89.8 FL (ref 81.4–97.8)
MONOCYTES # BLD AUTO: 0.46 K/UL (ref 0–0.85)
MONOCYTES NFR BLD AUTO: 7.3 % (ref 0–13.4)
NEUTROPHILS # BLD AUTO: 4.2 K/UL (ref 2–7.15)
NEUTROPHILS NFR BLD: 67 % (ref 44–72)
NRBC # BLD AUTO: 0 K/UL
NRBC BLD-RTO: 0 /100 WBC
PLATELET # BLD AUTO: 160 K/UL (ref 164–446)
PMV BLD AUTO: 9.8 FL (ref 9–12.9)
POTASSIUM SERPL-SCNC: 3.8 MMOL/L (ref 3.6–5.5)
PROT SERPL-MCNC: 6.6 G/DL (ref 6–8.2)
PROTHROMBIN TIME: 12.9 SEC (ref 12–14.6)
RBC # BLD AUTO: 4.21 M/UL (ref 4.2–5.4)
SODIUM SERPL-SCNC: 133 MMOL/L (ref 135–145)
TROPONIN I SERPL-MCNC: 0.02 NG/ML (ref 0–0.04)
WBC # BLD AUTO: 6.3 K/UL (ref 4.8–10.8)

## 2018-07-29 PROCEDURE — 83880 ASSAY OF NATRIURETIC PEPTIDE: CPT

## 2018-07-29 PROCEDURE — 85610 PROTHROMBIN TIME: CPT

## 2018-07-29 PROCEDURE — 99284 EMERGENCY DEPT VISIT MOD MDM: CPT

## 2018-07-29 PROCEDURE — 93005 ELECTROCARDIOGRAM TRACING: CPT | Performed by: EMERGENCY MEDICINE

## 2018-07-29 PROCEDURE — 85730 THROMBOPLASTIN TIME PARTIAL: CPT

## 2018-07-29 PROCEDURE — 85025 COMPLETE CBC W/AUTO DIFF WBC: CPT

## 2018-07-29 PROCEDURE — 71045 X-RAY EXAM CHEST 1 VIEW: CPT

## 2018-07-29 PROCEDURE — 84484 ASSAY OF TROPONIN QUANT: CPT

## 2018-07-29 PROCEDURE — 83690 ASSAY OF LIPASE: CPT

## 2018-07-29 PROCEDURE — 80053 COMPREHEN METABOLIC PANEL: CPT

## 2018-07-29 ASSESSMENT — PAIN SCALES - GENERAL
PAINLEVEL_OUTOF10: 0
PAINLEVEL_OUTOF10: 2

## 2018-07-29 NOTE — ED NOTES
Instructions reviewed in detail with pt and family. Pt states she is aware that the provider wished for her to stay and be admitted but pt states she does not want to stay. Pt states ERP talked with her about situation and she would still like to leave. Pt signed AMA form. Pt states understanding about ERPs encouragement for follow-up and states understanding. Pt did not wish for WC. Pt left ambulatory with a steady gait. Family at side x2 and will drive home.

## 2018-07-29 NOTE — ED PROVIDER NOTES
"ED Provider Note    CHIEF COMPLAINT  Chief Complaint   Patient presents with   • Shortness of Breath     x \"a couple of weeks\" but worse today   • Abdominal Pain     x 2 months       HPI  Arianne Irving is a 86 y.o. female who presents with shortness of breath, for the last month, worse this morning at 6 AM, difficulty laying down because of shortness of breath, PND orthopnea and dyspnea on exertion. Evidently she had a recent cardiac workup, stress test on 5 days ago because of the shortness of breath, echocardiogram as scheduled this week.  Some sort of pain this morning, possibly chest pain possibly abdominal pain however she has dementia and now she cannot explain where the pain was. She denies any pain currently. No nausea no vomiting no diaphoresis no fever no chills        REVIEW OF SYSTEMS  See HPI for further details. Coronary artery disease, coronary artery stent, hypertension obesity depression dementia renal cyst Denies other G.I., G.U.. endrocine, cardiovascular, respriatory or neurological problems. All other systems are negative.     PAST MEDICAL HISTORY  Past Medical History:   Diagnosis Date   • CAD (coronary artery disease) May 2002    PCI/stent (Penta 3.0 x 28mm) to the LAD.   • Edema     • HLD (hyperlipidemia)    • HTN (hypertension)    • Obesity     • Psychiatric disorder     dementia   • Renal disorder     simple cist       FAMILY HISTORY  Family History   Problem Relation Age of Onset   • Heart Disease Brother        SOCIAL HISTORY  Social History     Social History   • Marital status:      Spouse name: N/A   • Number of children: N/A   • Years of education: N/A     Social History Main Topics   • Smoking status: Former Smoker     Types: Cigarettes     Quit date: 1/1/1967   • Smokeless tobacco: Never Used   • Alcohol use No   • Drug use: No   • Sexual activity: Not on file     Other Topics Concern   • Not on file     Social History Narrative   • No narrative on file       SURGICAL " "HISTORY  Past Surgical History:   Procedure Laterality Date   • GYN SURGERY      hysterectomy   • HYSTERECTOMY, TOTAL ABDOMINAL     • KNEE ARTHROSCOPY     • ROTATOR CUFF REPAIR     • TONSILLECTOMY AND ADENOIDECTOMY         CURRENT MEDICATIONS  Home Medications    **Home medications have not yet been reviewed for this encounter**         ALLERGIES  Allergies   Allergen Reactions   • Nkda [No Known Drug Allergy]        PHYSICAL EXAM  VITAL SIGNS: /80   Pulse 67   Temp 35.9 °C (96.7 °F) (Temporal)   Resp 14   Ht 1.575 m (5' 2\")   Wt 72.7 kg (160 lb 4.4 oz)   SpO2 93%   BMI 29.31 kg/m²    Constitutional: Well developed, Well nourished, No acute distress, Non-toxic appearance.   HENT: Normocephalic, Atraumatic, Bilateral external ears normal, Oropharynx moist, No oral exudates, Nose normal.   Eyes: PERRL, EOMI, Conjunctiva normal, No discharge.   Neck: Normal range of motion, No tenderness, Supple, No stridor.   Lymphatic: No lymphadenopathy noted.   Cardiovascular: Normal heart rate, Normal rhythm, No murmurs, No rubs, No gallops.   Thorax & Lungs: Normal breath sounds, No respiratory distress, No wheezing, No chest tenderness.   Abdomen:  No tenderness, no guarding no rigidity and the abdomen is soft.  No masses, No pulsatile masses.  Skin: Warm, Dry, No erythema, No rash.   Back: No tenderness, No CVA tenderness.   Extremities: Intact distal pulses, No edema, No tenderness, No cyanosis, No clubbing.   Musculoskeletal: Good range of motion in all major joints. No tenderness to palpation or major deformities noted.   Neurologic: Alert & oriented x 3, Normal motor function, Normal sensory function, No focal deficits noted.   Psychiatric: Affect normal, Judgment normal, Mood normal.   EKG Interpretation    Interpreted by me    Rhythm: Irregular irregularity, rule out atrial fibrillation     Rate: 77     Axis: -37     Ectopy: none  Conduction: normal  ST Segments: no acute change  T Waves: no acute change  Q " Waves: none    Clinical Impression: I do not have an old EKG to compare to      RADIOLOGY/PROCEDURES  DX-CHEST-PORTABLE (1 VIEW)   Final Result      Small bilateral pleural effusions.      Bibasilar opacities may represent atelectasis or consolidation.      Mild pulmonary interstitial edema.      Cardiomegaly.      Atherosclerotic plaque.               COURSE & MEDICAL DECISION MAKING  Pertinent Labs & Imaging studies reviewed. (See chart for details) white count and normal hematocrit and normal platelet count 160. There is no shift. Electrolytes unremarkable, BUN elevated at 35 creatinine 1.25 glucose 114 troponin normal, BNP, 590, clotting studies normal            She has had shortness of breath for last month, worse this morning. Although her oxygenation has been fine. Emergency department she is oxygenating well, lungs are clear, she exhibits no evidence of any shortness of breath and denies any pain any chest pain or abdominal pain. Cannot recall whether she had any chest pain    EKG is read on the automatic reading, normal sinus rhythm however appears irregular, possible atrial fibrillation. No history of atrial fibrillation in the past. She is complaining of increasing shortness of breath, BNP is elevated, chest x-ray shows congestion, concern for congestive heart failure. Concern for atrial fibrillation. I will talk with the hospitalist about admission.    . I have made arrangements to have her admitted to the hospital however she is now refusing and her family backs her up, does not look her admitted. She is to return if any further problems. She is scheduled to have an echocardiogram on Tuesday, 2 days from now.  FINAL IMPRESSION  1.   1. Shortness of breath    2. Congestive heart failure, unspecified HF chronicity, unspecified heart failure type (HCC)    3. Rule out atrial fibrillation        2.   3.     Disposition  Discharge instructions are understood. This patient is to return if fever vomiting or no  better in 12 hours. Follow up with the dr hook. Information sheets on shortness of breath congestive heart failure and atrial fibrillation  This patient has chosen to sign against medical advise. He understands that there may be a severe medical problem that may cause disability or death. The patient understands that they may return to the emergency room any time if they change their mind. I have strongly recommended that this patient stay in the emergency room for completion of the workup and to be admitted, but the patient refuses to stay and refuses to be admitted. I have given them alternative followup with the [facility] clinic where they will not require an appointment. Prior to leaving I have once again strongly recommended that the patient stay for admission. The patient does understand that the consequences of these actions may result in disability or death. I have offered to talk with family in addition.  Electronically signed by: Kg Greer, 7/29/2018 11:43 AM

## 2018-07-29 NOTE — DISCHARGE INSTRUCTIONS
Heart Failure  Heart failure is a condition in which the heart has trouble pumping blood because it has become weak or stiff. This means that the heart does not pump blood efficiently for the body to work well. For some people with heart failure, fluid may back up into the lungs and there may be swelling (edema) in the lower legs. Heart failure is usually a long-term (chronic) condition. It is important for you to take good care of yourself and follow the treatment plan from your health care provider.  What are the causes?  This condition is caused by some health problems, including:  · High blood pressure (hypertension). Hypertension causes the heart muscle to work harder than normal. High blood pressure eventually causes the heart to become stiff and weak.  · Coronary artery disease (CAD). CAD is the buildup of cholesterol and fat (plaques) in the arteries of the heart.  · Heart attack (myocardial infarction). Injured tissue, which is caused by the heart attack, does not contract as well and the heart's ability to pump blood is weakened.  · Abnormal heart valves. When the heart valves do not open and close properly, the heart muscle must pump harder to keep the blood flowing.  · Heart muscle disease (cardiomyopathy or myocarditis). Heart muscle disease is damage to the heart muscle from a variety of causes, such as drug or alcohol abuse, infections, or unknown causes. These can increase the risk of heart failure.  · Lung disease. When the lungs do not work properly, the heart must work harder.  What increases the risk?  Risk of heart failure increases as a person ages. This condition is also more likely to develop in people who:  · Are overweight.  · Are male.  · Smoke or chew tobacco.  · Abuse alcohol or illegal drugs.  · Have taken medicines that can damage the heart, such as chemotherapy drugs.  · Have diabetes.  ¨ High blood sugar (glucose) is associated with high fat (lipid) levels in the blood.  ¨ Diabetes  can also damage tiny blood vessels that carry nutrients to the heart muscle.  · Have abnormal heart rhythms.  · Have thyroid problems.  · Have low blood counts (anemia).  What are the signs or symptoms?  Symptoms of this condition include:  · Shortness of breath with activity, such as when climbing stairs.  · Persistent cough.  · Swelling of the feet, ankles, legs, or abdomen.  · Unexplained weight gain.  · Difficulty breathing when lying flat (orthopnea).  · Waking from sleep because of the need to sit up and get more air.  · Rapid heartbeat.  · Fatigue and loss of energy.  · Feeling light-headed, dizzy, or close to fainting.  · Loss of appetite.  · Nausea.  · Increased urination during the night (nocturia).  · Confusion.  How is this diagnosed?  This condition is diagnosed based on:  · Medical history, symptoms, and a physical exam.  · Diagnostic tests, which may include:  ¨ Echocardiogram.  ¨ Electrocardiogram (ECG).  ¨ Chest X-ray.  ¨ Blood tests.  ¨ Exercise stress test.  ¨ Radionuclide scans.  ¨ Cardiac catheterization and angiogram.  How is this treated?  Treatment for this condition is aimed at managing the symptoms of heart failure. Medicines, behavioral changes, or other treatments may be necessary to treat heart failure.  Medicines   These may include:  · Angiotensin-converting enzyme (ACE) inhibitors. This type of medicine blocks the effects of a blood protein called angiotensin-converting enzyme. ACE inhibitors relax (dilate) the blood vessels and help to lower blood pressure.  · Angiotensin receptor blockers (ARBs). This type of medicine blocks the actions of a blood protein called angiotensin. ARBs dilate the blood vessels and help to lower blood pressure.  · Water pills (diuretics). Diuretics cause the kidneys to remove salt and water from the blood. The extra fluid is removed through urination, leaving a lower volume of blood that the heart has to pump.  · Beta blockers. These improve heart muscle  strength and they prevent the heart from beating too quickly.  · Digoxin. This increases the force of the heartbeat.  Healthy behavior changes   These may include:  · Reaching and maintaining a healthy weight.  · Stopping smoking or chewing tobacco.  · Eating heart-healthy foods.  · Limiting or avoiding alcohol.  · Stopping use of street drugs (illegal drugs).  · Physical activity.  Other treatments   These may include:  · Surgery to open blocked coronary arteries or repair damaged heart valves.  · Placement of a biventricular pacemaker to improve heart muscle function (cardiac resynchronization therapy). This device paces both the right ventricle and left ventricle.  · Placement of a device to treat serious abnormal heart rhythms (implantable cardioverter defibrillator, or ICD).  · Placement of a device to improve the pumping ability of the heart (left ventricular assist device, or LVAD).  · Heart transplant. This can cure heart failure, and it is considered for certain patients who do not improve with other therapies.  Follow these instructions at home:  Medicines  · Take over-the-counter and prescription medicines only as told by your health care provider. Medicines are important in reducing the workload of your heart, slowing the progression of heart failure, and improving your symptoms.  ¨ Do not stop taking your medicine unless your health care provider told you to do that.  ¨ Do not skip any dose of medicine.  ¨ Refill your prescriptions before you run out of medicine. You need your medicines every day.  Eating and drinking  · Eat heart-healthy foods. Talk with a dietitian to make an eating plan that is right for you.  ¨ Choose foods that contain no trans fat and are low in saturated fat and cholesterol. Healthy choices include fresh or frozen fruits and vegetables, fish, lean meats, legumes, fat-free or low-fat dairy products, and whole-grain or high-fiber foods.  ¨ Limit salt (sodium) if directed by your  health care provider. Sodium restriction may reduce symptoms of heart failure. Ask a dietitian to recommend heart-healthy seasonings.  ¨ Use healthy cooking methods instead of frying. Healthy methods include roasting, grilling, broiling, baking, poaching, steaming, and stir-frying.  · Limit your fluid intake if directed by your health care provider. Fluid restriction may reduce symptoms of heart failure.  Lifestyle  · Stop smoking or using chewing tobacco. Nicotine and tobacco can damage your heart and your blood vessels. Do not use nicotine gum or patches before talking to your health care provider.  · Limit alcohol intake to no more than 1 drink per day for non-pregnant women and 2 drinks per day for men. One drink equals 12 oz of beer, 5 oz of wine, or 1½ oz of hard liquor.  ¨ Drinking more than that is harmful to your heart. Tell your health care provider if you drink alcohol several times a week.  ¨ Talk with your health care provider about whether any level of alcohol use is safe for you.  ¨ If your heart has already been damaged by alcohol or you have severe heart failure, drinking alcohol should be stopped completely.  · Stop use of illegal drugs.  · Lose weight if directed by your health care provider. Weight loss may reduce symptoms of heart failure.  · Do moderate physical activity if directed by your health care provider. People who are elderly and people with severe heart failure should consult with a health care provider for physical activity recommendations.  Monitor important information  · Weigh yourself every day. Keeping track of your weight daily helps you to notice excess fluid sooner.  ¨ Weigh yourself every morning after you urinate and before you eat breakfast.  ¨ Wear the same amount of clothing each time you weigh yourself.  ¨ Record your daily weight. Provide your health care provider with your weight record.  · Monitor and record your blood pressure as told by your health care  provider.  · Check your pulse as told by your health care provider.  Dealing with extreme temperatures  · If the weather is extremely hot:  ¨ Avoid vigorous physical activity.  ¨ Use air conditioning or fans or seek a cooler location.  ¨ Avoid caffeine and alcohol.  ¨ Wear loose-fitting, lightweight, and light-colored clothing.  · If the weather is extremely cold:  ¨ Avoid vigorous physical activity.  ¨ Layer your clothes.  ¨ Wear mittens or gloves, a hat, and a scarf when you go outside.  ¨ Avoid alcohol.  General instructions  · Manage other health conditions such as hypertension, diabetes, thyroid disease, or abnormal heart rhythms as told by your health care provider.  · Learn to manage stress. If you need help to do this, ask your health care provider.  · Plan rest periods when fatigued.  · Get ongoing education and support as needed.  · Participate in or seek rehabilitation as needed to maintain or improve independence and quality of life.  · Stay up to date with immunizations. Keeping current on pneumococcal and influenza immunizations is especially important to prevent respiratory infections.  · Keep all follow-up visits as told by your health care provider. This is important.  Contact a health care provider if:  · You have a rapid weight gain.  · You have increasing shortness of breath that is unusual for you.  · You are unable to participate in your usual physical activities.  · You tire easily.  · You cough more than normal, especially with physical activity.  · You have any swelling or more swelling in areas such as your hands, feet, ankles, or abdomen.  · You are unable to sleep because it is hard to breathe.  · You feel like your heart is beating quickly (palpitations).  · You become dizzy or light-headed when you stand up.  Get help right away if:  · You have difficulty breathing.  · You notice or your family notices a change in your awareness, such as having trouble staying awake or having difficulty  with concentration.  · You have pain or discomfort in your chest.  · You have an episode of fainting (syncope).  This information is not intended to replace advice given to you by your health care provider. Make sure you discuss any questions you have with your health care provider.  Document Released: 12/18/2006 Document Revised: 08/22/2017 Document Reviewed: 07/12/2017  [employer] Patient Education © 2017 [employer]      Shortness of Breath  Shortness of breath means you have trouble breathing. Shortness of breath needs medical care right away.  HOME CARE   · Do not smoke.  · Avoid being around chemicals or things (paint fumes, dust) that may bother your breathing.  · Rest as needed. Slowly begin your normal activities.  · Only take medicines as told by your doctor.  · Keep all doctor visits as told.  GET HELP RIGHT AWAY IF:   · Your shortness of breath gets worse.  · You feel lightheaded, pass out (faint), or have a cough that is not helped by medicine.  · You cough up blood.  · You have pain with breathing.  · You have pain in your chest, arms, shoulders, or belly (abdomen).  · You have a fever.  · You cannot walk up stairs or exercise the way you normally do.  · You do not get better in the time expected.  · You have a hard time doing normal activities even with rest.  · You have problems with your medicines.  · You have any new symptoms.  MAKE SURE YOU:  · Understand these instructions.  · Will watch your condition.  · Will get help right away if you are not doing well or get worse.  This information is not intended to replace advice given to you by your health care provider. Make sure you discuss any questions you have with your health care provider.  Document Released: 06/05/2009 Document Revised: 12/23/2014 Document Reviewed: 03/04/2013  [employer] Patient Education © 2017 [employer]  Return if fever, vomiting or if no better in 12 hours.Return imediately for admission. Return at  any time if you change your mind about admission. There is risk of death or severe disability.

## 2018-07-29 NOTE — ED TRIAGE NOTES
85 y/o female bib family for evaluation of sob x several weeks but increasing this morning, family states she usually has some degree of sob but it resolves quickly, today the symptoms lasted longer. Family also states the patient was c/o abd pain this morning which lasted longer than usual also. Pt has had similar pains x 2 months. Pt has no complaints now.

## 2018-07-30 ENCOUNTER — TELEPHONE (OUTPATIENT)
Dept: CARDIOLOGY | Facility: MEDICAL CENTER | Age: 83
End: 2018-07-30

## 2018-07-30 ENCOUNTER — PATIENT OUTREACH (OUTPATIENT)
Dept: HEALTH INFORMATION MANAGEMENT | Facility: OTHER | Age: 83
End: 2018-07-30

## 2018-07-30 ENCOUNTER — APPOINTMENT (OUTPATIENT)
Dept: RADIOLOGY | Facility: MEDICAL CENTER | Age: 83
End: 2018-07-30
Attending: EMERGENCY MEDICINE
Payer: MEDICARE

## 2018-07-30 ENCOUNTER — HOSPITAL ENCOUNTER (OUTPATIENT)
Facility: MEDICAL CENTER | Age: 83
End: 2018-08-01
Attending: EMERGENCY MEDICINE | Admitting: HOSPITALIST
Payer: MEDICARE

## 2018-07-30 DIAGNOSIS — I48.91 ATRIAL FIBRILLATION, UNSPECIFIED TYPE (HCC): ICD-10-CM

## 2018-07-30 DIAGNOSIS — I50.9 CONGESTIVE HEART FAILURE, UNSPECIFIED HF CHRONICITY, UNSPECIFIED HEART FAILURE TYPE (HCC): ICD-10-CM

## 2018-07-30 DIAGNOSIS — I48.19 PERSISTENT ATRIAL FIBRILLATION (HCC): ICD-10-CM

## 2018-07-30 DIAGNOSIS — R07.89 FEELING OF CHEST TIGHTNESS: ICD-10-CM

## 2018-07-30 PROBLEM — G30.1 LATE ONSET ALZHEIMER'S DISEASE WITHOUT BEHAVIORAL DISTURBANCE (HCC): Status: ACTIVE | Noted: 2018-07-30

## 2018-07-30 PROBLEM — F02.80 LATE ONSET ALZHEIMER'S DISEASE WITHOUT BEHAVIORAL DISTURBANCE (HCC): Status: ACTIVE | Noted: 2018-07-30

## 2018-07-30 LAB
ALBUMIN SERPL BCP-MCNC: 4.3 G/DL (ref 3.2–4.9)
ALBUMIN/GLOB SERPL: 2 G/DL
ALP SERPL-CCNC: 75 U/L (ref 30–99)
ALT SERPL-CCNC: 15 U/L (ref 2–50)
ANION GAP SERPL CALC-SCNC: 12 MMOL/L (ref 0–11.9)
AST SERPL-CCNC: 16 U/L (ref 12–45)
BASOPHILS # BLD AUTO: 1.1 % (ref 0–1.8)
BASOPHILS # BLD: 0.07 K/UL (ref 0–0.12)
BILIRUB SERPL-MCNC: 0.8 MG/DL (ref 0.1–1.5)
BNP SERPL-MCNC: 387 PG/ML (ref 0–100)
BUN SERPL-MCNC: 32 MG/DL (ref 8–22)
CALCIUM SERPL-MCNC: 9.6 MG/DL (ref 8.5–10.5)
CHLORIDE SERPL-SCNC: 97 MMOL/L (ref 96–112)
CO2 SERPL-SCNC: 22 MMOL/L (ref 20–33)
CREAT SERPL-MCNC: 1.22 MG/DL (ref 0.5–1.4)
EKG IMPRESSION: NORMAL
EOSINOPHIL # BLD AUTO: 0.12 K/UL (ref 0–0.51)
EOSINOPHIL NFR BLD: 1.9 % (ref 0–6.9)
ERYTHROCYTE [DISTWIDTH] IN BLOOD BY AUTOMATED COUNT: 44.5 FL (ref 35.9–50)
GLOBULIN SER CALC-MCNC: 2.2 G/DL (ref 1.9–3.5)
GLUCOSE SERPL-MCNC: 149 MG/DL (ref 65–99)
HCT VFR BLD AUTO: 38.9 % (ref 37–47)
HGB BLD-MCNC: 13 G/DL (ref 12–16)
IMM GRANULOCYTES # BLD AUTO: 0.03 K/UL (ref 0–0.11)
IMM GRANULOCYTES NFR BLD AUTO: 0.5 % (ref 0–0.9)
LIPASE SERPL-CCNC: 42 U/L (ref 11–82)
LV EJECT FRACT  99904: 65
LV EJECT FRACT MOD 2C 99903: 49.49
LV EJECT FRACT MOD 4C 99902: 63.9
LV EJECT FRACT MOD BP 99901: 56.68
LYMPHOCYTES # BLD AUTO: 1.59 K/UL (ref 1–4.8)
LYMPHOCYTES NFR BLD: 24.8 % (ref 22–41)
MAGNESIUM SERPL-MCNC: 1.7 MG/DL (ref 1.5–2.5)
MCH RBC QN AUTO: 30 PG (ref 27–33)
MCHC RBC AUTO-ENTMCNC: 33.4 G/DL (ref 33.6–35)
MCV RBC AUTO: 89.8 FL (ref 81.4–97.8)
MONOCYTES # BLD AUTO: 0.44 K/UL (ref 0–0.85)
MONOCYTES NFR BLD AUTO: 6.9 % (ref 0–13.4)
NEUTROPHILS # BLD AUTO: 4.17 K/UL (ref 2–7.15)
NEUTROPHILS NFR BLD: 64.8 % (ref 44–72)
NRBC # BLD AUTO: 0 K/UL
NRBC BLD-RTO: 0 /100 WBC
PLATELET # BLD AUTO: 169 K/UL (ref 164–446)
PMV BLD AUTO: 10.3 FL (ref 9–12.9)
POTASSIUM SERPL-SCNC: 3.1 MMOL/L (ref 3.6–5.5)
PROT SERPL-MCNC: 6.5 G/DL (ref 6–8.2)
RBC # BLD AUTO: 4.33 M/UL (ref 4.2–5.4)
SODIUM SERPL-SCNC: 131 MMOL/L (ref 135–145)
TROPONIN I SERPL-MCNC: 0.02 NG/ML (ref 0–0.04)
TROPONIN I SERPL-MCNC: 0.02 NG/ML (ref 0–0.04)
TSH SERPL DL<=0.005 MIU/L-ACNC: 8.93 UIU/ML (ref 0.38–5.33)
WBC # BLD AUTO: 6.4 K/UL (ref 4.8–10.8)

## 2018-07-30 PROCEDURE — 99220 PR INITIAL OBSERVATION CARE,LEVL III: CPT | Performed by: HOSPITALIST

## 2018-07-30 PROCEDURE — G0378 HOSPITAL OBSERVATION PER HR: HCPCS

## 2018-07-30 PROCEDURE — 93005 ELECTROCARDIOGRAM TRACING: CPT | Performed by: EMERGENCY MEDICINE

## 2018-07-30 PROCEDURE — 93306 TTE W/DOPPLER COMPLETE: CPT | Mod: 26 | Performed by: INTERNAL MEDICINE

## 2018-07-30 PROCEDURE — 99285 EMERGENCY DEPT VISIT HI MDM: CPT

## 2018-07-30 PROCEDURE — 84443 ASSAY THYROID STIM HORMONE: CPT

## 2018-07-30 PROCEDURE — A9270 NON-COVERED ITEM OR SERVICE: HCPCS | Performed by: HOSPITALIST

## 2018-07-30 PROCEDURE — 93306 TTE W/DOPPLER COMPLETE: CPT

## 2018-07-30 PROCEDURE — 94760 N-INVAS EAR/PLS OXIMETRY 1: CPT

## 2018-07-30 PROCEDURE — 96372 THER/PROPH/DIAG INJ SC/IM: CPT

## 2018-07-30 PROCEDURE — 700102 HCHG RX REV CODE 250 W/ 637 OVERRIDE(OP): Performed by: HOSPITALIST

## 2018-07-30 PROCEDURE — 80053 COMPREHEN METABOLIC PANEL: CPT

## 2018-07-30 PROCEDURE — 700105 HCHG RX REV CODE 258

## 2018-07-30 PROCEDURE — 93005 ELECTROCARDIOGRAM TRACING: CPT

## 2018-07-30 PROCEDURE — 71045 X-RAY EXAM CHEST 1 VIEW: CPT

## 2018-07-30 PROCEDURE — 96375 TX/PRO/DX INJ NEW DRUG ADDON: CPT | Mod: XU

## 2018-07-30 PROCEDURE — 83690 ASSAY OF LIPASE: CPT

## 2018-07-30 PROCEDURE — 700111 HCHG RX REV CODE 636 W/ 250 OVERRIDE (IP): Performed by: HOSPITALIST

## 2018-07-30 PROCEDURE — 96365 THER/PROPH/DIAG IV INF INIT: CPT | Mod: XU

## 2018-07-30 PROCEDURE — 83036 HEMOGLOBIN GLYCOSYLATED A1C: CPT

## 2018-07-30 PROCEDURE — 96376 TX/PRO/DX INJ SAME DRUG ADON: CPT

## 2018-07-30 PROCEDURE — 84484 ASSAY OF TROPONIN QUANT: CPT | Mod: 91

## 2018-07-30 PROCEDURE — 83880 ASSAY OF NATRIURETIC PEPTIDE: CPT

## 2018-07-30 PROCEDURE — 83735 ASSAY OF MAGNESIUM: CPT

## 2018-07-30 PROCEDURE — 85025 COMPLETE CBC W/AUTO DIFF WBC: CPT

## 2018-07-30 PROCEDURE — 36415 COLL VENOUS BLD VENIPUNCTURE: CPT

## 2018-07-30 RX ORDER — ASPIRIN 81 MG/1
81 TABLET, CHEWABLE ORAL DAILY
Status: DISCONTINUED | OUTPATIENT
Start: 2018-07-30 | End: 2018-08-01 | Stop reason: HOSPADM

## 2018-07-30 RX ORDER — WARFARIN SODIUM 2.5 MG/1
2.5 TABLET ORAL
Status: DISCONTINUED | OUTPATIENT
Start: 2018-07-31 | End: 2018-07-31

## 2018-07-30 RX ORDER — AMLODIPINE BESYLATE 5 MG/1
5 TABLET ORAL
Status: DISCONTINUED | OUTPATIENT
Start: 2018-07-30 | End: 2018-08-01 | Stop reason: HOSPADM

## 2018-07-30 RX ORDER — RIVASTIGMINE TARTRATE 1.5 MG/1
4.5 CAPSULE ORAL 2 TIMES DAILY
Status: DISCONTINUED | OUTPATIENT
Start: 2018-07-30 | End: 2018-08-01 | Stop reason: HOSPADM

## 2018-07-30 RX ORDER — SODIUM CHLORIDE 9 MG/ML
INJECTION, SOLUTION INTRAVENOUS
Status: COMPLETED
Start: 2018-07-30 | End: 2018-07-31

## 2018-07-30 RX ORDER — WARFARIN SODIUM 5 MG/1
5 TABLET ORAL
Status: COMPLETED | OUTPATIENT
Start: 2018-07-30 | End: 2018-07-30

## 2018-07-30 RX ORDER — AMLODIPINE BESYLATE 2.5 MG/1
2.5 TABLET ORAL DAILY
COMMUNITY
End: 2018-09-24

## 2018-07-30 RX ORDER — ATORVASTATIN CALCIUM 20 MG/1
20 TABLET, FILM COATED ORAL
Status: DISCONTINUED | OUTPATIENT
Start: 2018-07-30 | End: 2018-08-01 | Stop reason: HOSPADM

## 2018-07-30 RX ORDER — POTASSIUM CHLORIDE 7.45 MG/ML
10 INJECTION INTRAVENOUS
Status: COMPLETED | OUTPATIENT
Start: 2018-07-30 | End: 2018-07-30

## 2018-07-30 RX ORDER — ACETAMINOPHEN 325 MG/1
650 TABLET ORAL EVERY 6 HOURS PRN
Status: DISCONTINUED | OUTPATIENT
Start: 2018-07-30 | End: 2018-08-01 | Stop reason: HOSPADM

## 2018-07-30 RX ORDER — ONDANSETRON 2 MG/ML
4 INJECTION INTRAMUSCULAR; INTRAVENOUS EVERY 4 HOURS PRN
Status: DISCONTINUED | OUTPATIENT
Start: 2018-07-30 | End: 2018-08-01 | Stop reason: HOSPADM

## 2018-07-30 RX ORDER — LOSARTAN POTASSIUM AND HYDROCHLOROTHIAZIDE 12.5; 1 MG/1; MG/1
1 TABLET ORAL DAILY
Status: DISCONTINUED | OUTPATIENT
Start: 2018-07-30 | End: 2018-07-30

## 2018-07-30 RX ORDER — ESTRADIOL 1 MG/1
0.5 TABLET ORAL DAILY
Status: DISCONTINUED | OUTPATIENT
Start: 2018-07-30 | End: 2018-07-30

## 2018-07-30 RX ORDER — METOPROLOL SUCCINATE 25 MG/1
25 TABLET, EXTENDED RELEASE ORAL
Status: DISCONTINUED | OUTPATIENT
Start: 2018-07-30 | End: 2018-08-01 | Stop reason: HOSPADM

## 2018-07-30 RX ORDER — TRAZODONE HYDROCHLORIDE 50 MG/1
50 TABLET ORAL
Status: DISCONTINUED | OUTPATIENT
Start: 2018-07-30 | End: 2018-08-01 | Stop reason: HOSPADM

## 2018-07-30 RX ORDER — POLYETHYLENE GLYCOL 3350 17 G/17G
1 POWDER, FOR SOLUTION ORAL
Status: DISCONTINUED | OUTPATIENT
Start: 2018-07-30 | End: 2018-08-01 | Stop reason: HOSPADM

## 2018-07-30 RX ORDER — OMEPRAZOLE 20 MG/1
20 CAPSULE, DELAYED RELEASE ORAL DAILY
Status: DISCONTINUED | OUTPATIENT
Start: 2018-07-30 | End: 2018-08-01 | Stop reason: HOSPADM

## 2018-07-30 RX ORDER — RIVASTIGMINE 9.5 MG/24H
PATCH, EXTENDED RELEASE TRANSDERMAL DAILY
Status: DISCONTINUED | OUTPATIENT
Start: 2018-07-30 | End: 2018-07-30

## 2018-07-30 RX ORDER — FUROSEMIDE 10 MG/ML
20 INJECTION INTRAMUSCULAR; INTRAVENOUS
Status: DISCONTINUED | OUTPATIENT
Start: 2018-07-30 | End: 2018-07-31

## 2018-07-30 RX ORDER — ONDANSETRON 4 MG/1
4 TABLET, ORALLY DISINTEGRATING ORAL EVERY 4 HOURS PRN
Status: DISCONTINUED | OUTPATIENT
Start: 2018-07-30 | End: 2018-08-01 | Stop reason: HOSPADM

## 2018-07-30 RX ORDER — AMLODIPINE BESYLATE 5 MG/1
2.5 TABLET ORAL
Status: DISCONTINUED | OUTPATIENT
Start: 2018-07-30 | End: 2018-07-30

## 2018-07-30 RX ORDER — ESOMEPRAZOLE MAGNESIUM 40 MG/1
40 CAPSULE, DELAYED RELEASE ORAL
Status: DISCONTINUED | OUTPATIENT
Start: 2018-07-30 | End: 2018-07-30

## 2018-07-30 RX ORDER — AMOXICILLIN 250 MG
2 CAPSULE ORAL 2 TIMES DAILY
Status: DISCONTINUED | OUTPATIENT
Start: 2018-07-30 | End: 2018-08-01 | Stop reason: HOSPADM

## 2018-07-30 RX ORDER — MEMANTINE HYDROCHLORIDE 10 MG/1
10 TABLET ORAL 2 TIMES DAILY
Status: DISCONTINUED | OUTPATIENT
Start: 2018-07-30 | End: 2018-08-01 | Stop reason: HOSPADM

## 2018-07-30 RX ORDER — MEMANTINE HYDROCHLORIDE 28 MG/1
28 CAPSULE, EXTENDED RELEASE ORAL DAILY
Status: DISCONTINUED | OUTPATIENT
Start: 2018-07-30 | End: 2018-07-30

## 2018-07-30 RX ORDER — POTASSIUM CHLORIDE 20 MEQ/1
20 TABLET, EXTENDED RELEASE ORAL DAILY
Status: DISCONTINUED | OUTPATIENT
Start: 2018-07-30 | End: 2018-08-01 | Stop reason: HOSPADM

## 2018-07-30 RX ORDER — BISACODYL 10 MG
10 SUPPOSITORY, RECTAL RECTAL
Status: DISCONTINUED | OUTPATIENT
Start: 2018-07-30 | End: 2018-08-01 | Stop reason: HOSPADM

## 2018-07-30 RX ORDER — LOSARTAN POTASSIUM 50 MG/1
100 TABLET ORAL
Status: DISCONTINUED | OUTPATIENT
Start: 2018-07-30 | End: 2018-08-01 | Stop reason: HOSPADM

## 2018-07-30 RX ORDER — HYDROCHLOROTHIAZIDE 25 MG/1
12.5 TABLET ORAL
Status: DISCONTINUED | OUTPATIENT
Start: 2018-07-30 | End: 2018-08-01 | Stop reason: HOSPADM

## 2018-07-30 RX ORDER — NISOLDIPINE 17 MG/1
1 TABLET, FILM COATED, EXTENDED RELEASE ORAL
Status: DISCONTINUED | OUTPATIENT
Start: 2018-07-30 | End: 2018-07-30

## 2018-07-30 RX ADMIN — TRAZODONE HYDROCHLORIDE 50 MG: 50 TABLET ORAL at 20:16

## 2018-07-30 RX ADMIN — RIVASTIGMINE TARTRATE 4.5 MG: 1.5 CAPSULE ORAL at 11:53

## 2018-07-30 RX ADMIN — SERTRALINE 50 MG: 50 TABLET, FILM COATED ORAL at 17:19

## 2018-07-30 RX ADMIN — POTASSIUM CHLORIDE 10 MEQ: 7.46 INJECTION, SOLUTION INTRAVENOUS at 06:27

## 2018-07-30 RX ADMIN — FUROSEMIDE 20 MG: 10 INJECTION, SOLUTION INTRAMUSCULAR; INTRAVENOUS at 17:19

## 2018-07-30 RX ADMIN — STANDARDIZED SENNA CONCENTRATE AND DOCUSATE SODIUM 2 TABLET: 8.6; 5 TABLET, FILM COATED ORAL at 09:29

## 2018-07-30 RX ADMIN — ESTRADIOL 0.5 MG: 1 TABLET ORAL at 09:33

## 2018-07-30 RX ADMIN — FUROSEMIDE 20 MG: 10 INJECTION, SOLUTION INTRAMUSCULAR; INTRAVENOUS at 06:27

## 2018-07-30 RX ADMIN — AMLODIPINE BESYLATE 5 MG: 5 TABLET ORAL at 09:32

## 2018-07-30 RX ADMIN — HYDROCHLOROTHIAZIDE 12.5 MG: 25 TABLET ORAL at 09:31

## 2018-07-30 RX ADMIN — OMEPRAZOLE 20 MG: 20 CAPSULE, DELAYED RELEASE ORAL at 09:33

## 2018-07-30 RX ADMIN — METOPROLOL SUCCINATE 25 MG: 25 TABLET, EXTENDED RELEASE ORAL at 17:19

## 2018-07-30 RX ADMIN — ATORVASTATIN CALCIUM 20 MG: 20 TABLET, FILM COATED ORAL at 17:19

## 2018-07-30 RX ADMIN — WARFARIN SODIUM 5 MG: 5 TABLET ORAL at 20:16

## 2018-07-30 RX ADMIN — SODIUM CHLORIDE: 9 INJECTION, SOLUTION INTRAVENOUS at 09:30

## 2018-07-30 RX ADMIN — MEMANTINE HYDROCHLORIDE 10 MG: 10 TABLET ORAL at 17:19

## 2018-07-30 RX ADMIN — RIVASTIGMINE TARTRATE 4.5 MG: 1.5 CAPSULE ORAL at 17:20

## 2018-07-30 RX ADMIN — ASPIRIN 81 MG: 81 TABLET, CHEWABLE ORAL at 09:33

## 2018-07-30 RX ADMIN — POTASSIUM CHLORIDE 10 MEQ: 7.46 INJECTION, SOLUTION INTRAVENOUS at 10:53

## 2018-07-30 RX ADMIN — LOSARTAN POTASSIUM 100 MG: 50 TABLET ORAL at 09:31

## 2018-07-30 RX ADMIN — ENOXAPARIN SODIUM 40 MG: 100 INJECTION SUBCUTANEOUS at 09:30

## 2018-07-30 RX ADMIN — MEMANTINE HYDROCHLORIDE 10 MG: 10 TABLET ORAL at 09:32

## 2018-07-30 RX ADMIN — POTASSIUM CHLORIDE 20 MEQ: 1500 TABLET, EXTENDED RELEASE ORAL at 09:31

## 2018-07-30 ASSESSMENT — CHA2DS2 SCORE
AGE 65 TO 74: NO
VASCULAR DISEASE: YES
PRIOR STROKE OR TIA OR THROMBOEMBOLISM: YES
DIABETES: NO
HYPERTENSION: YES
AGE 75 OR GREATER: YES
CHF OR LEFT VENTRICULAR DYSFUNCTION: YES
SEX: FEMALE
CHA2DS2 VASC SCORE: 8

## 2018-07-30 ASSESSMENT — LIFESTYLE VARIABLES
DO YOU DRINK ALCOHOL: NO
EVER_SMOKED: YES

## 2018-07-30 ASSESSMENT — PAIN SCALES - GENERAL
PAINLEVEL_OUTOF10: 0
PAINLEVEL_OUTOF10: 0
PAINLEVEL_OUTOF10: 3
PAINLEVEL_OUTOF10: 0
PAINLEVEL_OUTOF10: 0

## 2018-07-30 ASSESSMENT — COPD QUESTIONNAIRES
DURING THE PAST 4 WEEKS HOW MUCH DID YOU FEEL SHORT OF BREATH: SOME OF THE TIME
DO YOU EVER COUGH UP ANY MUCUS OR PHLEGM?: YES, EVERY DAY
HAVE YOU SMOKED AT LEAST 100 CIGARETTES IN YOUR ENTIRE LIFE: YES
COPD SCREENING SCORE: 7

## 2018-07-30 NOTE — PROGRESS NOTES
Admitted today for new onset A. Fib  Echo unremarkable  Discussed anticoagulation with patient and family member at bedside  Considered as a relative but patient's renal function will not tolerate this  Started on Coumadin  Diuresing with Lasix  Argueta inserted

## 2018-07-30 NOTE — ED TRIAGE NOTES
"Arianne Nowakcarolyn  86 y.o. female  Chief Complaint   Patient presents with   • Shortness of Breath     Pt. states SOB with accompanying chest tightness. No s/s of difficulty breathing. Pt. is 92% on RA. Pt. talking in full and complete sentences. Pt. was seen here yesterday, and reported needing to be admitted for CHF exacerbation.    • Chest Pain     Pt. states left sided chest tightness that is exacerbated with inspiration and expiration. Pt. states it radiates across her chest at times.     /90   Pulse 81   Temp 36.2 °C (97.1 °F)   Resp 16   Ht 1.575 m (5' 2\")   Wt 72.7 kg (160 lb 4.4 oz)   SpO2 92%   BMI 29.31 kg/m²     Pt amb to triage via wheelchair. Pt has extensive cardiac hx. Charge RN notified of this pt.  Pt is alert and oriented, speaking in full sentences, follows commands and responds appropriately to questions. NAD. Resp are even and unlabored.  Pt. To Red 5 after EKG.  "

## 2018-07-30 NOTE — ASSESSMENT & PLAN NOTE
Unclear if systolic or diastolic.  Echocardiogram remains pending.  Start diuresis given symptoms of shortness of breath and mild edema.  Monitor weights, 2 g sodium diet.  Already taking beta blocker therapy which will be continued

## 2018-07-30 NOTE — ED PROVIDER NOTES
ED Provider Note    ED Provider Note    Primary care provider: Elli Dickerson M.D.  Means of arrival: walk in  History obtained from: Patient    CHIEF COMPLAINT  Chief Complaint   Patient presents with   • Shortness of Breath     Pt. states SOB with accompanying chest tightness. No s/s of difficulty breathing. Pt. is 92% on RA. Pt. talking in full and complete sentences. Pt. was seen here yesterday, and reported needing to be admitted for CHF exacerbation.    • Chest Pain     Pt. states left sided chest tightness that is exacerbated with inspiration and expiration. Pt. states it radiates across her chest at times.     Seen at 2:18 AM.   HPI  Arianne Irving is a 86 y.o. female who presents to the Emergency Department for possible chest tightness and shortness of breath.  The patient cannot give me any history as she does have some short-term memory loss.    She was evaluated in this emergency department yesterday for chest pain and shortness of breath.  At the time she was found to have new onset atrial fibrillation and possibly CHF.  Dr. Greer plan to admit the patient but the patient and the family wished to go home and signed out AGAINST MEDICAL ADVICE.    They returned tonight with similar complaints.  The patient has had some decreased activity today which concerned the family.  For some reason, they gave her a dose of Xanax tonight to help her sleep. (Which she had taken in the past but normally does not take on a daily basis) when she was walking upstairs to go to bed she complained of some chest tightness and appeared to have some difficulty breathing.  They then decided to bring her back to the emergency department.        REVIEW OF SYSTEMS  See HPI, patient currently denies any symptoms to include chest pain, fevers, shortness of breath, cough, nausea, vomiting, orthopnea.  The review of systems is limited due to possible underlying dementia and recent Xanax ingestion.    PAST MEDICAL HISTORY   has a  "past medical history of CAD (coronary artery disease) (May 2002); CHF (congestive heart failure) ([facility]) (7/30/2018); Dementia; Edema ( ); HLD (hyperlipidemia); HTN (hypertension); Obesity ( ); Psychiatric disorder; and Renal disorder.    SURGICAL HISTORY   has a past surgical history that includes hysterectomy, total abdominal; rotator cuff repair; tonsillectomy and adenoidectomy; knee arthroscopy; and gyn surgery.    SOCIAL HISTORY  Social History   Substance Use Topics   • Smoking status: Former Smoker     Types: Cigarettes     Quit date: 1/1/1967   • Smokeless tobacco: Never Used   • Alcohol use No      History   Drug Use No       FAMILY HISTORY  Family History   Problem Relation Age of Onset   • Heart Disease Brother    • Heart Disease Mother    • Heart Failure Mother    • Heart Attack Father        CURRENT MEDICATIONS  Reviewed.  See Encounter Summary.     ALLERGIES  Allergies   Allergen Reactions   • Nkda [No Known Drug Allergy]        PHYSICAL EXAM  VITAL SIGNS: /76   Pulse 80   Temp 36.2 °C (97.1 °F)   Resp 16   Ht 1.575 m (5' 2\")   Wt 72.7 kg (160 lb 4.4 oz)   SpO2 92%   BMI 29.31 kg/m²   Constitutional: Sleeping but easily arousable and stays awake throughout the duration of the patient interview and examination.  HENT: Normocephalic, Bilateral external ears normal. Nose normal.   Eyes: Conjunctiva normal, non-icteric, EOMI.    Thorax & Lungs: Easy unlabored respirations, faint occasional crackles at the bilateral bases, otherwise clear to ascultation bilaterally.  Cardiovascular: Irregularly irregular, No murmurs, rubs or gallops.  Abdomen:  Soft, nontender, nondistended, normal active bowel sounds.   :    Skin: Visualized skin is  Dry, No erythema, No rash.   Musculoskeletal:   No cyanosis, clubbing or edema.  Neurologic: Alert, Grossly non-focal.   Psychiatric: Normal affect, Normal mood  Lymphatic:  No cervical LAD    EKG   12 lead Interpreted by me  Rhythm: Atrial fibrillation  rate: " 54-90  Axis: normal  Ectopy: none  Conduction: normal  ST Segments: no acute change  T Waves: no acute change  Clinical Impression: Nonspecific T-wave changes, unchanged compared to yesterday's EKG    RADIOLOGY  DX-CHEST-PORTABLE (1 VIEW)   Final Result      Congestive heart failure.      Echocardiogram Comp W/O Cont    (Results Pending)         COURSE & MEDICAL DECISION MAKING  Pertinent Labs & Imaging studies reviewed. (See chart for details)    Differential diagnoses include but are not limited to: CHF, new onset atrial fibrillation.    2:18 AM - Medical record reviewed, patient seen and examined at bedside.    3:30 A M -Dr. Liu has been consulted for admission.    Decision Making:  This is a 86 y.o. year old female who presents with apparent chest tightness and shortness of breath.  The patient is quite pleasant.  She does have some short-term memory loss and is unable to give me any history of chest pain or shortness of breath.  She is hemodynamically stable.  She is borderline hypoxic on room air.  She does not have any signs of decompensated heart failure, her legs do not show any signs of edema, she does not have any significant rails on examination.  She does have new atrial fibrillation that was discovered yesterday.  The plan yesterday was to admit the patient for a 2D echo and possible diuresis.    I think this is still a reasonable plan today.  Given that she has some suggestion of chest pain, it is reasonable to admit her for a rule out of acute coronary syndrome, 2D echo and management of her atrial fibrillation.    The patient will be admitted to the hospitalist in stable condition    FINAL IMPRESSION  1. Atrial fibrillation, unspecified type (HCC)    2. Feeling of chest tightness    3. Congestive heart failure, unspecified HF chronicity, unspecified heart failure type (HCC)

## 2018-07-30 NOTE — H&P
Hospital Medicine History & Physical Note    Date of Service  7/30/2018    Primary Care Physician  Elli Dickerson M.D.    Consultants  None    Code Status  Full code     Chief Complaint  Shortness of breath     History of Presenting Illness  86 y.o. female with advanced dementia which is stable, essential hypertension which is controlled with current medication regimen, and dyslipidemia on statin therapy, with prior history of coronary artery disease status post percutaneous coronary intervention, was apparently in her usual state of health until approximately 3 weeks prior to admission.  Family members began to notice that during periods of exercise she would develop shortness of breath, which became worse with exercise and improved with rest.  These episodes were few and far between until approximately 3-4 days prior to admission they became more prolonged.  In addition on the day prior to admission she developed shortness of breath while sitting, lasted for approximately 8 hours.  She was subsequently brought to the emergency department for further evaluation, however she refused to stay and family took her home.  Only a few hours later she presents again with shortness of breath.  Currently she can provide none of her own history due to her dementia.    Review of Systems  Review of Systems   Unable to perform ROS: Dementia       Past Medical History   has a past medical history of CAD (coronary artery disease) (May 2002); CHF (congestive heart failure) (HCC) (7/30/2018); Dementia; Edema ( ); HLD (hyperlipidemia); HTN (hypertension); Obesity ( ); Psychiatric disorder; and Renal disorder. She also has no past medical history of Asthma; Cancer (HCC); Chronic obstructive pulmonary disease (HCC); Diabetes (HCC); Infectious disease; Liver disease; Seizure disorder (HCC); or Stroke (HCC).    Surgical History   has a past surgical history that includes hysterectomy, total abdominal; rotator cuff repair; tonsillectomy  and adenoidectomy; knee arthroscopy; and gyn surgery.     Family History  family history includes Heart Attack in her father; Heart Disease in her brother and mother; Heart Failure in her mother.     Social History   reports that she quit smoking about 51 years ago. Her smoking use included Cigarettes. She has never used smokeless tobacco. She reports that she does not drink alcohol or use drugs.    Allergies  Allergies   Allergen Reactions   • Nkda [No Known Drug Allergy]        Medications  Prior to Admission Medications   Prescriptions Last Dose Informant Patient Reported? Taking?   NAMENDA XR 28 MG CAPSULE SR 24 HR 7/29/2018 at Unknown time  Yes No   Nisoldipine 17 MG TABLET SR 24 HR 7/29/2018 at Unknown time  No No   Sig: TAKE 1 TABLET BY MOUTH EVERY DAY   amLODIPine (NORVASC) 2.5 MG Tab 7/28/2018 at Unknown time  No No   Sig: TAKE 1 TABLET BY MOUTH ONCE DAILY *NEED  TO  BE  SEEN  FOR  REFILLS*   aspirin (ASA) 81 MG CHEW 7/28/2018 at Unknown time  Yes No   Sig: Take 81 mg by mouth every day.   atorvastatin (LIPITOR) 20 MG Tab 7/28/2018 at Unknown time  No No   Sig: TAKE ONE TABLET BY MOUTH IN THE EVENING   esomeprazole (NEXIUM) 40 MG capsule 7/28/2018 at Unknown time  Yes No   Sig: Take 40 mg by mouth every morning before breakfast.   estradiol (ESTRACE) 0.5 MG tablet 7/28/2018 at Unknown time  Yes No   Sig: Take 0.5 mg by mouth every day.   losartan-hydrochlorothiazide (HYZAAR) 100-12.5 MG per tablet 7/28/2018 at Unknown time  No No   Sig: TAKE ONE TABLET BY MOUTH ONCE DAILY   meloxicam (MOBIC) 15 MG tablet 7/29/2018 at Unknown time  Yes No   metoprolol SR (TOPROL XL) 25 MG TABLET SR 24 HR 7/28/2018 at Unknown time  No No   Sig: TAKE ONE TABLET BY MOUTH ONCE DAILY   nitroglycerin (NITROSTAT) 0.4 MG SUBL   No No   Sig: Place 1 Tab under tongue as needed for Chest Pain.   potassium Chloride ER (K-TAB) 20 MEQ Tab CR tablet 7/28/2018 at Unknown time  No No   Sig: TAKE ONE TABLET BY MOUTH ONCE DAILY   rivastigmine  (EXELON) 9.5 MG/24HR patch 7/28/2018 at Unknown time  Yes No   sertraline (ZOLOFT) 50 MG Tab 7/28/2018 at Unknown time  Yes No   Sig: Take 50 mg by mouth every day.   trazodone (DESYREL) 50 MG Tab 7/29/2018 at Unknown time  Yes No      Facility-Administered Medications: None       Physical Exam  Blood Pressure : 145/76   Temperature: 36.2 °C (97.1 °F)   Pulse: 80   Respiration: 16   Pulse Oximetry: 92 %     Physical Exam   Constitutional: She appears well-developed and well-nourished. No distress.   HENT:   Head: Normocephalic and atraumatic.   Eyes: Pupils are equal, round, and reactive to light. Conjunctivae are normal.   Neck: Normal range of motion. Neck supple. No tracheal deviation present. No thyromegaly present.   Cardiovascular: Normal rate, regular rhythm and normal heart sounds.  Exam reveals no gallop and no friction rub.    No murmur heard.  Pulmonary/Chest: Effort normal and breath sounds normal. No respiratory distress. She has no wheezes. She has no rales.   Abdominal: Soft. Bowel sounds are normal. She exhibits no distension. There is no tenderness. There is no rebound.   Musculoskeletal: Normal range of motion. She exhibits no edema.   Lymphadenopathy:     She has no cervical adenopathy.   Neurological: She is alert. No cranial nerve deficit.   Skin: Skin is warm and dry. She is not diaphoretic.   Psychiatric: She has a normal mood and affect.   Nursing note and vitals reviewed.      Laboratory:  Recent Labs      07/29/18   1220  07/30/18   0217   WBC  6.3  6.4   RBC  4.21  4.33   HEMOGLOBIN  12.7  13.0   HEMATOCRIT  37.8  38.9   MCV  89.8  89.8   MCH  30.2  30.0   MCHC  33.6  33.4*   RDW  45.0  44.5   PLATELETCT  160*  169   MPV  9.8  10.3     Recent Labs      07/29/18   1220  07/30/18   0217   SODIUM  133*  131*   POTASSIUM  3.8  3.1*   CHLORIDE  99  97   CO2  25  22   GLUCOSE  114*  149*   BUN  35*  32*   CREATININE  1.25  1.22   CALCIUM  9.5  9.6     Recent Labs      07/29/18   1220  07/30/18    0217   ALTSGPT  18  15   ASTSGOT  18  16   ALKPHOSPHAT  78  75   TBILIRUBIN  0.8  0.8   LIPASE  33  42   GLUCOSE  114*  149*     Recent Labs      07/29/18   1220   APTT  32.4   INR  1.00     Recent Labs      07/29/18   1220  07/30/18   0217   BNPBTYPENAT  590*  387*         Lab Results   Component Value Date    TROPONINI 0.02 07/30/2018       Urinalysis:    Lab Results   Component Value Date    SPECGRAVITY 1.015 09/01/2017    GLUCOSEUR Negative 09/01/2017    KETONES Negative 09/01/2017    NITRITE Negative 09/01/2017    WBCURINE 20-50 (A) 09/01/2017    RBCURINE 0-2 09/01/2017    BACTERIA Many (A) 09/01/2017    EPITHELCELL Moderate (A) 09/01/2017        Imaging:  DX-CHEST-PORTABLE (1 VIEW)   Final Result      Congestive heart failure.      Echocardiogram Comp W/O Cont    (Results Pending)         Assessment/Plan:  I anticipate this patient is appropriate for observation status at this time.    * Acute congestive heart failure (HCC)   Assessment & Plan    Unclear if systolic or diastolic.  Echocardiogram remains pending.  Start diuresis given symptoms of shortness of breath and mild edema.  Monitor weights, 2 g sodium diet.  Already taking beta blocker therapy which will be continued        Persistent atrial fibrillation (HCC)   Assessment & Plan    This is apparently a new diagnosis to the patient.  Rate is currently controlled, not on anticoagulation.  This will need to be discussed, as she has a higher chads 2 score        Mixed hyperlipidemia- (present on admission)   Assessment & Plan    Continue statin therapy.        Benign essential hypertension- (present on admission)   Assessment & Plan    Controlled with current medication regimen which will be continued.        Late onset Alzheimer's disease without behavioral disturbance   Assessment & Plan    Stable.        Stented coronary artery- (present on admission)   Assessment & Plan    Follows outpatient with Dr. Flor from cardiology.  Stable.            VTE  prophylaxis: SCD, lovenox

## 2018-07-30 NOTE — DISCHARGE PLANNING
Transitional Care Navigator:    Spoke with one of patient's daughters, Su, about possible home health services post discharge. Su will speak with her sisters, and contact [facility].  [facility] contact information left with Su.

## 2018-07-30 NOTE — PROGRESS NOTES
07/30/18 0902 - Discharge Outreacht - Pat. Is meanwhile admitted to hospital - therefore no call made.

## 2018-07-30 NOTE — PROGRESS NOTES
2 RN Skin Assessment done with Laura CAMARGO    Bilat ears red/blanching - foam ear protectors in place  Bilat elbows - pink/blanching  Sacrum - red/blanching  Bilat heels - red/boggy/blanching float boots applied  Small old scab above center of upper lip  Small scratch on inner calf of left leg

## 2018-07-30 NOTE — ED NOTES
Pt ambulated to restroom with assist of 1,. Pt had steady gait urine sample was obtained, pt back in bed with slight assist, family at bedside, bed in lowest position, call bell within reach.

## 2018-07-30 NOTE — PROGRESS NOTES
Received report from night shift RN Regi. Assumed care of patient. Patient is Afib on the monitor at this time. Patient declines any needs at this time. Plan of care discussed with patient. Patient is sitting up in bed with family present at bedside at this time. Bed locked and in the lowest position with call light within reach.

## 2018-07-30 NOTE — PROGRESS NOTES
Inpatient Anticoagulation Service Note    Date: 7/30/2018  Reason for Anticoagulation: Atrial Fibrillation   [id number] VASc Score: 8    Hemoglobin Value: 13  Hematocrit Value: 38.9  Lab Platelet Value: 169  Target INR: 2.0 to 3.0    INR from last 7 days     7/29/18  INR = 1        Dose from last 7 days     Date/Time Dose (mg)    07/30/18 1600  5        Average Dose (mg):  (new start)  Significant Interactions: Proton Pump Inhibitor, Aspirin, Statin  Bridge Therapy: No    Reversal Agent Administered: Not Applicable  Comments: 85 yo female admitted for CHF exacerbation found to have new onset afib. [id number] score of 8.  Pt with CrCl ~ 30 ml/min and not a good candidate for novel anticoagulants. Warfarin-drug interactions noted above.  Baseline H/H/Plts are WNL. No s/sx of overt bleeding noted. Will initiate warfarin at 5 mg po tonight followed by 2.5 mg po qday. Trend INR daily.     Plan:   initiate warfarin at 5 mg po tonight followed by 2.5 mg po qday. Trend INR daily.    Pharmacist suggested discharge dosing: new start warfarin. Dose to be determined pending future INR results.    Laxmi Claire, PharmD, BCOP

## 2018-07-31 ENCOUNTER — APPOINTMENT (OUTPATIENT)
Dept: CARDIOLOGY | Facility: MEDICAL CENTER | Age: 83
End: 2018-07-31
Attending: INTERNAL MEDICINE
Payer: MEDICARE

## 2018-07-31 PROBLEM — E83.42 HYPOMAGNESEMIA: Status: RESOLVED | Noted: 2018-07-31 | Resolved: 2018-07-31

## 2018-07-31 PROBLEM — D69.6 THROMBOCYTOPENIA (HCC): Status: ACTIVE | Noted: 2018-07-31

## 2018-07-31 PROBLEM — E87.6 HYPOKALEMIA: Status: ACTIVE | Noted: 2018-07-31

## 2018-07-31 PROBLEM — E83.42 HYPOMAGNESEMIA: Status: ACTIVE | Noted: 2018-07-31

## 2018-07-31 LAB
ANION GAP SERPL CALC-SCNC: 12 MMOL/L (ref 0–11.9)
ANION GAP SERPL CALC-SCNC: 9 MMOL/L (ref 0–11.9)
BASOPHILS # BLD AUTO: 0.8 % (ref 0–1.8)
BASOPHILS # BLD: 0.05 K/UL (ref 0–0.12)
BUN SERPL-MCNC: 28 MG/DL (ref 8–22)
BUN SERPL-MCNC: 30 MG/DL (ref 8–22)
CALCIUM SERPL-MCNC: 9 MG/DL (ref 8.5–10.5)
CALCIUM SERPL-MCNC: 9.1 MG/DL (ref 8.5–10.5)
CHLORIDE SERPL-SCNC: 94 MMOL/L (ref 96–112)
CHLORIDE SERPL-SCNC: 95 MMOL/L (ref 96–112)
CO2 SERPL-SCNC: 25 MMOL/L (ref 20–33)
CO2 SERPL-SCNC: 26 MMOL/L (ref 20–33)
CREAT SERPL-MCNC: 1.26 MG/DL (ref 0.5–1.4)
CREAT SERPL-MCNC: 1.5 MG/DL (ref 0.5–1.4)
EOSINOPHIL # BLD AUTO: 0.1 K/UL (ref 0–0.51)
EOSINOPHIL NFR BLD: 1.7 % (ref 0–6.9)
ERYTHROCYTE [DISTWIDTH] IN BLOOD BY AUTOMATED COUNT: 42 FL (ref 35.9–50)
EST. AVERAGE GLUCOSE BLD GHB EST-MCNC: 128 MG/DL
GLUCOSE SERPL-MCNC: 101 MG/DL (ref 65–99)
GLUCOSE SERPL-MCNC: 164 MG/DL (ref 65–99)
HBA1C MFR BLD: 6.1 % (ref 0–5.6)
HCT VFR BLD AUTO: 38.3 % (ref 37–47)
HGB BLD-MCNC: 13.3 G/DL (ref 12–16)
IMM GRANULOCYTES # BLD AUTO: 0.02 K/UL (ref 0–0.11)
IMM GRANULOCYTES NFR BLD AUTO: 0.3 % (ref 0–0.9)
INR PPP: 1 (ref 0.87–1.13)
LYMPHOCYTES # BLD AUTO: 1.35 K/UL (ref 1–4.8)
LYMPHOCYTES NFR BLD: 22.4 % (ref 22–41)
MAGNESIUM SERPL-MCNC: 1.4 MG/DL (ref 1.5–2.5)
MCH RBC QN AUTO: 30.4 PG (ref 27–33)
MCHC RBC AUTO-ENTMCNC: 34.7 G/DL (ref 33.6–35)
MCV RBC AUTO: 87.4 FL (ref 81.4–97.8)
MONOCYTES # BLD AUTO: 0.41 K/UL (ref 0–0.85)
MONOCYTES NFR BLD AUTO: 6.8 % (ref 0–13.4)
NEUTROPHILS # BLD AUTO: 4.09 K/UL (ref 2–7.15)
NEUTROPHILS NFR BLD: 68 % (ref 44–72)
NRBC # BLD AUTO: 0 K/UL
NRBC BLD-RTO: 0 /100 WBC
PLATELET # BLD AUTO: 152 K/UL (ref 164–446)
PMV BLD AUTO: 10.4 FL (ref 9–12.9)
POTASSIUM SERPL-SCNC: 2.6 MMOL/L (ref 3.6–5.5)
POTASSIUM SERPL-SCNC: 3.2 MMOL/L (ref 3.6–5.5)
PROTHROMBIN TIME: 12.9 SEC (ref 12–14.6)
RBC # BLD AUTO: 4.38 M/UL (ref 4.2–5.4)
SODIUM SERPL-SCNC: 130 MMOL/L (ref 135–145)
SODIUM SERPL-SCNC: 131 MMOL/L (ref 135–145)
WBC # BLD AUTO: 6 K/UL (ref 4.8–10.8)

## 2018-07-31 PROCEDURE — 51798 US URINE CAPACITY MEASURE: CPT

## 2018-07-31 PROCEDURE — 700102 HCHG RX REV CODE 250 W/ 637 OVERRIDE(OP): Performed by: HOSPITALIST

## 2018-07-31 PROCEDURE — 80048 BASIC METABOLIC PNL TOTAL CA: CPT

## 2018-07-31 PROCEDURE — 96366 THER/PROPH/DIAG IV INF ADDON: CPT

## 2018-07-31 PROCEDURE — A9270 NON-COVERED ITEM OR SERVICE: HCPCS | Performed by: HOSPITALIST

## 2018-07-31 PROCEDURE — 36415 COLL VENOUS BLD VENIPUNCTURE: CPT

## 2018-07-31 PROCEDURE — 83735 ASSAY OF MAGNESIUM: CPT

## 2018-07-31 PROCEDURE — 99225 PR SUBSEQUENT OBSERVATION CARE,LEVEL II: CPT | Performed by: HOSPITALIST

## 2018-07-31 PROCEDURE — G0378 HOSPITAL OBSERVATION PER HR: HCPCS

## 2018-07-31 PROCEDURE — 85610 PROTHROMBIN TIME: CPT

## 2018-07-31 PROCEDURE — 700111 HCHG RX REV CODE 636 W/ 250 OVERRIDE (IP): Performed by: HOSPITALIST

## 2018-07-31 PROCEDURE — 85025 COMPLETE CBC W/AUTO DIFF WBC: CPT

## 2018-07-31 RX ORDER — WARFARIN SODIUM 2.5 MG/1
2.5 TABLET ORAL
Qty: 30 TAB | Refills: 0 | Status: SHIPPED | OUTPATIENT
Start: 2018-08-01 | End: 2018-08-27 | Stop reason: SDUPTHER

## 2018-07-31 RX ORDER — POTASSIUM CHLORIDE 20 MEQ/1
40 TABLET, EXTENDED RELEASE ORAL ONCE
Status: DISCONTINUED | OUTPATIENT
Start: 2018-07-31 | End: 2018-07-31

## 2018-07-31 RX ORDER — MAGNESIUM SULFATE HEPTAHYDRATE 40 MG/ML
2 INJECTION, SOLUTION INTRAVENOUS ONCE
Status: COMPLETED | OUTPATIENT
Start: 2018-07-31 | End: 2018-07-31

## 2018-07-31 RX ORDER — WARFARIN SODIUM 5 MG/1
5 TABLET ORAL
Status: COMPLETED | OUTPATIENT
Start: 2018-07-31 | End: 2018-07-31

## 2018-07-31 RX ORDER — POTASSIUM CHLORIDE 7.45 MG/ML
10 INJECTION INTRAVENOUS
Status: COMPLETED | OUTPATIENT
Start: 2018-07-31 | End: 2018-07-31

## 2018-07-31 RX ORDER — WARFARIN SODIUM 2.5 MG/1
2.5 TABLET ORAL
Status: DISCONTINUED | OUTPATIENT
Start: 2018-08-01 | End: 2018-08-01 | Stop reason: HOSPADM

## 2018-07-31 RX ORDER — POTASSIUM CHLORIDE 20 MEQ/1
40 TABLET, EXTENDED RELEASE ORAL ONCE
Status: COMPLETED | OUTPATIENT
Start: 2018-07-31 | End: 2018-07-31

## 2018-07-31 RX ORDER — CALCIUM CARBONATE 500 MG/1
500 TABLET, CHEWABLE ORAL EVERY 4 HOURS PRN
Status: DISCONTINUED | OUTPATIENT
Start: 2018-07-31 | End: 2018-08-01 | Stop reason: HOSPADM

## 2018-07-31 RX ADMIN — RIVASTIGMINE TARTRATE 4.5 MG: 1.5 CAPSULE ORAL at 18:02

## 2018-07-31 RX ADMIN — POTASSIUM CHLORIDE 10 MEQ: 7.46 INJECTION, SOLUTION INTRAVENOUS at 05:21

## 2018-07-31 RX ADMIN — FUROSEMIDE 20 MG: 10 INJECTION, SOLUTION INTRAMUSCULAR; INTRAVENOUS at 05:38

## 2018-07-31 RX ADMIN — OMEPRAZOLE 20 MG: 20 CAPSULE, DELAYED RELEASE ORAL at 05:23

## 2018-07-31 RX ADMIN — AMLODIPINE BESYLATE 5 MG: 5 TABLET ORAL at 05:39

## 2018-07-31 RX ADMIN — MAGNESIUM SULFATE IN WATER 2 G: 40 INJECTION, SOLUTION INTRAVENOUS at 12:11

## 2018-07-31 RX ADMIN — ASPIRIN 81 MG: 81 TABLET, CHEWABLE ORAL at 05:23

## 2018-07-31 RX ADMIN — WARFARIN SODIUM 5 MG: 5 TABLET ORAL at 18:02

## 2018-07-31 RX ADMIN — POTASSIUM CHLORIDE 40 MEQ: 1500 TABLET, EXTENDED RELEASE ORAL at 03:57

## 2018-07-31 RX ADMIN — SERTRALINE 50 MG: 50 TABLET, FILM COATED ORAL at 18:02

## 2018-07-31 RX ADMIN — POTASSIUM CHLORIDE 10 MEQ: 7.46 INJECTION, SOLUTION INTRAVENOUS at 03:53

## 2018-07-31 RX ADMIN — POTASSIUM CHLORIDE 40 MEQ: 1500 TABLET, EXTENDED RELEASE ORAL at 14:30

## 2018-07-31 RX ADMIN — POTASSIUM CHLORIDE 20 MEQ: 1500 TABLET, EXTENDED RELEASE ORAL at 05:24

## 2018-07-31 RX ADMIN — LOSARTAN POTASSIUM 100 MG: 50 TABLET ORAL at 05:38

## 2018-07-31 RX ADMIN — MEMANTINE HYDROCHLORIDE 10 MG: 10 TABLET ORAL at 18:02

## 2018-07-31 RX ADMIN — RIVASTIGMINE TARTRATE 4.5 MG: 1.5 CAPSULE ORAL at 05:22

## 2018-07-31 RX ADMIN — TRAZODONE HYDROCHLORIDE 50 MG: 50 TABLET ORAL at 21:36

## 2018-07-31 RX ADMIN — MAGNESIUM SULFATE IN WATER 2 G: 40 INJECTION, SOLUTION INTRAVENOUS at 06:58

## 2018-07-31 RX ADMIN — METOPROLOL SUCCINATE 25 MG: 25 TABLET, EXTENDED RELEASE ORAL at 18:04

## 2018-07-31 RX ADMIN — MEMANTINE HYDROCHLORIDE 10 MG: 10 TABLET ORAL at 05:23

## 2018-07-31 RX ADMIN — ATORVASTATIN CALCIUM 20 MG: 20 TABLET, FILM COATED ORAL at 18:04

## 2018-07-31 ASSESSMENT — PAIN SCALES - GENERAL
PAINLEVEL_OUTOF10: 0

## 2018-07-31 ASSESSMENT — ENCOUNTER SYMPTOMS
CARDIOVASCULAR NEGATIVE: 1
EYES NEGATIVE: 1
SHORTNESS OF BREATH: 0
CONSTITUTIONAL NEGATIVE: 1
RESPIRATORY NEGATIVE: 1
GASTROINTESTINAL NEGATIVE: 1
COUGH: 0
MUSCULOSKELETAL NEGATIVE: 1
PSYCHIATRIC NEGATIVE: 1
NEUROLOGICAL NEGATIVE: 1

## 2018-07-31 NOTE — FACE TO FACE
Face to Face Supporting Documentation - Home Health    The encounter with this patient was in whole or in part the primary reason for home health admission.    Date of encounter:   Patient:                    MRN:                       YOB: 2018  Arianne Irving  [id number]  11/3/1931     Home health to see patient for:  Skilled Nursing care for assessment, interventions & education    Skilled need for:  New Onset Medical Diagnosis afib    Skilled nursing interventions to include:  Comment: exam, vitals, med review    Homebound status evidenced by:  Needs the assistance of another person in order to leave the home. Leaving home requires a considerable and taxing effort. There is a normal inability to leave the home.    Community Physician to provide follow up care: Elli Dickerson M.D.     Optional Interventions? No      I certify the face to face encounter for this home health care referral meets the CMS requirements and the encounter/clinical assessment with the patient was, in whole, or in part, for the medical condition(s) listed above, which is the primary reason for home health care. Based on my clinical findings: the service(s) are medically necessary, support the need for home health care, and the homebound criteria are met.  I certify that this patient has had a face to face encounter by myself.  Taty Berger M.D. - NPI: [id number]

## 2018-07-31 NOTE — PROGRESS NOTES
[facility] Hospitalist Progress Note    Date of Service: 2018    Chief Complaint  86 y.o. female admitted 2018 with shortness of breath    Interval Problem Update  States she is feeling much better  Sob has resolved  No cp  Ros otherwise negative  No evidence of chf on echo  Clinically now eu volemic  I suspect she had flash pulm edema from afib with rvr on admit that has now resolved with lasix and rate control    Consultants/Specialty      Disposition          Review of Systems   Constitutional: Negative.    HENT: Negative.    Eyes: Negative.    Respiratory: Negative.  Negative for cough and shortness of breath.    Cardiovascular: Negative.  Negative for chest pain and leg swelling.   Gastrointestinal: Negative.    Genitourinary: Negative.    Musculoskeletal: Negative.    Skin: Negative.    Neurological: Negative.    Endo/Heme/Allergies: Negative.    Psychiatric/Behavioral: Negative.       Physical Exam  Laboratory/Imaging   Hemodynamics  Temp (24hrs), Av.9 °C (98.4 °F), Min:36.7 °C (98 °F), Max:37.2 °C (99 °F)   Temperature: 36.7 °C (98 °F)  Pulse  Av.9  Min: 67  Max: 92    Blood Pressure : 124/75      Respiratory      Respiration: 18, Pulse Oximetry: 91 %     Work Of Breathing / Effort: (P) Mild  RUL Breath Sounds: (P) Clear, RML Breath Sounds: (P) Diminished, RLL Breath Sounds: (P) Diminished, TANA Breath Sounds: (P) Clear, LLL Breath Sounds: (P) Diminished    Fluids    Intake/Output Summary (Last 24 hours) at 18 1242  Last data filed at 18 0700   Gross per 24 hour   Intake                0 ml   Output             2950 ml   Net            -2950 ml       Nutrition  Orders Placed This Encounter   Procedures   • Diet Order 2 Gram Sodium     Standing Status:   Standing     Number of Occurrences:   1     Order Specific Question:   Diet:     Answer:   2 Gram Sodium [7]     Physical Exam   Constitutional: She appears well-developed and well-nourished. No distress.   HENT:   Head:  Normocephalic and atraumatic.   Mouth/Throat: Oropharynx is clear and moist.   Eyes: Conjunctivae are normal.   Neck: Normal range of motion. Neck supple.   Cardiovascular: Normal heart sounds and intact distal pulses.  Exam reveals no gallop and no friction rub.    No murmur heard.  Regularly irregular   Pulmonary/Chest: Effort normal. No respiratory distress. She has no wheezes. She has no rales. She exhibits no tenderness.   Decreased at bases   Abdominal: Soft. Bowel sounds are normal. She exhibits no distension and no mass. There is no tenderness. There is no rebound and no guarding.   Musculoskeletal: Normal range of motion. She exhibits no edema or tenderness.   Neurological: She is alert. She has normal reflexes. No cranial nerve deficit. She exhibits normal muscle tone. Coordination normal.   axox2   Skin: Skin is warm and dry. No rash noted. She is not diaphoretic. No erythema. No pallor.   Psychiatric: She has a normal mood and affect. Her behavior is normal. Judgment and thought content normal.   Nursing note and vitals reviewed.      Recent Labs      07/29/18   1220  07/30/18 0217  07/31/18   0025   WBC  6.3  6.4  6.0   RBC  4.21  4.33  4.38   HEMOGLOBIN  12.7  13.0  13.3   HEMATOCRIT  37.8  38.9  38.3   MCV  89.8  89.8  87.4   MCH  30.2  30.0  30.4   MCHC  33.6  33.4*  34.7   RDW  45.0  44.5  42.0   PLATELETCT  160*  169  152*   MPV  9.8  10.3  10.4     Recent Labs      07/30/18   0217  07/31/18   0025  07/31/18   1127   SODIUM  131*  131*  130*   POTASSIUM  3.1*  2.6*  3.2*   CHLORIDE  97  94*  95*   CO2  22  25  26   GLUCOSE  149*  101*  164*   BUN  32*  30*  28*   CREATININE  1.22  1.50*  1.26   CALCIUM  9.6  9.1  9.0     Recent Labs      07/29/18   1220  07/31/18   0025   APTT  32.4   --    INR  1.00  1.00     Recent Labs      07/29/18   1220  07/30/18   0217   BNPBTYPENAT  590*  387*              Assessment/Plan     Persistent atrial fibrillation (HCC)   Assessment & Plan    This is apparently a  new diagnosis to the patient.  Rate is currently controlled, not on anticoagulation.  This will need to be discussed, as she has a higher chads 2 score        Mixed hyperlipidemia- (present on admission)   Assessment & Plan    Continue statin therapy.        Benign essential hypertension- (present on admission)   Assessment & Plan    Controlled with current medication regimen which will be continued.        Late onset Alzheimer's disease without behavioral disturbance   Assessment & Plan    Stable.        Stented coronary artery- (present on admission)   Assessment & Plan    Follows outpatient with Dr. Flor from cardiology.  Stable.          Quality-Core Measures

## 2018-07-31 NOTE — PROGRESS NOTES
Patient care assumed, report received from night RN Nishant, plan of care discussed. Patient is sitting up in bed with family present at bedside. Bed locked and in the lowest position with call light within reach.

## 2018-07-31 NOTE — PROGRESS NOTES
Received report and care assumed. Patient A&Ox 2-3, patient unsure of date (is capable of reading the board, hard to determine). Pt. Reports no pain. Work of breathing even and unlabored on 0.5L nasal canula, does not wear home O2. Discussed POC. Call light within reach and pt. instructed to call when in need of assistance.  Denies any other needs at this time.

## 2018-07-31 NOTE — HEART FAILURE PROGRAM
.Cardiovascular Nurse Navigator () Advanced Heart Failure Program Inpatient Progress Note:     Chief Complaint: Shortness of breath    Please note that patient was diagnosed with HF by admitting hospitalist prior to echo being completed. Echo resulted on 7/30 at 1353.    Patient saw Dr. Flor in general cardiology clinic on 7/18/18 with c/o similar symptoms to this admission. He did not diagnose heart failure at the time.    TT to Dr. Berger to make her aware that HF would be a new dx for Mrs. Irving and as such a cardiology consult is indicated.    If HF has been ruled out now that echo results are posted, respectfully request that it be removed from the problems list so that staff are not confused about necessary interventions and patient does not receive education on a diagnosis she does not have.     Thank you and please call with questions, Esther

## 2018-07-31 NOTE — DISCHARGE SUMMARY
Discharge Summary    CHIEF COMPLAINT ON ADMISSION  Chief Complaint   Patient presents with   • Shortness of Breath     Pt. states SOB with accompanying chest tightness. No s/s of difficulty breathing. Pt. is 92% on RA. Pt. talking in full and complete sentences. Pt. was seen here yesterday, and reported needing to be admitted for CHF exacerbation.    • Chest Pain     Pt. states left sided chest tightness that is exacerbated with inspiration and expiration. Pt. states it radiates across her chest at times.       Reason for Admission  SOB     Admission Date  7/30/2018    CODE STATUS  Full Code    HPI & HOSPITAL COURSE  This is a 86 y.o. female here with shortness of breath.  She was found to be in afib, which is a new diagnosis.  She was also found to have pulmonary edema, which was likely transient due to afib with rvr.  She responded well to rate control and diuresis.  Her echo showed no evidence of significant CHF.  She was started on anticoagulation and is currently back at her baseline.     She did require electrolyte replacement and will require ongoing monitoring of her sodium, potassium and magnesium as an outpatient.  Her family is aware of this recommendation.  She will also be followed by the coumadin clinic and by her outpatient cardiologist.           Therefore, she is discharged in fair and stable condition to home with close outpatient follow-up.    The patient met 2-midnight criteria for an inpatient stay at the time of discharge.    Discharge Date  8/1/18    FOLLOW UP ITEMS POST DISCHARGE  Home health    DISCHARGE DIAGNOSES  Active Problems:    Benign essential hypertension POA: Yes    Mixed hyperlipidemia POA: Yes    Persistent atrial fibrillation (HCC) POA: Unknown    Stented coronary artery POA: Yes      Overview: remotely    Late onset Alzheimer's disease without behavioral disturbance POA: Unknown    Hypokalemia POA: Unknown    Thrombocytopenia (HCC) POA: Unknown  Resolved Problems:     Hypomagnesemia POA: Unknown      FOLLOW UP  Future Appointments  Date Time Provider Department Center   9/24/2018 12:45 PM Alonzo Flor M.D. [facility] None         In 1 week        MEDICATIONS ON DISCHARGE     Medication List      START taking these medications      Instructions   warfarin 2.5 MG Tabs  Commonly known as:  COUMADIN   Doctor's comments:  Next inr 8/2, further dose adjustment per coumadin clinic  Take 1 Tab by mouth COUMADIN-DAILY.  Dose:  2.5 mg        CONTINUE taking these medications      Instructions   amLODIPine 2.5 MG Tabs  Commonly known as:  NORVASC   Take 2.5 mg by mouth every day.  Dose:  2.5 mg     atorvastatin 20 MG Tabs  Commonly known as:  LIPITOR   TAKE ONE TABLET BY MOUTH IN THE EVENING     estradiol 0.5 MG tablet  Commonly known as:  ESTRACE   Take 0.5 mg by mouth every day.  Dose:  0.5 mg     losartan-hydrochlorothiazide 100-12.5 MG per tablet  Commonly known as:  HYZAAR   TAKE ONE TABLET BY MOUTH ONCE DAILY     metoprolol SR 25 MG Tb24  Commonly known as:  TOPROL XL   TAKE ONE TABLET BY MOUTH ONCE DAILY     NAMENDA XR 28 MG Cp24  Generic drug:  Memantine HCl ER   Take 28 mg by mouth every day.  Dose:  28 mg     NEXIUM 40 MG delayed-release capsule  Generic drug:  esomeprazole   Take 40 mg by mouth every morning before breakfast.  Dose:  40 mg     Nisoldipine 17 MG Tb24   TAKE 1 TABLET BY MOUTH EVERY DAY     nitroglycerin 0.4 MG Subl  Commonly known as:  NITROSTAT   Place 1 Tab under tongue as needed for Chest Pain.  Dose:  0.4 mg     potassium Chloride ER 20 MEQ Tbcr tablet  Commonly known as:  K-TAB   TAKE ONE TABLET BY MOUTH ONCE DAILY     rivastigmine 9.5 MG/24HR patch  Commonly known as:  EXELON   Apply 1 Patch to skin as directed every day.  Dose:  1 Patch     traZODone 50 MG Tabs  Commonly known as:  DESYREL   Take 50 mg by mouth every bedtime.  Dose:  50 mg     ZOLOFT 50 MG Tabs  Generic drug:  sertraline   Take 50 mg by mouth every day.  Dose:  50 mg        STOP taking  these medications    aspirin 81 MG Chew chewable tablet  Commonly known as:  ASA     meloxicam 15 MG tablet  Commonly known as:  MOBIC            Allergies  Allergies   Allergen Reactions   • Nkda [No Known Drug Allergy]        DIET  Orders Placed This Encounter   Procedures   • Diet Order 2 Gram Sodium     Standing Status:   Standing     Number of Occurrences:   1     Order Specific Question:   Diet:     Answer:   2 Gram Sodium [7]       ACTIVITY  As tolerated.    CONSULTATIONS      PROCEDURES  Echocardiogram Comp W/O Cont   Final Result      DX-CHEST-PORTABLE (1 VIEW)   Final Result      Congestive heart failure.            LABORATORY  Lab Results   Component Value Date    SODIUM 130 (L) 07/31/2018    POTASSIUM 3.2 (L) 07/31/2018    CHLORIDE 95 (L) 07/31/2018    CO2 26 07/31/2018    GLUCOSE 164 (H) 07/31/2018    BUN 28 (H) 07/31/2018    CREATININE 1.26 07/31/2018    CREATININE 0.9 11/14/2005        Lab Results   Component Value Date    WBC 6.0 07/31/2018    HEMOGLOBIN 13.3 07/31/2018    HEMATOCRIT 38.3 07/31/2018    PLATELETCT 152 (L) 07/31/2018        Total time of the discharge process exceeds 45 minutes.

## 2018-07-31 NOTE — PROGRESS NOTES
Called Dr. Cohen regarding critical lab value of potassium of 2.6 and magnesium of 1.4.  Dr. Cohen read back critical lab. Received orders for 40 mEq PO potassium and 20 mEq IV potassium and no orders for the magnesium. Also received orders to hold hydrochlorothiazide and administer lasix with morning medications.

## 2018-07-31 NOTE — PROGRESS NOTES
Called Dr. Cohen again regarding patient's Mg level of 1.4. Received orders this time for 2g IV replacement.

## 2018-07-31 NOTE — PROGRESS NOTES
Inpatient Anticoagulation Service Note    Date: 7/31/2018  Reason for Anticoagulation: Atrial Fibrillation   [id number] VASc Score: 8    Hemoglobin Value: 13.3  Hematocrit Value: 38.3  Lab Platelet Value: (!) 152  Target INR: 2.0 to 3.0    INR from last 7 days     Date/Time INR Value    07/31/18 0025  1      7/29/18                                                                                1     Dose from last 7 days     Date/Time Dose (mg)    07/31/18 0800  5    07/30/18 1600  5        Average Dose (mg):  (new start)  Significant Interactions: Proton Pump Inhibitor, Aspirin, Statin (sertraline)  Bridge Therapy: No      Reversal Agent Administered: Not Applicable  Comments: No change to INR after 1 dose of warfarin. H/H/Plts stable. No s/sx of overt bleeding noted per chart review. No changes to warfarin-drug interaction profile since last assessment. Give warfarin 5 mg po again tonight. Repeat INR in AM.     Plan: Warfarin 5 mg po again tonight followed by 2.5 mg po qday. Repeat INR in AM. Education Material Provided?: Yes (Warfarin counseling by Viktoria Tracey, [facility] pharmacy student)  Pharmacist suggested discharge dosing: Consider warfarin 5 mg po tonight followed by warfarin 2.5 mg po qday with follow up INR within 48 hours of discharge.      Laxmi Claire, PharmD, BCOP

## 2018-07-31 NOTE — CARE PLAN
Problem: Safety  Goal: Will remain free from injury  Outcome: PROGRESSING AS EXPECTED  Discussed with the patient the importance of calling for assistance prior to getting out of bed in order to help prevent falls since patient is confused and is running IV fluids. Patient accepting of the information. All questions answered at this time.

## 2018-08-01 ENCOUNTER — HOME HEALTH ADMISSION (OUTPATIENT)
Dept: HOME HEALTH SERVICES | Facility: HOME HEALTHCARE | Age: 83
End: 2018-08-01
Payer: MEDICARE

## 2018-08-01 ENCOUNTER — PATIENT OUTREACH (OUTPATIENT)
Dept: HEALTH INFORMATION MANAGEMENT | Facility: OTHER | Age: 83
End: 2018-08-01

## 2018-08-01 VITALS
TEMPERATURE: 97.8 F | HEART RATE: 82 BPM | HEIGHT: 62 IN | SYSTOLIC BLOOD PRESSURE: 120 MMHG | OXYGEN SATURATION: 99 % | RESPIRATION RATE: 17 BRPM | DIASTOLIC BLOOD PRESSURE: 60 MMHG | WEIGHT: 161.82 LBS | BODY MASS INDEX: 29.78 KG/M2

## 2018-08-01 LAB
ANION GAP SERPL CALC-SCNC: 9 MMOL/L (ref 0–11.9)
BASOPHILS # BLD AUTO: 0.7 % (ref 0–1.8)
BASOPHILS # BLD: 0.05 K/UL (ref 0–0.12)
BUN SERPL-MCNC: 32 MG/DL (ref 8–22)
CALCIUM SERPL-MCNC: 8.9 MG/DL (ref 8.5–10.5)
CHLORIDE SERPL-SCNC: 95 MMOL/L (ref 96–112)
CO2 SERPL-SCNC: 25 MMOL/L (ref 20–33)
CREAT SERPL-MCNC: 1.58 MG/DL (ref 0.5–1.4)
EOSINOPHIL # BLD AUTO: 0.13 K/UL (ref 0–0.51)
EOSINOPHIL NFR BLD: 1.8 % (ref 0–6.9)
ERYTHROCYTE [DISTWIDTH] IN BLOOD BY AUTOMATED COUNT: 43.5 FL (ref 35.9–50)
GLUCOSE SERPL-MCNC: 109 MG/DL (ref 65–99)
HCT VFR BLD AUTO: 40.1 % (ref 37–47)
HGB BLD-MCNC: 13.5 G/DL (ref 12–16)
IMM GRANULOCYTES # BLD AUTO: 0.03 K/UL (ref 0–0.11)
IMM GRANULOCYTES NFR BLD AUTO: 0.4 % (ref 0–0.9)
INR PPP: 0.97 (ref 0.87–1.13)
LYMPHOCYTES # BLD AUTO: 1.68 K/UL (ref 1–4.8)
LYMPHOCYTES NFR BLD: 23.9 % (ref 22–41)
MCH RBC QN AUTO: 30 PG (ref 27–33)
MCHC RBC AUTO-ENTMCNC: 33.7 G/DL (ref 33.6–35)
MCV RBC AUTO: 89.1 FL (ref 81.4–97.8)
MONOCYTES # BLD AUTO: 0.52 K/UL (ref 0–0.85)
MONOCYTES NFR BLD AUTO: 7.4 % (ref 0–13.4)
NEUTROPHILS # BLD AUTO: 4.62 K/UL (ref 2–7.15)
NEUTROPHILS NFR BLD: 65.8 % (ref 44–72)
NRBC # BLD AUTO: 0 K/UL
NRBC BLD-RTO: 0 /100 WBC
PLATELET # BLD AUTO: 165 K/UL (ref 164–446)
PMV BLD AUTO: 10.3 FL (ref 9–12.9)
POTASSIUM SERPL-SCNC: 3.5 MMOL/L (ref 3.6–5.5)
PROTHROMBIN TIME: 12.6 SEC (ref 12–14.6)
RBC # BLD AUTO: 4.5 M/UL (ref 4.2–5.4)
SODIUM SERPL-SCNC: 129 MMOL/L (ref 135–145)
WBC # BLD AUTO: 7 K/UL (ref 4.8–10.8)

## 2018-08-01 PROCEDURE — 85610 PROTHROMBIN TIME: CPT

## 2018-08-01 PROCEDURE — 700102 HCHG RX REV CODE 250 W/ 637 OVERRIDE(OP): Performed by: HOSPITALIST

## 2018-08-01 PROCEDURE — A9270 NON-COVERED ITEM OR SERVICE: HCPCS | Performed by: HOSPITALIST

## 2018-08-01 PROCEDURE — 85025 COMPLETE CBC W/AUTO DIFF WBC: CPT

## 2018-08-01 PROCEDURE — 80048 BASIC METABOLIC PNL TOTAL CA: CPT

## 2018-08-01 PROCEDURE — 36415 COLL VENOUS BLD VENIPUNCTURE: CPT

## 2018-08-01 PROCEDURE — 99217 PR OBSERVATION CARE DISCHARGE: CPT | Performed by: HOSPITALIST

## 2018-08-01 PROCEDURE — 96376 TX/PRO/DX INJ SAME DRUG ADON: CPT

## 2018-08-01 PROCEDURE — G0378 HOSPITAL OBSERVATION PER HR: HCPCS

## 2018-08-01 RX ORDER — POTASSIUM CHLORIDE 20 MEQ/1
20 TABLET, EXTENDED RELEASE ORAL ONCE
Status: COMPLETED | OUTPATIENT
Start: 2018-08-01 | End: 2018-08-01

## 2018-08-01 RX ADMIN — OMEPRAZOLE 20 MG: 20 CAPSULE, DELAYED RELEASE ORAL at 05:19

## 2018-08-01 RX ADMIN — RIVASTIGMINE TARTRATE 4.5 MG: 1.5 CAPSULE ORAL at 05:19

## 2018-08-01 RX ADMIN — AMLODIPINE BESYLATE 5 MG: 5 TABLET ORAL at 05:21

## 2018-08-01 RX ADMIN — POTASSIUM CHLORIDE 20 MEQ: 1500 TABLET, EXTENDED RELEASE ORAL at 07:46

## 2018-08-01 RX ADMIN — ASPIRIN 81 MG: 81 TABLET, CHEWABLE ORAL at 05:19

## 2018-08-01 RX ADMIN — POTASSIUM CHLORIDE 20 MEQ: 1500 TABLET, EXTENDED RELEASE ORAL at 05:19

## 2018-08-01 RX ADMIN — LOSARTAN POTASSIUM 100 MG: 50 TABLET ORAL at 05:20

## 2018-08-01 RX ADMIN — MEMANTINE HYDROCHLORIDE 10 MG: 10 TABLET ORAL at 05:19

## 2018-08-01 RX ADMIN — HYDROCHLOROTHIAZIDE 12.5 MG: 25 TABLET ORAL at 05:20

## 2018-08-01 RX ADMIN — STANDARDIZED SENNA CONCENTRATE AND DOCUSATE SODIUM 2 TABLET: 8.6; 5 TABLET, FILM COATED ORAL at 05:19

## 2018-08-01 ASSESSMENT — PAIN SCALES - GENERAL
PAINLEVEL_OUTOF10: 0
PAINLEVEL_OUTOF10: 0

## 2018-08-01 NOTE — PROGRESS NOTES
0745: Bedside report received.  Pt resting in bed with daughter at bedside.  Denies any pain or needs at this time.  POC discussed.  Bed locked in lowest position.  Bed alarm on.  Pt educated to call for assistance.    1135: Discharge teaching complete. Coumadin clinic appt scheduled for tomorrow morning.  POA educated to  coumadin as dose is scheduled for this evening. All questions answers.  Pt left unit via wheelchair by CNA.

## 2018-08-01 NOTE — DISCHARGE PLANNING
Received Choice form at 2518  Agency/Facility Name: [facility]  Referral sent per Choice form @ 1611

## 2018-08-01 NOTE — DISCHARGE INSTRUCTIONS
Discharge Instructions    Discharged to home by car with relative. Discharged via wheelchair, hospital escort: Yes.  Special equipment needed: Not Applicable    Be sure to schedule a follow-up appointment with your primary care doctor or any specialists as instructed.     Discharge Plan:   Influenza Vaccine Indication: Not indicated: Previously immunized this influenza season and > 8 years of age    I understand that a diet low in cholesterol, fat, and sodium is recommended for good health. Unless I have been given specific instructions below for another diet, I accept this instruction as my diet prescription.   Other diet: Cardiac, 2G Sodium    Special Instructions: None    · Is patient discharged on Warfarin / Coumadin?   Yes    You are receiving the drug warfarin. Please understand the importance of monitoring warfarin with scheduled PT/INR blood draws.  [facility]up with [facility] tomorrow, 8/2/2018 at as scheduled.    IMPORTANT: HOW TO USE THIS INFORMATION:  This is a summary and does NOT have all possible information about this product. This information does not assure that this product is safe, effective, or appropriate for you. This information is not individual medical advice and does not substitute for the advice of your health care professional. Always ask your health care professional for complete information about this product and your specific health needs.      WARFARIN - ORAL (WARF-uh-rin)      COMMON BRAND NAME(S): Coumadin      WARNING:  Warfarin can cause very serious (possibly fatal) bleeding. This is more likely to occur when you first start taking this medication or if you take too much warfarin. To decrease your risk for bleeding, your doctor or other health care provider will monitor you closely and check your lab results (INR test) to make sure you are not taking too much warfarin. Keep all medical and laboratory appointments. Tell your doctor right away if you notice any signs of  "serious bleeding. See also Side Effects section.      USES:  This medication is used to treat blood clots (such as in deep vein thrombosis-DVT or pulmonary embolus-PE) and/or to prevent new clots from forming in your body. Preventing harmful blood clots helps to reduce the risk of a stroke or heart attack. Conditions that increase your risk of developing blood clots include a certain type of irregular heart rhythm (atrial fibrillation), heart valve replacement, recent heart attack, and certain surgeries (such as hip/knee replacement). Warfarin is commonly called a \"blood thinner,\" but the more correct term is \"anticoagulant.\" It helps to keep blood flowing smoothly in your body by decreasing the amount of certain substances (clotting proteins) in your blood.      HOW TO USE:  Read the Medication Guide provided by your pharmacist before you start taking warfarin and each time you get a refill. If you have any questions, ask your doctor or pharmacist. Take this medication by mouth with or without food as directed by your doctor or other health care professional, usually once a day. It is very important to take it exactly as directed. Do not increase the dose, take it more frequently, or stop using it unless directed by your doctor. Dosage is based on your medical condition, laboratory tests (such as INR), and response to treatment. Your doctor or other health care provider will monitor you closely while you are taking this medication to determine the right dose for you. Use this medication regularly to get the most benefit from it. To help you remember, take it at the same time each day. It is important to eat a balanced, consistent diet while taking warfarin. Some foods can affect how warfarin works in your body and may affect your treatment and dose. Avoid sudden large increases or decreases in your intake of foods high in vitamin K (such as broccoli, cauliflower, cabbage, brussels sprouts, kale, spinach, and other " green leafy vegetables, liver, green tea, certain vitamin supplements). If you are trying to lose weight, check with your doctor before you try to go on a diet. Cranberry products may also affect how your warfarin works. Limit the amount of cranberry juice (16 ounces/480 milliliters a day) or other cranberry products you may drink or eat.      SIDE EFFECTS:  Nausea, loss of appetite, or stomach/abdominal pain may occur. If any of these effects persist or worsen, tell your doctor or pharmacist promptly. Remember that your doctor has prescribed this medication because he or she has judged that the benefit to you is greater than the risk of side effects. Many people using this medication do not have serious side effects. This medication can cause serious bleeding if it affects your blood clotting proteins too much (shown by unusually high INR lab results). Even if your doctor stops your medication, this risk of bleeding can continue for up to a week. Tell your doctor right away if you have any signs of serious bleeding, including: unusual pain/swelling/discomfort, unusual/easy bruising, prolonged bleeding from cuts or gums, persistent/frequent nosebleeds, unusually heavy/prolonged menstrual flow, pink/dark urine, coughing up blood, vomit that is bloody or looks like coffee grounds, severe headache, dizziness/fainting, unusual or persistent tiredness/weakness, bloody/black/tarry stools, chest pain, shortness of breath, difficulty swallowing. Tell your doctor right away if any of these unlikely but serious side effects occur: persistent nausea/vomiting, severe stomach/abdominal pain, yellowing eyes/skin. This drug rarely has caused very serious (possibly fatal) problems if its effects lead to small blood clots (usually at the beginning of treatment). This can lead to severe skin/tissue damage that may require surgery or amputation if left untreated. Patients with certain blood conditions (protein C or S deficiency) may  be at greater risk. Get medical help right away if any of these rare but serious side effects occur: painful/red/purplish patches on the skin (such as on the toe, breast, abdomen), change in the amount of urine, vision changes, confusion, slurred speech, weakness on one side of the body. A very serious allergic reaction to this drug is rare. However, get medical help right away if you notice any symptoms of a serious allergic reaction, including: rash, itching/swelling (especially of the face/tongue/throat), severe dizziness, trouble breathing. This is not a complete list of possible side effects. If you notice other effects not listed above, contact your doctor or pharmacist. In the [place] - Call your doctor for medical advice about side effects. You may report side effects to FDA at 1-702-BDD-0529. In [place] - Call your doctor for medical advice about side effects. You may report side effects to [facility] [place] at [phone].      PRECAUTIONS:  Before taking warfarin, tell your doctor or pharmacist if you are allergic to it; or if you have any other allergies. This product may contain inactive ingredients, which can cause allergic reactions or other problems. Talk to your pharmacist for more details. Before using this medication, tell your doctor or pharmacist your medical history, especially of: blood disorders (such as anemia, hemophilia), bleeding problems (such as bleeding of the stomach/intestines, bleeding in the brain), blood vessel disorders (such as aneurysms), recent major injury/surgery, liver disease, alcohol use, mental/mood disorders (including memory problems), frequent falls/injuries. It is important that all your doctors and dentists know that you take warfarin. Before having surgery or any medical/dental procedures, tell your doctor or dentist that you are taking this medication and about all the products you use (including prescription drugs, nonprescription drugs, and herbal products). Avoid  getting injections into the muscles. If you must have an injection into a muscle (for example, a flu shot), it should be given in the arm. This way, it will be easier to check for bleeding and/or apply pressure bandages. This medication may cause stomach bleeding. Daily use of alcohol while using this medicine will increase your risk for stomach bleeding and may also affect how this medication works. Limit or avoid alcoholic beverages. If you have not been eating well, if you have an illness or infection that causes fever, vomiting, or diarrhea for more than 2 days, or if you start using any antibiotic medications, contact your doctor or pharmacist immediately because these conditions can affect how warfarin works. This medication can cause heavy bleeding. To lower the chance of getting cut, bruised, or injured, use great caution with sharp objects like safety razors and nail cutters. Use an electric razor when shaving and a soft toothbrush when brushing your teeth. Avoid activities such as contact sports. If you fall or injure yourself, especially if you hit your head, call your doctor immediately. Your doctor may need to check you. The Food & Drug Administration has stated that generic warfarin products are interchangeable. However, consult your doctor or pharmacist before switching warfarin products. Be careful not to take more than one medication that contains warfarin unless specifically directed by the doctor or health care provider who is monitoring your warfarin treatment. Older adults may be at greater risk for bleeding while using this drug. This medication is not recommended for use during pregnancy because of serious (possibly fatal) harm to an unborn baby. Discuss the use of reliable forms of birth control with your doctor. If you become pregnant or think you may be pregnant, tell your doctor immediately. If you are planning pregnancy, discuss a plan for managing your condition with your doctor before  "you become pregnant. Your doctor may switch the type of medication you use during pregnancy. Very small amounts of this medication may pass into breast milk but is unlikely to harm a nursing infant. Consult your doctor before breast-feeding.      DRUG INTERACTIONS:  Drug interactions may change how your medications work or increase your risk for serious side effects. This document does not contain all possible drug interactions. Keep a list of all the products you use (including prescription/nonprescription drugs and herbal products) and share it with your doctor and pharmacist. Do not start, stop, or change the dosage of any medicines without your doctor's approval. Warfarin interacts with many prescription, nonprescription, vitamin, and herbal products. This includes medications that are applied to the skin or inside the vagina or rectum. The interactions with warfarin usually result in an increase or decrease in the \"blood-thinning\" (anticoagulant) effect. Your doctor or other health care professional should closely monitor you to prevent serious bleeding or clotting problems. While taking warfarin, it is very important to tell your doctor or pharmacist of any changes in medications, vitamins, or herbal products that you are taking. Some products that may interact with this drug include: capecitabine, imatinib, mifepristone. Aspirin, aspirin-like drugs (salicylates), and nonsteroidal anti-inflammatory drugs (NSAIDs such as ibuprofen, naproxen, celecoxib) may have effects similar to warfarin. These drugs may increase the risk of bleeding problems if taken during treatment with warfarin. Carefully check all prescription/nonprescription product labels (including drugs applied to the skin such as pain-relieving creams) since the products may contain NSAIDs or salicylates. Talk to your doctor about using a different medication (such as acetaminophen) to treat pain/fever. Low-dose aspirin and related drugs (such as " clopidogrel, ticlopidine) should be continued if prescribed by your doctor for specific medical reasons such as heart attack or stroke prevention. Consult your doctor or pharmacist for more details. Many herbal products interact with warfarin. Tell your doctor before taking any herbal products, especially bromelains, coenzyme Q10, cranberry, danshen, dong quai, fenugreek, garlic, ginkgo biloba, ginseng, and Issac's wort, among others. This medication may interfere with a certain laboratory test to measure theophylline levels, possibly causing false test results. Make sure laboratory personnel and all your doctors know you use this drug.      OVERDOSE:  If overdose is suspected, contact a poison control center or emergency room immediately. [place] residents can call the [place] National Poison Hotline at 1-419.282.7834. [place] residents can call a provincial poison control center. Symptoms of overdose may include: bloody/black/tarry stools, pink/dark urine, unusual/prolonged bleeding.      NOTES:  Do not share this medication with others. Laboratory and/or medical tests (such as INR, complete blood count) must be performed periodically to monitor your progress or check for side effects. Consult your doctor for more details.      MISSED DOSE:  For the best possible benefit, do not miss any doses. If you do miss a dose and remember on the same day, take it as soon as you remember. If you remember on the next day, skip the missed dose and resume your usual dosing schedule. Do not double the dose to catch up because this could increase your risk for bleeding. Keep a record of missed doses to give to your doctor or pharmacist. Contact your doctor or pharmacist if you miss 2 or more doses in a row.      STORAGE:  Store at room temperature away from light and moisture. Do not store in the bathroom. Keep all medications away from children and pets. Do not flush medications down the toilet or pour them into a drain unless  instructed to do so. Properly discard this product when it is  or no longer needed. Consult your pharmacist or local waste disposal company for more details about how to safely discard your product.      MEDICAL ALERT:  Your condition and medication can cause complications in a medical emergency. For information about enrolling in MedicAlert, call 1-881.893.4787 ([place]) or 1-131.287.6250 ([place]).      Information last revised 2010 Copyright(c)  [employer]             Depression / Suicide Risk    As you are discharged from this [facility] facility, it is important to learn how to keep safe from harming yourself.    Recognize the warning signs:  · Abrupt changes in personality, positive or negative- including increase in energy   · Giving away possessions  · Change in eating patterns- significant weight changes-  positive or negative  · Change in sleeping patterns- unable to sleep or sleeping all the time   · Unwillingness or inability to communicate  · Depression  · Unusual sadness, discouragement and loneliness  · Talk of wanting to die  · Neglect of personal appearance   · Rebelliousness- reckless behavior  · Withdrawal from people/activities they love  · Confusion- inability to concentrate     If you or a loved one observes any of these behaviors or has concerns about self-harm, here's what you can do:  · Talk about it- your feelings and reasons for harming yourself  · Remove any means that you might use to hurt yourself (examples: pills, rope, extension cords, firearm)  · Get professional help from the community (Mental Health, Substance Abuse, psychological counseling)  · Do not be alone:Call your Safe Contact- someone whom you trust who will be there for you.  · Call your local CRISIS HOTLINE 688-9434 or 365-684-8256  · Call your local Children's Mobile Crisis Response Team [place] (082) 022-5110 or www.[employer]  · Call the toll free National Suicide Prevention  Hotlines   · National Suicide Prevention Lifeline 445-153-CBMD (5467)  · [facility] 800-SUICIDE (529-2713)        Atrial Fibrillation  Introduction  Atrial fibrillation is a type of heartbeat that is irregular or fast (rapid). If you have this condition, your heart keeps quivering in a weird (chaotic) way. This condition can make it so your heart cannot pump blood normally. Having this condition gives a person more risk for stroke, heart failure, and other heart problems. There are different types of atrial fibrillation. Talk with your doctor to learn about the type that you have.  Follow these instructions at home:  · Take over-the-counter and prescription medicines only as told by your doctor.  · If your doctor prescribed a blood-thinning medicine, take it exactly as told. Taking too much of it can cause bleeding. If you do not take enough of it, you will not have the protection that you need against stroke and other problems.  · Do not use any tobacco products. These include cigarettes, chewing tobacco, and e-cigarettes. If you need help quitting, ask your doctor.  · If you have apnea (obstructive sleep apnea), manage it as told by your doctor.  · Do not drink alcohol.  · Do not drink beverages that have caffeine. These include coffee, soda, and tea.  · Maintain a healthy weight. Do not use diet pills unless your doctor says they are safe for you. Diet pills may make heart problems worse.  · Follow diet instructions as told by your doctor.  · Exercise regularly as told by your doctor.  · Keep all follow-up visits as told by your doctor. This is important.  Contact a doctor if:  · You notice a change in the speed, rhythm, or strength of your heartbeat.  · You are taking a blood-thinning medicine and you notice more bruising.  · You get tired more easily when you move or exercise.  Get help right away if:  · You have pain in your chest or your belly (abdomen).  · You have sweating or  weakness.  · You feel sick to your stomach (nauseous).  · You notice blood in your throw up (vomit), poop (stool), or pee (urine).  · You are short of breath.  · You suddenly have swollen feet and ankles.  · You feel dizzy.  · Your suddenly get weak or numb in your face, arms, or legs, especially if it happens on one side of your body.  · You have trouble talking, trouble understanding, or both.  · Your face or your eyelid droops on one side.  These symptoms may be an emergency. Do not wait to see if the symptoms will go away. Get medical help right away. Call your local emergency services (911 in the [place] Do not drive yourself to the hospital.   This information is not intended to replace advice given to you by your health care provider. Make sure you discuss any questions you have with your health care provider.  Document Released: 09/26/2009 Document Revised: 05/25/2017 Document Reviewed: 04/13/2016  © 2017 [employer]    Warfarin tablets  What is this medicine?  WARFARIN (WAR far in) is an anticoagulant. It is used to treat or prevent clots in the veins, arteries, lungs, or heart.  This medicine may be used for other purposes; ask your health care provider or pharmacist if you have questions.  COMMON BRAND NAME(S): Coumadin, Jantoven  What should I tell my health care provider before I take this medicine?  They need to know if you have any of these conditions:  -alcoholism  -anemia  -bleeding disorders  -cancer  -diabetes  -heart disease  -high blood pressure  -history of bleeding in the gastrointestinal tract  -history of stroke or other brain injury or disease  -kidney or liver disease  -protein C deficiency  -protein S deficiency  -psychosis or dementia  -recent injury, recent or planned surgery or procedure  -an unusual or allergic reaction to warfarin, other medicines, foods, dyes, or preservatives  -pregnant or trying to get pregnant  -breast-feeding  How should I use this medicine?  Take this medicine by  mouth with a glass of water. Follow the directions on the prescription label. You can take this medicine with or without food. Take your medicine at the same time each day. Do not take it more often than directed. Do not stop taking except on your doctor's advice. Stopping this medicine may increase your risk of a blood clot. Be sure to refill your prescription before you run out of medicine.  If your doctor or healthcare professional calls to change your dose, write down the dose and any other instructions. Always read the dose and instructions back to him or her to make sure you understand them. Tell your doctor or healthcare professional what strength of tablets you have on hand. Ask how many tablets you should take to equal your new dose. Write the date on the new instructions and keep them near your medicine. If you are told to stop taking your medicine until your next blood test, call your doctor or healthcare professional if you do not hear anything within 24 hours of the test to find out your new dose or when to restart your prior dose.  A special MedGuide will be given to you by the pharmacist with each prescription and refill. Be sure to read this information carefully each time.  Talk to your pediatrician regarding the use of this medicine in children. Special care may be needed.  Overdosage: If you think you have taken too much of this medicine contact a poison control center or emergency room at once.  NOTE: This medicine is only for you. Do not share this medicine with others.  What if I miss a dose?  It is important not to miss a dose. If you miss a dose, call your healthcare provider. Take the dose as soon as possible on the same day. If it is almost time for your next dose, take only that dose. Do not take double or extra doses to make up for a missed dose.  What may interact with this medicine?  Do not take this medicine with any of the following medications:  -agents that prevent or dissolve blood  clots  -aspirin or other salicylates  -danshen  -dextrothyroxine  -mifepristone  -Issac's Wort  -red yeast rice  This medicine may also interact with the following medications:  -acetaminophen  -agents that lower cholesterol  -alcohol  -allopurinol  -amiodarone  -antibiotics or medicines for treating bacterial, fungal or viral infections  -azathioprine  -barbiturate medicines for inducing sleep or treating seizures  -certain medicines for diabetes  -certain medicines for heart rhythm problems  -certain medicines for high blood pressure  -chloral hydrate  -cisapride  -disulfiram  -female hormones, including contraceptive or birth control pills  -general anesthetics  -herbal or dietary products like garlic, ginkgo, ginseng, green tea, or kava kava  -influenza virus vaccine  -male hormones  -medicines for mental depression or psychosis  -medicines for some types of cancer  -medicines for stomach problems  -methylphenidate  -NSAIDs, medicines for pain and inflammation, like ibuprofen or naproxen  -propoxyphene  -quinidine, quinine  -raloxifene  -seizure or epilepsy medicine like carbamazepine, phenytoin, and valproic acid  -steroids like cortisone and prednisone  -tamoxifen  -thyroid medicine  -tramadol  -vitamin c, vitamin e, and vitamin K  -zafirlukast  -zileuton  This list may not describe all possible interactions. Give your health care provider a list of all the medicines, herbs, non-prescription drugs, or dietary supplements you use. Also tell them if you smoke, drink alcohol, or use illegal drugs. Some items may interact with your medicine.  What should I watch for while using this medicine?  Visit your doctor or health care professional for regular checks on your progress. You will need to have a blood test called a PT/INR regularly. The PT/INR blood test is done to make sure you are getting the right dose of this medicine. It is important to not miss your appointment for the blood tests. When you first start  taking this medicine, these tests are done often. Once the correct dose is determined and you take your medicine properly, these tests can be done less often.  Notify your doctor or health care professional and seek emergency treatment if you develop breathing problems; changes in vision; chest pain; severe, sudden headache; pain, swelling, warmth in the leg; trouble speaking; sudden numbness or weakness of the face, arm or leg. These can be signs that your condition has gotten worse.  While you are taking this medicine, carry an identification card with your name, the name and dose of medicine(s) being used, and the name and phone number of your doctor or health care professional or person to contact in an emergency.  Do not start taking or stop taking any medicines or over-the-counter medicines except on the advice of your doctor or health care professional.  You should discuss your diet with your doctor or health care professional. Do not make major changes in your diet. Vitamin K can affect how well this medicine works. Many foods contain vitamin K. It is important to eat a consistent amount of foods with vitamin K. Other foods with vitamin K that you should eat in consistent amounts are asparagus, basil, beef or pork liver, black eyed peas, broccoli, brussel sprouts, cabbage, chickpeas, cucumber with peel, green onions, green tea, okra, parsley, peas, thyme, and green leafy vegetables like beet greens, andrés greens, endive, kale, mustard greens, spinach, turnip greens, watercress, or certain lettuces like green leaf or rachna.  This medicine can cause birth defects or bleeding in an unborn child. Women of childbearing age should use effective birth control while taking this medicine. If a woman becomes pregnant while taking this medicine, she should discuss the potential risks and her options with her health care professional.  Avoid sports and activities that might cause injury while you are using this  medicine. Severe falls or injuries can cause unseen bleeding. Be careful when using sharp tools or knives. Consider using an electric razor. Take special care brushing or flossing your teeth. Report any injuries, bruising, or red spots on the skin to your doctor or health care professional.  If you have an illness that causes vomiting, diarrhea, or fever for more than a few days, contact your doctor. Also check with your doctor if you are unable to eat for several days. These problems can change the effect of this medicine.  Even after you stop taking this medicine, it takes several days before your body recovers its normal ability to clot blood. Ask your doctor or health care professional how long you need to be careful. If you are going to have surgery or dental work, tell your doctor or health care professional that you have been taking this medicine.  What side effects may I notice from receiving this medicine?  Side effects that you should report to your doctor or health care professional as soon as possible:  -allergic reactions like skin rash, itching or hives, swelling of the face, lips, or tongue  -breathing problems  -chest pain  -dizziness  -headache  -heavy menstrual bleeding or vaginal bleeding  -pain in the lower back or side  -painful, blue or purple toes  -painful skin ulcers that do not go away  -signs and symptoms of bleeding such as bloody or black, tarry stools; red or dark-brown urine; spitting up blood or brown material that looks like coffee grounds; red spots on the skin; unusual bruising or bleeding from the eye, gums, or nose  -stomach pain  -unusually weak or tired  Side effects that usually do not require medical attention (report to your doctor or health care professional if they continue or are bothersome):  -diarrhea  -hair loss  This list may not describe all possible side effects. Call your doctor for medical advice about side effects. You may report side effects to FDA at  1-800-FDA-1088.  Where should I keep my medicine?  Keep out of the reach of children.  Store at room temperature between 15 and 30 degrees C (59 and 86 degrees F). Protect from light. Throw away any unused medicine after the expiration date. Do not flush down the toilet.  NOTE: This sheet is a summary. It may not cover all possible information. If you have questions about this medicine, talk to your doctor, pharmacist, or health care provider.  © 2018 Elsevier/Gold Standard (2016-10-18 14:36:10)        Vitamin K Foods and Warfarin  Warfarin is a blood thinner (anticoagulant). Anticoagulant medicines help prevent the formation of blood clots. These medicines work by decreasing the activity of vitamin K, which promotes normal blood clotting.  When you take warfarin, problems can occur from suddenly increasing or decreasing the amount of vitamin K that you eat from one day to the next. Problems may include:  · Blood clots.  · Bleeding.  What general guidelines do I need to follow?  To avoid problems when taking warfarin:  · Eat a balanced diet that includes:  ¨ Fresh fruits and vegetables.  ¨ Whole grains.  ¨ Low-fat dairy products.  ¨ Lean proteins, such as fish, eggs, and lean cuts of meat.  · Keep your intake of vitamin K consistent from day to day. To do this:  ¨ Avoid eating large amounts of vitamin K one day and low amounts of vitamin K the next day.  ¨ If you take a multivitamin that contains vitamin K, be sure to take it every day.  ¨ Know which foods contain vitamin K. Use the lists below to understand serving sizes and the amount of vitamin K in one serving.  · Avoid major changes in your diet. If you are going to change your diet, talk with your health care provider before making changes.  · Work with a nutrition specialist (dietitian) to develop a meal plan that works best for you.  High vitamin K foods  Foods that are high in vitamin K contain more than 100 mcg (micrograms) per serving. These  include:  · Broccoli (cooked) - ½ cup has 110 mcg.  · [place] sprouts (cooked) - ½ cup has 109 mcg.  · Greens, beet (cooked) - ½ cup has 350 mcg.  · Greens, andrés (cooked) - ½ cup has 418 mcg.  · Greens, turnip (cooked) - ½ cup has 265 mcg.  · Green onions or scallions - ½ cup has 105 mcg.  · Kale (fresh or frozen) - ½ cup has 531 mcg.  · Parsley (raw) - 10 sprigs has 164 mcg.  · Spinach (cooked) - ½ cup has 444 mcg.  · Swiss chard (cooked) - ½ cup has 287 mcg.  Moderate vitamin K foods  Foods that have a moderate amount of vitamin K contain  mcg per serving. These include:  · Asparagus (cooked) - 5 adler have 38 mcg.  · Black-eyed peas (dried) - ½ cup has 32 mcg.  · Cabbage (cooked) - ½ cup has 37 mcg.  · Kiwi fruit - 1 medium has 31 mcg.  · Lettuce - 1 cup has 57-63 mcg.  · Okra (frozen) - ½ cup has 44 mcg.  · Prunes (dried) - 5 prunes have 25 mcg.  · Watercress (raw) - 1 cup has 85 mcg.  Low vitamin K foods  Foods low in vitamin K contain less than 25 mcg per serving. These include:  · Artichoke - 1 medium has 18 mcg.  · Avocado - 1 oz. has 6 mcg.  · Blueberries - ½ cup has 14 mcg.  · Cabbage (raw) - ½ cup has 21 mcg.  · Carrots (cooked) - ½ cup has 11 mcg.  · Cauliflower (raw) - ½ cup has 11 mcg.  · Cucumber with peel (raw) - ½ cup has 9 mcg.  · Grapes - ½ cup has 12 mcg.  · Panora - 1 medium has 9 mcg.  · Nuts - 1 oz. has 15 mcg.  · Pear - 1 medium has 8 mcg.  · Peas (cooked) - ½ cup has 19 mcg.  · Pickles - 1 spear has 14 mcg.  · Pumpkin seeds - 1 oz. has 13 mcg.  · Sauerkraut (canned) - ½ cup has 16 mcg.  · Soybeans (cooked) - ½ cup has 16 mcg.  · Tomato (raw) - 1 medium has 10 mcg.  · Tomato sauce - ½ cup has 17 mcg.  Vitamin K-free foods  If a food contain less than 5 mcg per serving, it is considered to have no vitamin K. These foods include:  · Bread and cereal products.  · Cheese.  · Eggs.  · Fish and shellfish.  · Meat and poultry.  · Milk and dairy products.  · Sunflower seeds.  Actual amounts  of vitamin K in foods may be different depending on processing. Talk with your dietitian about what foods you can eat and what foods you should avoid.  This information is not intended to replace advice given to you by your health care provider. Make sure you discuss any questions you have with your health care provider.  Document Released: 10/15/2010 Document Revised: 07/09/2017 Document Reviewed: 03/22/2017  Elsevier Interactive Patient Education © 2017 Elsevier Inc.

## 2018-08-01 NOTE — DISCHARGE PLANNING
ATTN: Case Management  RE: Referral for Home Health                We would like to take this opportunity to thank you for submitting a referral for your patient to continue care with [facility]. Our skilled team is dedicated to helping all patients recover and gain independence in the home setting.            As of 8/1/2018, we have accepted the above patient into our service. A [facility] clinician will be out to see the patient within 48 hours to conduct our initial visit. If you have any questions or concerns regarding the patient’s transition to Home Health, please do not hesitate to contact us. We are open for referrals 7 days a week from 8AM to 5PM at [phone].      We look forward to collaborating with you,  [facility] Team

## 2018-08-01 NOTE — DISCHARGE PLANNING
Anticipated Discharge Disposition: D/C to Home with HH    Action: LSW met with pt and pt's daughters at bedside to discuss d/c planning and HH referral. Family is open to HH and signed choice for [facility]. LSW sent choice to [facility].    Barriers to Discharge: HH Acceptance.    Plan: Await acceptance from [facility].

## 2018-08-01 NOTE — DIETARY
Nutrition note:  Received consult for coumadin education.  Called RN and told her that [facility] does the coumadin education.  If pt has additional diet questions after meeting with [facility], call RD.

## 2018-08-02 ENCOUNTER — ANTICOAGULATION VISIT (OUTPATIENT)
Dept: VASCULAR LAB | Facility: MEDICAL CENTER | Age: 83
End: 2018-08-02
Attending: INTERNAL MEDICINE
Payer: MEDICARE

## 2018-08-02 ENCOUNTER — PATIENT OUTREACH (OUTPATIENT)
Dept: HEALTH INFORMATION MANAGEMENT | Facility: OTHER | Age: 83
End: 2018-08-02

## 2018-08-02 VITALS — SYSTOLIC BLOOD PRESSURE: 129 MMHG | DIASTOLIC BLOOD PRESSURE: 75 MMHG | HEART RATE: 67 BPM

## 2018-08-02 DIAGNOSIS — I48.19 PERSISTENT ATRIAL FIBRILLATION (HCC): ICD-10-CM

## 2018-08-02 LAB
INR BLD: 1.2 (ref 0.9–1.2)
INR PPP: 1.2 (ref 2–3.5)

## 2018-08-02 PROCEDURE — 99213 OFFICE O/P EST LOW 20 MIN: CPT

## 2018-08-02 PROCEDURE — 85610 PROTHROMBIN TIME: CPT

## 2018-08-02 NOTE — PROGRESS NOTES
Anticoagulation Summary  As of 8/2/2018    INR goal:   2.0-3.0   TTR:   --   Today's INR:   1.2!   Warfarin maintenance plan:   2.5 mg (2.5 mg x 1) every day   Weekly warfarin total:   17.5 mg   Plan last modified:   Lenora Tinoco, PharmD (8/2/2018)   Next INR check:   8/6/2018   Target end date:       Indications    Persistent atrial fibrillation (HCC) [I48.1]             Anticoagulation Episode Summary     INR check location:       Preferred lab:       Send INR reminders to:       Comments:         Anticoagulation Care Providers     Provider Role Specialty Phone number    Alonzo Flor M.D. Referring Cardiology [phone]    [facility] Responsible  [phone]        Anticoagulation Patient Findings    Pt is new to warfarin and new to [facility].  Discussed indication for warfarin therapy and INR goal range. Explained our services, hours of operation, warfarin therapy, potential SE, potential DI.. Discussed diet at length, with an emphasis on foods rich in vitamin K.  Discussed monitoring parameters, such as blood in urine, blood in stool, discussed what to do if a dose is missed, or suspected as missed.  Emphasized importance of compliance including follow up. Discussed lifestyle choices of ETOH & smoking and its impact on therapy.       Pt denies any unusual s/s of bleeding, bruising, clotting or any changes to diet or medications.    CHADSVASC = 4 (age, sex, htn)    Manual pulse 65    Added [facility] to care team  Pt is a new referral for anticoagulation management with atrial fibrillation.  Pt has been on warfarin for 48 hours.  ASA 81 mg was discontinued upon discharge from hospital (for stent placed >15 years ago)    Will continue warfarin 2.5mg po daily. Follow up in 3 days, to reduce risk of adverse events related to this high risk medication,  Warfarin.    Lenora Tinoco, PharmD

## 2018-08-03 ENCOUNTER — HOME CARE VISIT (OUTPATIENT)
Dept: HOME HEALTH SERVICES | Facility: HOME HEALTHCARE | Age: 83
End: 2018-08-03
Payer: MEDICARE

## 2018-08-03 VITALS
HEIGHT: 61 IN | OXYGEN SATURATION: 92 % | DIASTOLIC BLOOD PRESSURE: 68 MMHG | SYSTOLIC BLOOD PRESSURE: 124 MMHG | HEART RATE: 84 BPM | TEMPERATURE: 97.6 F | BODY MASS INDEX: 29.45 KG/M2 | RESPIRATION RATE: 18 BRPM | WEIGHT: 156 LBS

## 2018-08-03 PROCEDURE — 665001 SOC-HOME HEALTH

## 2018-08-03 PROCEDURE — 665998 HH PPS REVENUE CREDIT

## 2018-08-03 PROCEDURE — 665999 HH PPS REVENUE DEBIT

## 2018-08-03 PROCEDURE — G0493 RN CARE EA 15 MIN HH/HOSPICE: HCPCS

## 2018-08-03 SDOH — ECONOMIC STABILITY: HOUSING INSECURITY: UNSAFE APPLIANCES: 0

## 2018-08-03 SDOH — ECONOMIC STABILITY: HOUSING INSECURITY: UNSAFE COOKING RANGE AREA: 0

## 2018-08-03 ASSESSMENT — ENCOUNTER SYMPTOMS
DIFFICULTY THINKING: 1
POOR JUDGMENT: 1
SHORTNESS OF BREATH: T

## 2018-08-03 ASSESSMENT — PATIENT HEALTH QUESTIONNAIRE - PHQ9
2. FEELING DOWN, DEPRESSED, IRRITABLE, OR HOPELESS: 00
1. LITTLE INTEREST OR PLEASURE IN DOING THINGS: 00

## 2018-08-03 ASSESSMENT — ACTIVITIES OF DAILY LIVING (ADL)
OASIS_M1830: 03
HOME_HEALTH_OASIS: 02

## 2018-08-04 PROCEDURE — 665999 HH PPS REVENUE DEBIT

## 2018-08-04 PROCEDURE — 665998 HH PPS REVENUE CREDIT

## 2018-08-05 PROCEDURE — 665999 HH PPS REVENUE DEBIT

## 2018-08-05 PROCEDURE — 665998 HH PPS REVENUE CREDIT

## 2018-08-06 ENCOUNTER — TELEPHONE (OUTPATIENT)
Dept: HEALTH INFORMATION MANAGEMENT | Facility: OTHER | Age: 83
End: 2018-08-06

## 2018-08-06 ENCOUNTER — HOME CARE VISIT (OUTPATIENT)
Dept: HOME HEALTH SERVICES | Facility: HOME HEALTHCARE | Age: 83
End: 2018-08-06
Payer: MEDICARE

## 2018-08-06 ENCOUNTER — ANTICOAGULATION MONITORING (OUTPATIENT)
Dept: VASCULAR LAB | Facility: MEDICAL CENTER | Age: 83
End: 2018-08-06

## 2018-08-06 DIAGNOSIS — I48.19 PERSISTENT ATRIAL FIBRILLATION (HCC): ICD-10-CM

## 2018-08-06 LAB — INR PPP: 1.5 (ref 2–3.5)

## 2018-08-06 PROCEDURE — 665998 HH PPS REVENUE CREDIT

## 2018-08-06 PROCEDURE — G0299 HHS/HOSPICE OF RN EA 15 MIN: HCPCS

## 2018-08-06 PROCEDURE — 665999 HH PPS REVENUE DEBIT

## 2018-08-06 SDOH — ECONOMIC STABILITY: HOUSING INSECURITY
HOME SAFETY: WEARING SOCK ONLY, INSTRUCTED TO PUT ON SHOES . PATIENTS BEDROOM IS UPSTAIRS AND SHE IS NOT SAFE TO GO UP THE STAIRS BY HERSELF .INSTRUCTED THE FAMILY NOT TO LET HER GO UP BY HERSELF.

## 2018-08-06 NOTE — PROGRESS NOTES
Anticoagulation Summary  As of 8/6/2018    INR goal:   2.0-3.0   TTR:   --   Today's INR:   1.5!   Warfarin maintenance plan:   2.5 mg (2.5 mg x 1) every day   Weekly warfarin total:   17.5 mg   Plan last modified:   Lenora Tinoco PharmD (8/2/2018)   Next INR check:   8/9/2018   Target end date:       Indications    Persistent atrial fibrillation (HCC) [I48.1]             Anticoagulation Episode Summary     INR check location:       Preferred lab:       Send INR reminders to:       Comments:         Anticoagulation Care Providers     Provider Role Specialty Phone number    Alonzo Flor M.D. Referring Cardiology [phone]    [facility] Responsible  [phone]        Anticoagulation Patient Findings  Patient Findings     Negatives:   Signs/symptoms of thrombosis, Signs/symptoms of bleeding, Laboratory test error suspected, Change in health, Change in alcohol use, Change in activity, Upcoming invasive procedure, Emergency department visit, Upcoming dental procedure, Missed doses, Extra doses, Change in medications, Change in diet/appetite, Hospital admission, Bruising, Other complaints        Spoke with the patient's daughter, Bijan, on the phone today, reporting a SUB-therapeutic INR of 1.5.  Confirmed the current warfarin dosing regimen and patient compliance. Patient denies any interval changes to diet and/or medications. Patient denies any signs/symptoms of bleeding or clotting.  Patient will bolus with 5mg TONIGHT ONLY, then will resume her current regimen of 2.5mg QD. Patient will follow up in 3 days when [facility] will return to see her.  Orders sent to [facility].    Rossy Andino  PharmD

## 2018-08-06 NOTE — TELEPHONE ENCOUNTER
Received referral from [facility]. Medications reviewed against discharge summary. Interaction noted between voltaren gel and warfarin for increased risk of bleeding. Outbound call to Arianne. Spoke with patient's daughter Simin who manages patient's medications. Per Simin, patient uses voltaren seldomly - about once every 3 weeks or so. Reviewed signs/ symptoms of bleeding and when to seek medical attention. Therapeutic duplication with nisoldipine and amlodipine prescribed by cardiology. Simin states patient is tolerating these medications well - denies swelling. Simin reports that patient's BP has been controlled with this regimen and patient does not have difficulty affording her medications. Recommended renal dosing of Namenda XR with CrCl of 5-29 mL/min is 14 mg daily. Patient's most recent CrCl was 23 mL/min. Notified Angelito at Dr. Nuñez who is prescribing patient's Namenda XR.     Shawna Gloria, RigoD

## 2018-08-07 VITALS
RESPIRATION RATE: 18 BRPM | HEART RATE: 89 BPM | SYSTOLIC BLOOD PRESSURE: 120 MMHG | DIASTOLIC BLOOD PRESSURE: 80 MMHG | BODY MASS INDEX: 28.93 KG/M2 | TEMPERATURE: 98.3 F | WEIGHT: 153.13 LBS | OXYGEN SATURATION: 95 %

## 2018-08-07 PROCEDURE — 665998 HH PPS REVENUE CREDIT

## 2018-08-07 PROCEDURE — 665999 HH PPS REVENUE DEBIT

## 2018-08-08 ENCOUNTER — HOME CARE VISIT (OUTPATIENT)
Dept: HOME HEALTH SERVICES | Facility: HOME HEALTHCARE | Age: 83
End: 2018-08-08
Payer: MEDICARE

## 2018-08-08 PROCEDURE — 665998 HH PPS REVENUE CREDIT

## 2018-08-08 PROCEDURE — G0151 HHCP-SERV OF PT,EA 15 MIN: HCPCS

## 2018-08-08 PROCEDURE — 665999 HH PPS REVENUE DEBIT

## 2018-08-09 ENCOUNTER — ANTICOAGULATION MONITORING (OUTPATIENT)
Dept: VASCULAR LAB | Facility: MEDICAL CENTER | Age: 83
End: 2018-08-09

## 2018-08-09 ENCOUNTER — HOME CARE VISIT (OUTPATIENT)
Dept: HOME HEALTH SERVICES | Facility: HOME HEALTHCARE | Age: 83
End: 2018-08-09
Payer: MEDICARE

## 2018-08-09 VITALS
TEMPERATURE: 98.3 F | SYSTOLIC BLOOD PRESSURE: 130 MMHG | OXYGEN SATURATION: 94 % | DIASTOLIC BLOOD PRESSURE: 80 MMHG | RESPIRATION RATE: 18 BRPM | WEIGHT: 154.38 LBS | BODY MASS INDEX: 29.17 KG/M2 | HEART RATE: 82 BPM

## 2018-08-09 VITALS
DIASTOLIC BLOOD PRESSURE: 78 MMHG | OXYGEN SATURATION: 94 % | SYSTOLIC BLOOD PRESSURE: 122 MMHG | RESPIRATION RATE: 20 BRPM | HEART RATE: 83 BPM | TEMPERATURE: 97.9 F

## 2018-08-09 DIAGNOSIS — I48.19 PERSISTENT ATRIAL FIBRILLATION (HCC): ICD-10-CM

## 2018-08-09 LAB — INR PPP: 2 (ref 2–3.5)

## 2018-08-09 PROCEDURE — G0299 HHS/HOSPICE OF RN EA 15 MIN: HCPCS

## 2018-08-09 PROCEDURE — 665999 HH PPS REVENUE DEBIT

## 2018-08-09 PROCEDURE — 665998 HH PPS REVENUE CREDIT

## 2018-08-09 SDOH — ECONOMIC STABILITY: HOUSING INSECURITY
HOME SAFETY: PT LIVES WITH 2 DAUGHTERS AND THEY HELP AS NEEDED    2 STORY HOME AND HAS A STOOL AT THE MID POINT FOR ALL ROOMS ARE UPSTAIRS

## 2018-08-09 SDOH — ECONOMIC STABILITY: HOUSING INSECURITY: UNSAFE APPLIANCES: 0

## 2018-08-09 SDOH — ECONOMIC STABILITY: HOUSING INSECURITY: UNSAFE COOKING RANGE AREA: 0

## 2018-08-09 ASSESSMENT — ACTIVITIES OF DAILY LIVING (ADL)
GROOMING_ASSISTANCE: 0
TOILETING_ASSISTANCE: 0
DRESSING_LB_ASSISTANCE: 0
ORAL_CARE_ASSISTANCE: 0
HOUSEKEEPING_ASSISTANCE: 2
BATHING_ASSISTANCE: 0
TRANSPORTATION_ASSISTANCE: 6
EATING_ASSISTANCE: 0
BATHING_ASSISTANCE: 1
LAUNDRY_ASSISTANCE: 6
DRESSING_UB_ASSISTANCE: 0
SHOPPING_ASSISTANCE: 6
TELEPHONE_ASSISTANCE: 6
MEAL_PREP_ASSISTANCE: 6

## 2018-08-09 ASSESSMENT — BALANCE ASSESSMENTS
ATTEMPTS TO ARISE: 2
NUDGED: 2
STANDING BALANCE: 1
TURNING 360 DEGREES STEADINESS: 1
SITTING DOWN: 1
TURNING 360 DEGREES STEPS: 1
ARISES: 1
SURVEY COMPLETE: TRUE
BALANCE SCORE: 13
SITTING BALANCE: 1
IMMEDIATE STANDING BALANCE FIRST 5 SECONDS: 2
EYES CLOSED AT MAXIMUM POSITION NUDGED: 1

## 2018-08-09 ASSESSMENT — GAIT ASSESSMENTS
STEP CONTINUITY: 1
LEFT STEP CLEAR: 1
BALANCE AND GAIT SCORE: 22
STEP SYMMETRY: 1
RIGHT STEP CLEAR: 1
SURVEY COMPLETE: TRUE
TIME: 0
PATH: 1
TRUNK: 1
RIGHT STEP PASS: 1
INITIATION OF GAIT IMMEDIATELY AFTER GO: 1
LEFT STEP PASS: 1
GAIT SCORE: 9

## 2018-08-09 ASSESSMENT — ENCOUNTER SYMPTOMS
VOMITING: DENIES
VOMITING: DENIES
DIFFICULTY THINKING: 1
NAUSEA: DENIES
NAUSEA: DENIES
DIFFICULTY THINKING: 1

## 2018-08-09 NOTE — PROGRESS NOTES
OP Anticoagulation Telephone Note    Date: 8/9/2018  Anticoagulation Summary  As of 8/9/2018    INR goal:   2.0-3.0   TTR:   --   Today's INR:   2.0   Warfarin maintenance plan:   2.5 mg (2.5 mg x 1) every day   Weekly warfarin total:   17.5 mg   No change documented:   Vannessa De, Med Ass't   Plan last modified:   Lenora Tinoco, PharmD (8/2/2018)   Next INR check:   8/16/2018   Target end date:       Indications    Persistent atrial fibrillation (HCC) [I48.1]             Anticoagulation Episode Summary     INR check location:       Preferred lab:       Send INR reminders to:       Comments:         Anticoagulation Care Providers     Provider Role Specialty Phone number    Alonzo Flor M.D. Referring Cardiology [phone]    [facility] Responsible  [phone]        Anticoagulation Patient Findings  Patient Findings     Negatives:   Signs/symptoms of thrombosis, Signs/symptoms of bleeding, Laboratory test error suspected, Change in health, Change in alcohol use, Change in activity, Upcoming invasive procedure, Emergency department visit, Upcoming dental procedure, Missed doses, Extra doses, Change in medications, Change in diet/appetite, Hospital admission, Bruising, Other complaints      Plan:  Spoke with patient's Home health nurse  on the phone. Patient is therapeutic today. Patient denies any changes in medications or diet. Patient denies any signs or symptoms of bleeding or clotting. Instructed patient to call clinic if any unusual bleeding or bruising occurs. Will continue dosing as outlined above. Will follow-up with patient in 1 week.    Vannessa De, Medical Assistant    I have reviewed and concur with the above plan     Kamlesh Keller, RigoD

## 2018-08-10 PROCEDURE — 665998 HH PPS REVENUE CREDIT

## 2018-08-10 PROCEDURE — 665999 HH PPS REVENUE DEBIT

## 2018-08-11 PROCEDURE — 665998 HH PPS REVENUE CREDIT

## 2018-08-11 PROCEDURE — 665999 HH PPS REVENUE DEBIT

## 2018-08-12 PROCEDURE — 665998 HH PPS REVENUE CREDIT

## 2018-08-12 PROCEDURE — 665999 HH PPS REVENUE DEBIT

## 2018-08-13 PROCEDURE — 665998 HH PPS REVENUE CREDIT

## 2018-08-13 PROCEDURE — 665999 HH PPS REVENUE DEBIT

## 2018-08-14 PROCEDURE — 665999 HH PPS REVENUE DEBIT

## 2018-08-14 PROCEDURE — 665998 HH PPS REVENUE CREDIT

## 2018-08-15 ENCOUNTER — HOME CARE VISIT (OUTPATIENT)
Dept: HOME HEALTH SERVICES | Facility: HOME HEALTHCARE | Age: 83
End: 2018-08-15
Payer: MEDICARE

## 2018-08-15 VITALS
RESPIRATION RATE: 18 BRPM | HEART RATE: 78 BPM | DIASTOLIC BLOOD PRESSURE: 60 MMHG | TEMPERATURE: 97.5 F | SYSTOLIC BLOOD PRESSURE: 120 MMHG | OXYGEN SATURATION: 90 %

## 2018-08-15 PROCEDURE — G0157 HHC PT ASSISTANT EA 15: HCPCS

## 2018-08-15 PROCEDURE — 665998 HH PPS REVENUE CREDIT

## 2018-08-15 PROCEDURE — 665999 HH PPS REVENUE DEBIT

## 2018-08-16 ENCOUNTER — ANTICOAGULATION MONITORING (OUTPATIENT)
Dept: VASCULAR LAB | Facility: MEDICAL CENTER | Age: 83
End: 2018-08-16

## 2018-08-16 ENCOUNTER — HOME CARE VISIT (OUTPATIENT)
Dept: HOME HEALTH SERVICES | Facility: HOME HEALTHCARE | Age: 83
End: 2018-08-16
Payer: MEDICARE

## 2018-08-16 VITALS
TEMPERATURE: 98.3 F | DIASTOLIC BLOOD PRESSURE: 70 MMHG | HEART RATE: 81 BPM | BODY MASS INDEX: 28.53 KG/M2 | OXYGEN SATURATION: 93 % | WEIGHT: 151 LBS | SYSTOLIC BLOOD PRESSURE: 106 MMHG | RESPIRATION RATE: 16 BRPM

## 2018-08-16 DIAGNOSIS — I48.19 PERSISTENT ATRIAL FIBRILLATION (HCC): ICD-10-CM

## 2018-08-16 LAB — INR PPP: 1.9 (ref 2–3.5)

## 2018-08-16 PROCEDURE — 665999 HH PPS REVENUE DEBIT

## 2018-08-16 PROCEDURE — 665998 HH PPS REVENUE CREDIT

## 2018-08-16 PROCEDURE — G0299 HHS/HOSPICE OF RN EA 15 MIN: HCPCS

## 2018-08-17 PROCEDURE — 665998 HH PPS REVENUE CREDIT

## 2018-08-17 PROCEDURE — 665999 HH PPS REVENUE DEBIT

## 2018-08-17 NOTE — PROGRESS NOTES
Anticoagulation Summary  As of 8/16/2018    INR goal:   2.0-3.0   TTR:   0.0 % (4 d)   Today's INR:   1.9!   Warfarin maintenance plan:   3.75 mg (2.5 mg x 1.5) on Thu; 2.5 mg (2.5 mg x 1) all other days   Weekly warfarin total:   18.75 mg   Plan last modified:   Kamlesh Keller PharmD (8/16/2018)   Next INR check:   8/21/2018   Target end date:       Indications    Persistent atrial fibrillation (HCC) [I48.1]             Anticoagulation Episode Summary     INR check location:       Preferred lab:       Send INR reminders to:       Comments:         Anticoagulation Care Providers     Provider Role Specialty Phone number    Alonzo Flor M.D. Referring Cardiology [phone]    [facility] Responsible  [phone]        Anticoagulation Patient Findings        Spoke to patient on the phone.   INR  sub-therapeutic.   Denies signs/symptoms of bleeding and/or thrombosis.   Denies changes to diet or medications.   Follow up appointment in 1 week(s).    Increase weekly warfarin dose as noted      Kamlesh Keller, PharmD

## 2018-08-18 PROCEDURE — 665998 HH PPS REVENUE CREDIT

## 2018-08-18 PROCEDURE — 665999 HH PPS REVENUE DEBIT

## 2018-08-18 ASSESSMENT — ENCOUNTER SYMPTOMS: DIFFICULTY THINKING: 1

## 2018-08-19 PROCEDURE — 665998 HH PPS REVENUE CREDIT

## 2018-08-19 PROCEDURE — 665999 HH PPS REVENUE DEBIT

## 2018-08-20 PROCEDURE — 665999 HH PPS REVENUE DEBIT

## 2018-08-20 PROCEDURE — 665998 HH PPS REVENUE CREDIT

## 2018-08-21 ENCOUNTER — HOME CARE VISIT (OUTPATIENT)
Dept: HOME HEALTH SERVICES | Facility: HOME HEALTHCARE | Age: 83
End: 2018-08-21
Payer: MEDICARE

## 2018-08-21 ENCOUNTER — ANTICOAGULATION MONITORING (OUTPATIENT)
Dept: VASCULAR LAB | Facility: MEDICAL CENTER | Age: 83
End: 2018-08-21

## 2018-08-21 DIAGNOSIS — I48.19 PERSISTENT ATRIAL FIBRILLATION (HCC): ICD-10-CM

## 2018-08-21 LAB — INR PPP: 1.4 (ref 2–3.5)

## 2018-08-21 PROCEDURE — 665999 HH PPS REVENUE DEBIT

## 2018-08-21 PROCEDURE — G0300 HHS/HOSPICE OF LPN EA 15 MIN: HCPCS

## 2018-08-21 PROCEDURE — 665998 HH PPS REVENUE CREDIT

## 2018-08-21 NOTE — PROGRESS NOTES
Anticoagulation Summary  As of 8/21/2018    INR goal:   2.0-3.0   TTR:   0.0 % (1.3 wk)   Today's INR:   1.4!   Warfarin maintenance plan:   3.75 mg (2.5 mg x 1.5) on Mon, Wed, Fri; 2.5 mg (2.5 mg x 1) all other days   Weekly warfarin total:   21.25 mg   Plan last modified:   Mj Chin PharmD (8/21/2018)   Next INR check:   8/28/2018   Target end date:       Indications    Persistent atrial fibrillation (HCC) [I48.1]             Anticoagulation Episode Summary     INR check location:       Preferred lab:       Send INR reminders to:       Comments:   [facility]  Call Pt's daughter Enrique [phone]       Anticoagulation Care Providers     Provider Role Specialty Phone number    Alonzo Flor M.D. Referring Cardiology [phone]    [facility]ticoagulation [facility] Responsible  [phone]        Anticoagulation Patient Findings   HPI:  Arianne Irving, on anticoagulation therapy with warfarin for AF.  Changes to current medical/health status since last appt: none  Denies signs/symptoms of bleeding and/or thrombosis since the last appt.    Denies any interval changes to diet  Denies any interval changes to medications since last appt.   Denies any complications or cost restrictions with current therapy.     A/P   INR  SUB-therapeutic.   Begin increased regimen.     Next INR in 1 week(s).    Mj Chin, PharmD   CC [facility]

## 2018-08-22 PROCEDURE — 665999 HH PPS REVENUE DEBIT

## 2018-08-22 PROCEDURE — 665998 HH PPS REVENUE CREDIT

## 2018-08-23 VITALS
TEMPERATURE: 98.1 F | RESPIRATION RATE: 16 BRPM | OXYGEN SATURATION: 94 % | DIASTOLIC BLOOD PRESSURE: 76 MMHG | WEIGHT: 149.6 LBS | HEART RATE: 75 BPM | BODY MASS INDEX: 28.27 KG/M2 | SYSTOLIC BLOOD PRESSURE: 120 MMHG

## 2018-08-23 PROCEDURE — 665998 HH PPS REVENUE CREDIT

## 2018-08-23 PROCEDURE — 665999 HH PPS REVENUE DEBIT

## 2018-08-23 ASSESSMENT — ENCOUNTER SYMPTOMS
POOR JUDGMENT: 1
NAUSEA: DENIES
DIFFICULTY THINKING: 1
VOMITING: DENIES

## 2018-08-24 ENCOUNTER — HOME CARE VISIT (OUTPATIENT)
Dept: HOME HEALTH SERVICES | Facility: HOME HEALTHCARE | Age: 83
End: 2018-08-24
Payer: MEDICARE

## 2018-08-24 PROCEDURE — G0151 HHCP-SERV OF PT,EA 15 MIN: HCPCS

## 2018-08-24 PROCEDURE — 665998 HH PPS REVENUE CREDIT

## 2018-08-24 PROCEDURE — 665999 HH PPS REVENUE DEBIT

## 2018-08-25 VITALS
DIASTOLIC BLOOD PRESSURE: 70 MMHG | SYSTOLIC BLOOD PRESSURE: 108 MMHG | RESPIRATION RATE: 18 BRPM | TEMPERATURE: 98.4 F | HEART RATE: 86 BPM | OXYGEN SATURATION: 96 %

## 2018-08-25 PROCEDURE — 665998 HH PPS REVENUE CREDIT

## 2018-08-25 PROCEDURE — 665999 HH PPS REVENUE DEBIT

## 2018-08-25 ASSESSMENT — ENCOUNTER SYMPTOMS: DIFFICULTY THINKING: 1

## 2018-08-26 PROCEDURE — 665999 HH PPS REVENUE DEBIT

## 2018-08-26 PROCEDURE — 665998 HH PPS REVENUE CREDIT

## 2018-08-27 DIAGNOSIS — Z79.01 CHRONIC ANTICOAGULATION: ICD-10-CM

## 2018-08-27 PROCEDURE — 665999 HH PPS REVENUE DEBIT

## 2018-08-27 PROCEDURE — 665998 HH PPS REVENUE CREDIT

## 2018-08-27 RX ORDER — WARFARIN SODIUM 2.5 MG/1
TABLET ORAL
Qty: 135 TAB | Refills: 1 | Status: ON HOLD | OUTPATIENT
Start: 2018-08-27 | End: 2018-09-25 | Stop reason: CLARIF

## 2018-08-28 ENCOUNTER — HOME CARE VISIT (OUTPATIENT)
Dept: HOME HEALTH SERVICES | Facility: HOME HEALTHCARE | Age: 83
End: 2018-08-28
Payer: MEDICARE

## 2018-08-28 ENCOUNTER — ANTICOAGULATION MONITORING (OUTPATIENT)
Dept: VASCULAR LAB | Facility: MEDICAL CENTER | Age: 83
End: 2018-08-28

## 2018-08-28 VITALS
TEMPERATURE: 97.4 F | RESPIRATION RATE: 16 BRPM | DIASTOLIC BLOOD PRESSURE: 78 MMHG | SYSTOLIC BLOOD PRESSURE: 132 MMHG | BODY MASS INDEX: 27.96 KG/M2 | OXYGEN SATURATION: 96 % | HEART RATE: 70 BPM | WEIGHT: 148 LBS

## 2018-08-28 DIAGNOSIS — I48.19 PERSISTENT ATRIAL FIBRILLATION (HCC): ICD-10-CM

## 2018-08-28 LAB — INR PPP: 1.6 (ref 2–3.5)

## 2018-08-28 PROCEDURE — G0300 HHS/HOSPICE OF LPN EA 15 MIN: HCPCS

## 2018-08-28 PROCEDURE — 665998 HH PPS REVENUE CREDIT

## 2018-08-28 PROCEDURE — 665999 HH PPS REVENUE DEBIT

## 2018-08-28 ASSESSMENT — ENCOUNTER SYMPTOMS
DIFFICULTY THINKING: 1
NAUSEA: DENIES
VOMITING: DENIES
POOR JUDGMENT: 1

## 2018-08-29 PROCEDURE — 665999 HH PPS REVENUE DEBIT

## 2018-08-29 PROCEDURE — 665998 HH PPS REVENUE CREDIT

## 2018-08-29 NOTE — PROGRESS NOTES
Anticoagulation Summary  As of 8/28/2018    INR goal:   2.0-3.0   TTR:   0.0 % (2.3 wk)   Today's INR:   1.6!   Warfarin maintenance plan:   3.75 mg (2.5 mg x 1.5) on Mon, Wed, Fri; 2.5 mg (2.5 mg x 1) all other days   Weekly warfarin total:   21.25 mg   Plan last modified:   Mj Chin, PharmD (8/21/2018)   Next INR check:   9/6/2018   Target end date:       Indications    Persistent atrial fibrillation (HCC) [I48.1]             Anticoagulation Episode Summary     INR check location:       Preferred lab:       Send INR reminders to:       Comments:   [facility]  Call Pt's daughter Enrique [phone]       Anticoagulation Care Providers     Provider Role Specialty Phone number    Alonzo Flor M.D. Referring Cardiology [phone]    [facility] Anticoagulation Services Responsible  [phone]        Anticoagulation Patient Findings    Left voicemail message to report a subtherapeutic INR of 1.6.  Requested pt contact the clinic for any s/s of unusual bleeding, bruising, clotting or any changes to diet or medication.   Instructed patient to increase weekly warfarin regimen as detailed above.  FU 1 weeks.  Gomez Collier, RigoD

## 2018-08-30 ENCOUNTER — HOME CARE VISIT (OUTPATIENT)
Dept: HOME HEALTH SERVICES | Facility: HOME HEALTHCARE | Age: 83
End: 2018-08-30
Payer: MEDICARE

## 2018-08-30 PROCEDURE — G0151 HHCP-SERV OF PT,EA 15 MIN: HCPCS

## 2018-08-30 PROCEDURE — 665999 HH PPS REVENUE DEBIT

## 2018-08-30 PROCEDURE — 665998 HH PPS REVENUE CREDIT

## 2018-08-31 VITALS
OXYGEN SATURATION: 92 % | SYSTOLIC BLOOD PRESSURE: 130 MMHG | RESPIRATION RATE: 20 BRPM | HEART RATE: 86 BPM | TEMPERATURE: 98.5 F | DIASTOLIC BLOOD PRESSURE: 72 MMHG

## 2018-08-31 PROCEDURE — 665998 HH PPS REVENUE CREDIT

## 2018-08-31 PROCEDURE — 665999 HH PPS REVENUE DEBIT

## 2018-08-31 SDOH — ECONOMIC STABILITY: HOUSING INSECURITY
HOME SAFETY: PT LIVES WITH DAUGHTERS AND FAMILY AND THEY ARE THERE WITH HER 24/7.     THERE ARE 16 STEPS TO THE BEDROOM UPSTIARS.

## 2018-08-31 SDOH — ECONOMIC STABILITY: HOUSING INSECURITY: UNSAFE COOKING RANGE AREA: 0

## 2018-08-31 SDOH — ECONOMIC STABILITY: HOUSING INSECURITY: UNSAFE APPLIANCES: 0

## 2018-08-31 ASSESSMENT — BALANCE ASSESSMENTS
SITTING BALANCE: 1
TURNING 360 DEGREES STEPS: 1
IMMEDIATE STANDING BALANCE FIRST 5 SECONDS: 2
SURVEY COMPLETE: TRUE
TURNING 360 DEGREES STEADINESS: 1
STANDING BALANCE: 1
ARISES: 1
EYES CLOSED AT MAXIMUM POSITION NUDGED: 1
BALANCE SCORE: 13
ATTEMPTS TO ARISE: 2
SITTING DOWN: 1
NUDGED: 2

## 2018-08-31 ASSESSMENT — GAIT ASSESSMENTS
STEP SYMMETRY: 1
SURVEY COMPLETE: TRUE
LEFT STEP PASS: 1
GAIT SCORE: 10
RIGHT STEP CLEAR: 1
TRUNK: 1
BALANCE AND GAIT SCORE: 23
PATH: 1
LEFT STEP CLEAR: 1
STEP CONTINUITY: 1
RIGHT STEP PASS: 1
INITIATION OF GAIT IMMEDIATELY AFTER GO: 1
TIME: 1

## 2018-09-01 PROCEDURE — 665998 HH PPS REVENUE CREDIT

## 2018-09-01 PROCEDURE — 665999 HH PPS REVENUE DEBIT

## 2018-09-02 PROCEDURE — 665998 HH PPS REVENUE CREDIT

## 2018-09-02 PROCEDURE — 665999 HH PPS REVENUE DEBIT

## 2018-09-03 PROCEDURE — 665998 HH PPS REVENUE CREDIT

## 2018-09-03 PROCEDURE — 665999 HH PPS REVENUE DEBIT

## 2018-09-04 PROCEDURE — 665998 HH PPS REVENUE CREDIT

## 2018-09-04 PROCEDURE — 665999 HH PPS REVENUE DEBIT

## 2018-09-04 ASSESSMENT — ENCOUNTER SYMPTOMS
NAUSEA: DENIES
VOMITING: DENIES

## 2018-09-05 ENCOUNTER — HOSPITAL ENCOUNTER (OUTPATIENT)
Dept: LAB | Facility: MEDICAL CENTER | Age: 83
End: 2018-09-05
Attending: FAMILY MEDICINE
Payer: MEDICARE

## 2018-09-05 ENCOUNTER — HOME CARE VISIT (OUTPATIENT)
Dept: HOME HEALTH SERVICES | Facility: HOME HEALTHCARE | Age: 83
End: 2018-09-05
Payer: MEDICARE

## 2018-09-05 VITALS
WEIGHT: 150.63 LBS | BODY MASS INDEX: 28.46 KG/M2 | DIASTOLIC BLOOD PRESSURE: 72 MMHG | RESPIRATION RATE: 20 BRPM | TEMPERATURE: 97.9 F | SYSTOLIC BLOOD PRESSURE: 124 MMHG | OXYGEN SATURATION: 97 % | HEART RATE: 72 BPM

## 2018-09-05 DIAGNOSIS — I48.19 PERSISTENT ATRIAL FIBRILLATION (HCC): Primary | ICD-10-CM

## 2018-09-05 LAB
ALBUMIN SERPL BCP-MCNC: 4.2 G/DL (ref 3.2–4.9)
ALBUMIN/GLOB SERPL: 1.6 G/DL
ALP SERPL-CCNC: 74 U/L (ref 30–99)
ALT SERPL-CCNC: 19 U/L (ref 2–50)
ANION GAP SERPL CALC-SCNC: 10 MMOL/L (ref 0–11.9)
AST SERPL-CCNC: 18 U/L (ref 12–45)
BASOPHILS # BLD AUTO: 1 % (ref 0–1.8)
BASOPHILS # BLD: 0.07 K/UL (ref 0–0.12)
BILIRUB SERPL-MCNC: 0.7 MG/DL (ref 0.1–1.5)
BUN SERPL-MCNC: 40 MG/DL (ref 8–22)
CALCIUM SERPL-MCNC: 10.1 MG/DL (ref 8.5–10.5)
CHLORIDE SERPL-SCNC: 103 MMOL/L (ref 96–112)
CO2 SERPL-SCNC: 26 MMOL/L (ref 20–33)
CREAT SERPL-MCNC: 1.56 MG/DL (ref 0.5–1.4)
EOSINOPHIL # BLD AUTO: 0.09 K/UL (ref 0–0.51)
EOSINOPHIL NFR BLD: 1.2 % (ref 0–6.9)
ERYTHROCYTE [DISTWIDTH] IN BLOOD BY AUTOMATED COUNT: 43.7 FL (ref 35.9–50)
GLOBULIN SER CALC-MCNC: 2.7 G/DL (ref 1.9–3.5)
GLUCOSE SERPL-MCNC: 128 MG/DL (ref 65–99)
HCT VFR BLD AUTO: 40.8 % (ref 37–47)
HGB BLD-MCNC: 13.2 G/DL (ref 12–16)
IMM GRANULOCYTES # BLD AUTO: 0.02 K/UL (ref 0–0.11)
IMM GRANULOCYTES NFR BLD AUTO: 0.3 % (ref 0–0.9)
INR PPP: 1.8 (ref 2–3.5)
LYMPHOCYTES # BLD AUTO: 1.51 K/UL (ref 1–4.8)
LYMPHOCYTES NFR BLD: 20.6 % (ref 22–41)
MAGNESIUM SERPL-MCNC: 1.9 MG/DL (ref 1.5–2.5)
MCH RBC QN AUTO: 28.9 PG (ref 27–33)
MCHC RBC AUTO-ENTMCNC: 32.4 G/DL (ref 33.6–35)
MCV RBC AUTO: 89.3 FL (ref 81.4–97.8)
MONOCYTES # BLD AUTO: 0.47 K/UL (ref 0–0.85)
MONOCYTES NFR BLD AUTO: 6.4 % (ref 0–13.4)
NEUTROPHILS # BLD AUTO: 5.17 K/UL (ref 2–7.15)
NEUTROPHILS NFR BLD: 70.5 % (ref 44–72)
NRBC # BLD AUTO: 0 K/UL
NRBC BLD-RTO: 0 /100 WBC
PLATELET # BLD AUTO: 177 K/UL (ref 164–446)
PMV BLD AUTO: 11 FL (ref 9–12.9)
POTASSIUM SERPL-SCNC: 3.6 MMOL/L (ref 3.6–5.5)
PROT SERPL-MCNC: 6.9 G/DL (ref 6–8.2)
RBC # BLD AUTO: 4.57 M/UL (ref 4.2–5.4)
SODIUM SERPL-SCNC: 139 MMOL/L (ref 135–145)
WBC # BLD AUTO: 7.3 K/UL (ref 4.8–10.8)

## 2018-09-05 PROCEDURE — 665998 HH PPS REVENUE CREDIT

## 2018-09-05 PROCEDURE — 84443 ASSAY THYROID STIM HORMONE: CPT

## 2018-09-05 PROCEDURE — 36415 COLL VENOUS BLD VENIPUNCTURE: CPT

## 2018-09-05 PROCEDURE — 85025 COMPLETE CBC W/AUTO DIFF WBC: CPT

## 2018-09-05 PROCEDURE — 83735 ASSAY OF MAGNESIUM: CPT

## 2018-09-05 PROCEDURE — 80053 COMPREHEN METABOLIC PANEL: CPT

## 2018-09-05 PROCEDURE — G0493 RN CARE EA 15 MIN HH/HOSPICE: HCPCS

## 2018-09-05 PROCEDURE — 665999 HH PPS REVENUE DEBIT

## 2018-09-05 ASSESSMENT — ACTIVITIES OF DAILY LIVING (ADL)
HOME_HEALTH_OASIS: 01
TRANSPORTATION COMMENTS: REQUIRES ASSIST OF ONE OTHER PERSON TO LEAVE THE HOME
HOME_HEALTH_OASIS: 00
OASIS_M1830: 01

## 2018-09-05 NOTE — PROGRESS NOTES
Ordered INR from [facility] for 9/5/18 per pt's daughter's request.     Eleanor Ibarra, PharmD

## 2018-09-06 ENCOUNTER — HOME CARE VISIT (OUTPATIENT)
Dept: HOME HEALTH SERVICES | Facility: HOME HEALTHCARE | Age: 83
End: 2018-09-06
Payer: MEDICARE

## 2018-09-06 ENCOUNTER — ANTICOAGULATION MONITORING (OUTPATIENT)
Dept: VASCULAR LAB | Facility: MEDICAL CENTER | Age: 83
End: 2018-09-06

## 2018-09-06 DIAGNOSIS — I48.19 PERSISTENT ATRIAL FIBRILLATION (HCC): ICD-10-CM

## 2018-09-06 PROCEDURE — 665999 HH PPS REVENUE DEBIT

## 2018-09-06 PROCEDURE — 665998 HH PPS REVENUE CREDIT

## 2018-09-06 NOTE — PROGRESS NOTES
Anticoagulation Summary  As of 9/6/2018    INR goal:   2.0-3.0   TTR:   0.0 % (3.4 wk)   Today's INR:   1.8! (9/5/2018)   Warfarin maintenance plan:   3.75 mg (2.5 mg x 1.5) every day   Weekly warfarin total:   26.25 mg   Plan last modified:   Mj Chin PharmD (9/6/2018)   Next INR check:   9/14/2018   Target end date:       Indications    Persistent atrial fibrillation (HCC) [I48.1]             Anticoagulation Episode Summary     INR check location:       Preferred lab:       Send INR reminders to:       Comments:   Call Pt's daughter Enrique [phone]       Anticoagulation Care Providers     Provider Role Specialty Phone number    Alonzo Flor M.D. Referring Cardiology [phone]    [facility] Anticoagulation Services Responsible  [phone]        Anticoagulation Patient Findings    HPI:  Arianne Irving, on anticoagulation therapy with warfarin for AF.  Changes to current medical/health status since last appt: none  Denies signs/symptoms of bleeding and/or thrombosis since the last appt.    Denies any interval changes to diet  Denies any interval changes to medications since last appt.   Denies any complications or cost restrictions with current therapy.     A/P   INR  SUB-therapeutic.   Begin 10% increased regimen.     Next INR in 1 week(s).    Mj Chin, PharmD

## 2018-09-07 PROCEDURE — 665998 HH PPS REVENUE CREDIT

## 2018-09-07 PROCEDURE — 665999 HH PPS REVENUE DEBIT

## 2018-09-08 PROCEDURE — 665999 HH PPS REVENUE DEBIT

## 2018-09-08 PROCEDURE — 665998 HH PPS REVENUE CREDIT

## 2018-09-09 PROCEDURE — 665999 HH PPS REVENUE DEBIT

## 2018-09-09 PROCEDURE — 665998 HH PPS REVENUE CREDIT

## 2018-09-10 PROCEDURE — 665999 HH PPS REVENUE DEBIT

## 2018-09-10 PROCEDURE — 665998 HH PPS REVENUE CREDIT

## 2018-09-11 LAB — TEST NAME 95000: NORMAL

## 2018-09-11 PROCEDURE — 665999 HH PPS REVENUE DEBIT

## 2018-09-11 PROCEDURE — 665998 HH PPS REVENUE CREDIT

## 2018-09-12 PROCEDURE — 665999 HH PPS REVENUE DEBIT

## 2018-09-12 PROCEDURE — 665998 HH PPS REVENUE CREDIT

## 2018-09-13 PROCEDURE — 665999 HH PPS REVENUE DEBIT

## 2018-09-13 PROCEDURE — 665998 HH PPS REVENUE CREDIT

## 2018-09-14 ENCOUNTER — ANTICOAGULATION VISIT (OUTPATIENT)
Dept: MEDICAL GROUP | Facility: PHYSICIAN GROUP | Age: 83
End: 2018-09-14
Payer: MEDICARE

## 2018-09-14 DIAGNOSIS — I48.19 PERSISTENT ATRIAL FIBRILLATION (HCC): ICD-10-CM

## 2018-09-14 LAB — INR PPP: 2.4 (ref 2–3.5)

## 2018-09-14 PROCEDURE — 665998 HH PPS REVENUE CREDIT

## 2018-09-14 PROCEDURE — 665999 HH PPS REVENUE DEBIT

## 2018-09-14 PROCEDURE — 85610 PROTHROMBIN TIME: CPT | Performed by: FAMILY MEDICINE

## 2018-09-14 PROCEDURE — 99999 PR NO CHARGE: CPT | Performed by: FAMILY MEDICINE

## 2018-09-14 NOTE — PROGRESS NOTES
Anticoagulation Summary  As of 9/14/2018    INR goal:   2.0-3.0   TTR:   18.8 % (1.1 mo)   Today's INR:   2.4   Warfarin maintenance plan:   3.75 mg (2.5 mg x 1.5) every day   Weekly warfarin total:   26.25 mg   Plan last modified:   Mj Chin, PharmD (9/6/2018)   Next INR check:   9/21/2018   Target end date:       Indications    Persistent atrial fibrillation (HCC) [I48.1]             Anticoagulation Episode Summary     INR check location:       Preferred lab:       Send INR reminders to:       Comments:   Call Pt's daughter Enrique [phone]       Anticoagulation Care Providers     Provider Role Specialty Phone number    Alonzo Flor M.D. Referring Cardiology [phone]    [facility] Anticoagulation Services Responsible  [phone]        Anticoagulation Patient Findings  Patient Findings     Negatives:   Signs/symptoms of thrombosis, Signs/symptoms of bleeding, Laboratory test error suspected, Change in health, Change in alcohol use, Change in activity, Upcoming invasive procedure, Emergency department visit, Upcoming dental procedure, Missed doses, Extra doses, Change in medications, Change in diet/appetite, Hospital admission, Bruising, Other complaints        HPI:   Arianne Nowakcarolyn seen in clinic today, on anticoagulation therapy with warfarin for stroke prevention due to history of atrial fibrillation.    Patient's previous INR was subtherapeutic at 1.8 on 9-5-18, at which time patient was instructed to increase weekly warfarin regimen.  She returns to clinic today to recheck INR to ensure it is therapeutic and thus preventing possible clotting and/or bleeding/bruising complications.    CHADS-VASc = at least 4  (unadjusted ischemic stroke risk/year:  4.8%, which is moderate risk)    Does patient have any changes to current medical/health status since last appt (Y/N):  NO  Does patient have any signs/symptoms of bleeding and/or thrombosis since the last appt (Y/N):  NO  Does patient have any  "interval changes to diet or medications since last appt (Y/N):  NO  Are there any complications or cost restrictions with current therapy (Y/N):  NO      Vitals:  BP check declined, daughter was in hurry to get to work    Weight  declines   Height   5' 1\"     Asssessment:      INR therapeutic at 2.4, therefore decreasing patient's risk of stroke and/or bleeding complications.   Reason(s) for out of range INR today:  n/a      Plan:  Pt is to continue with current warfarin dosing regimen in order to maintain INR in therapeutic range.     Follow up:  Because warfarin is a high risk medication and current CHEST guidelines recommend regular monitoring intervals (few days up to 12 weeks), will have patient return to clinic in 1 weeks to recheck INR.    Gomez Collier, PharmD    "

## 2018-09-15 PROCEDURE — 665998 HH PPS REVENUE CREDIT

## 2018-09-15 PROCEDURE — 665999 HH PPS REVENUE DEBIT

## 2018-09-16 PROCEDURE — 665999 HH PPS REVENUE DEBIT

## 2018-09-16 PROCEDURE — 665998 HH PPS REVENUE CREDIT

## 2018-09-17 PROCEDURE — 665999 HH PPS REVENUE DEBIT

## 2018-09-17 PROCEDURE — 665998 HH PPS REVENUE CREDIT

## 2018-09-18 ENCOUNTER — APPOINTMENT (RX ONLY)
Dept: URBAN - METROPOLITAN AREA CLINIC 4 | Facility: CLINIC | Age: 83
Setting detail: DERMATOLOGY
End: 2018-09-18

## 2018-09-18 DIAGNOSIS — L82.1 OTHER SEBORRHEIC KERATOSIS: ICD-10-CM

## 2018-09-18 DIAGNOSIS — L82.0 INFLAMED SEBORRHEIC KERATOSIS: ICD-10-CM

## 2018-09-18 DIAGNOSIS — L81.4 OTHER MELANIN HYPERPIGMENTATION: ICD-10-CM

## 2018-09-18 PROCEDURE — 99213 OFFICE O/P EST LOW 20 MIN: CPT

## 2018-09-18 PROCEDURE — 665998 HH PPS REVENUE CREDIT

## 2018-09-18 PROCEDURE — 665999 HH PPS REVENUE DEBIT

## 2018-09-18 PROCEDURE — ? LIQUID NITROGEN (COSMETIC)

## 2018-09-18 ASSESSMENT — LOCATION DETAILED DESCRIPTION DERM
LOCATION DETAILED: LEFT INFERIOR CENTRAL MALAR CHEEK
LOCATION DETAILED: RIGHT PROXIMAL POSTERIOR UPPER ARM
LOCATION DETAILED: LEFT DISTAL POSTERIOR UPPER ARM
LOCATION DETAILED: RIGHT CENTRAL MALAR CHEEK
LOCATION DETAILED: LEFT PROXIMAL POSTERIOR UPPER ARM

## 2018-09-18 ASSESSMENT — LOCATION ZONE DERM
LOCATION ZONE: FACE
LOCATION ZONE: ARM

## 2018-09-18 ASSESSMENT — LOCATION SIMPLE DESCRIPTION DERM
LOCATION SIMPLE: RIGHT CHEEK
LOCATION SIMPLE: LEFT POSTERIOR UPPER ARM
LOCATION SIMPLE: LEFT CHEEK
LOCATION SIMPLE: RIGHT POSTERIOR UPPER ARM

## 2018-09-19 PROCEDURE — 665998 HH PPS REVENUE CREDIT

## 2018-09-19 PROCEDURE — 665999 HH PPS REVENUE DEBIT

## 2018-09-20 PROCEDURE — 665999 HH PPS REVENUE DEBIT

## 2018-09-20 PROCEDURE — 665998 HH PPS REVENUE CREDIT

## 2018-09-21 ENCOUNTER — ANTICOAGULATION VISIT (OUTPATIENT)
Dept: MEDICAL GROUP | Facility: PHYSICIAN GROUP | Age: 83
End: 2018-09-21
Payer: MEDICARE

## 2018-09-21 VITALS — SYSTOLIC BLOOD PRESSURE: 129 MMHG | DIASTOLIC BLOOD PRESSURE: 85 MMHG | HEART RATE: 86 BPM

## 2018-09-21 DIAGNOSIS — I48.19 PERSISTENT ATRIAL FIBRILLATION (HCC): ICD-10-CM

## 2018-09-21 LAB — INR PPP: 2.6 (ref 2–3.5)

## 2018-09-21 PROCEDURE — 665999 HH PPS REVENUE DEBIT

## 2018-09-21 PROCEDURE — 665998 HH PPS REVENUE CREDIT

## 2018-09-21 PROCEDURE — 85610 PROTHROMBIN TIME: CPT | Performed by: FAMILY MEDICINE

## 2018-09-21 PROCEDURE — 99999 PR NO CHARGE: CPT | Performed by: FAMILY MEDICINE

## 2018-09-21 NOTE — PROGRESS NOTES
Anticoagulation Summary  As of 9/21/2018    INR goal:   2.0-3.0   TTR:   32.8 % (1.3 mo)   Today's INR:   2.6   Warfarin maintenance plan:   3.75 mg (2.5 mg x 1.5) every day   Weekly warfarin total:   26.25 mg   Plan last modified:   Mj Chin, PharmD (9/6/2018)   Next INR check:   10/5/2018   Target end date:       Indications    Persistent atrial fibrillation (HCC) [I48.1]             Anticoagulation Episode Summary     INR check location:       Preferred lab:       Send INR reminders to:       Comments:   Call Pt's daughter Enrique [phone]       Anticoagulation Care Providers     Provider Role Specialty Phone number    Alonzo Flor M.D. Referring Cardiology [phone]    [facility] Responsible  [phone]        Anticoagulation Patient Findings  Patient Findings     Negatives:   Signs/symptoms of thrombosis, Signs/symptoms of bleeding, Laboratory test error suspected, Change in health, Change in alcohol use, Change in activity, Upcoming invasive procedure, Emergency department visit, Upcoming dental procedure, Missed doses, Extra doses, Change in medications, Change in diet/appetite, Hospital admission, Bruising, Other complaints        HPI:   Arianne Nowakjarochoaamir seen in clinic today, on anticoagulation therapy with warfarin for stroke prevention due to history of atrial fibrillation.    Patient's previous INR was therapeutic at 2.4 on 9-14-18, at which time patient was instructed to continue with current warfarin regimen.  She returns to clinic today to recheck INR to ensure it is therapeutic and thus preventing possible clotting and/or bleeding/bruising complications.    CHADS-VASc = at least 4  (unadjusted ischemic stroke risk/year:  4.8%, which is moderate risk)    Does patient have any changes to current medical/health status since last appt (Y/N):  NO  Does patient have any signs/symptoms of bleeding and/or thrombosis since the last appt (Y/N):  NO  Does patient have any  interval changes to diet or medications since last appt (Y/N):  NO  Are there any complications or cost restrictions with current therapy (Y/N):  NO      Vitals:  /85  HR 86      Asssessment:      INR remains therapeutic at 2.6, therefore decreasing patient's risk of stroke and/or bleeding complications.   Reason(s) for out of range INR today:  n/a      Plan:  Pt is to continue with current warfarin dosing regimen in order to maintain INR in therapeutic range.     Follow up:  Because warfarin is a high risk medication and current CHEST guidelines recommend regular monitoring intervals (few days up to 12 weeks), will have patient return to clinic in 2 weeks to recheck INR.    Gomez Collier, PharmD

## 2018-09-22 PROCEDURE — 665998 HH PPS REVENUE CREDIT

## 2018-09-22 PROCEDURE — 665999 HH PPS REVENUE DEBIT

## 2018-09-23 PROCEDURE — 665998 HH PPS REVENUE CREDIT

## 2018-09-23 PROCEDURE — 665999 HH PPS REVENUE DEBIT

## 2018-09-24 ENCOUNTER — HOSPITAL ENCOUNTER (INPATIENT)
Facility: MEDICAL CENTER | Age: 83
LOS: 2 days | DRG: 689 | End: 2018-09-27
Attending: EMERGENCY MEDICINE | Admitting: HOSPITALIST
Payer: MEDICARE

## 2018-09-24 ENCOUNTER — OFFICE VISIT (OUTPATIENT)
Dept: CARDIOLOGY | Facility: MEDICAL CENTER | Age: 83
End: 2018-09-24
Payer: MEDICARE

## 2018-09-24 VITALS
HEIGHT: 62 IN | DIASTOLIC BLOOD PRESSURE: 80 MMHG | BODY MASS INDEX: 27.68 KG/M2 | WEIGHT: 150.4 LBS | SYSTOLIC BLOOD PRESSURE: 128 MMHG | HEART RATE: 92 BPM | OXYGEN SATURATION: 94 %

## 2018-09-24 DIAGNOSIS — N39.0 URINARY TRACT INFECTION WITHOUT HEMATURIA, SITE UNSPECIFIED: ICD-10-CM

## 2018-09-24 DIAGNOSIS — I25.10 CORONARY ARTERIOSCLEROSIS: ICD-10-CM

## 2018-09-24 DIAGNOSIS — N39.0 ACUTE UTI: ICD-10-CM

## 2018-09-24 DIAGNOSIS — R60.0 LOCALIZED EDEMA: ICD-10-CM

## 2018-09-24 DIAGNOSIS — R06.02 SOB (SHORTNESS OF BREATH): ICD-10-CM

## 2018-09-24 DIAGNOSIS — F02.80 LATE ONSET ALZHEIMER'S DISEASE WITHOUT BEHAVIORAL DISTURBANCE (HCC): ICD-10-CM

## 2018-09-24 DIAGNOSIS — E78.2 MIXED HYPERLIPIDEMIA: ICD-10-CM

## 2018-09-24 DIAGNOSIS — I34.0 NON-RHEUMATIC MITRAL REGURGITATION: ICD-10-CM

## 2018-09-24 DIAGNOSIS — I48.19 PERSISTENT ATRIAL FIBRILLATION (HCC): ICD-10-CM

## 2018-09-24 DIAGNOSIS — I10 BENIGN ESSENTIAL HYPERTENSION: ICD-10-CM

## 2018-09-24 DIAGNOSIS — G30.1 LATE ONSET ALZHEIMER'S DISEASE WITHOUT BEHAVIORAL DISTURBANCE (HCC): ICD-10-CM

## 2018-09-24 DIAGNOSIS — Z95.5 STENTED CORONARY ARTERY: ICD-10-CM

## 2018-09-24 DIAGNOSIS — R79.89 ELEVATED TROPONIN: ICD-10-CM

## 2018-09-24 LAB — EKG IMPRESSION: NORMAL

## 2018-09-24 PROCEDURE — 93005 ELECTROCARDIOGRAM TRACING: CPT | Performed by: EMERGENCY MEDICINE

## 2018-09-24 PROCEDURE — 87086 URINE CULTURE/COLONY COUNT: CPT

## 2018-09-24 PROCEDURE — 99285 EMERGENCY DEPT VISIT HI MDM: CPT

## 2018-09-24 PROCEDURE — 665998 HH PPS REVENUE CREDIT

## 2018-09-24 PROCEDURE — 665999 HH PPS REVENUE DEBIT

## 2018-09-24 PROCEDURE — 99214 OFFICE O/P EST MOD 30 MIN: CPT | Performed by: INTERNAL MEDICINE

## 2018-09-24 PROCEDURE — 93005 ELECTROCARDIOGRAM TRACING: CPT

## 2018-09-24 RX ORDER — TORSEMIDE 10 MG/1
10 TABLET ORAL DAILY
Qty: 30 TAB | Refills: 11 | Status: ON HOLD | OUTPATIENT
Start: 2018-09-24 | End: 2018-09-27

## 2018-09-24 ASSESSMENT — ENCOUNTER SYMPTOMS
LOSS OF CONSCIOUSNESS: 0
STRIDOR: 0
FEVER: 0
NEUROLOGICAL NEGATIVE: 1
DIZZINESS: 0
HEMOPTYSIS: 0
SPUTUM PRODUCTION: 0
COUGH: 0
PALPITATIONS: 0
CHILLS: 0
BRUISES/BLEEDS EASILY: 0
SHORTNESS OF BREATH: 0
WEAKNESS: 0
ORTHOPNEA: 0
MUSCULOSKELETAL NEGATIVE: 1
GASTROINTESTINAL NEGATIVE: 1
RESPIRATORY NEGATIVE: 1
SORE THROAT: 0
EYES NEGATIVE: 1
WHEEZING: 0
CONSTITUTIONAL NEGATIVE: 1
CARDIOVASCULAR NEGATIVE: 1
CLAUDICATION: 0
PND: 0

## 2018-09-24 ASSESSMENT — PAIN DESCRIPTION - DESCRIPTORS: DESCRIPTORS: OTHER (COMMENT)

## 2018-09-24 NOTE — PROGRESS NOTES
Chief Complaint   Patient presents with   • Coronary Artery Disease     follow up       Subjective:   Arianne Irving is a 86 y.o. female who presents today as a follow-up for CAD hypertension hyperlipidemia.  Her nuclear stress test was unremarkable.  Her echocardiogram showed multiple valve issues with regurgitation.  Her blood pressures been controlled.  She did develop a cough and PND.  Her daughter says that at night after couple of hours she will wake up and have trouble sleeping and be painting significantly.  She is not currently taking a diuretic with the exception of the Hyzaar.      Past Medical History:   Diagnosis Date   • CAD (coronary artery disease) May 2002    PCI/stent (Penta 3.0 x 28mm) to the LAD.   • CHF (congestive heart failure) ([facility]) 7/30/2018   • Dementia    • Edema     • HLD (hyperlipidemia)    • HTN (hypertension)    • Obesity     • Psychiatric disorder     dementia   • Renal disorder     simple cist     Past Surgical History:   Procedure Laterality Date   • GYN SURGERY      hysterectomy   • HYSTERECTOMY, TOTAL ABDOMINAL     • KNEE ARTHROSCOPY     • ROTATOR CUFF REPAIR     • TONSILLECTOMY AND ADENOIDECTOMY       Family History   Problem Relation Age of Onset   • Heart Disease Brother    • Heart Disease Mother    • Heart Failure Mother    • Heart Attack Father      Social History     Social History   • Marital status:      Spouse name: N/A   • Number of children: N/A   • Years of education: N/A     Occupational History   • Not on file.     Social History Main Topics   • Smoking status: Former Smoker     Types: Cigarettes     Quit date: 1/1/1967   • Smokeless tobacco: Never Used   • Alcohol use No   • Drug use: No   • Sexual activity: Not on file     Other Topics Concern   • Not on file     Social History Narrative   • No narrative on file     Allergies   Allergen Reactions   • Nkda [No Known Drug Allergy]      Outpatient Encounter Prescriptions as of 9/24/2018   Medication Sig  Dispense Refill   • torsemide (DEMADEX) 10 MG tablet Take 1 Tab by mouth every day. 30 Tab 11   • warfarin (COUMADIN) 2.5 MG Tab Take 1 to 1.5 tablets by mouth daily as directed by [facility]. 135 Tab 1   • Magnesium Oxide (MAGOX 400 PO) Take 1 tablet by mouth every day.     • acetaminophen (TYLENOL) 500 MG Tab Take 500 mg by mouth every 6 hours as needed for Mild Pain.     • sertraline (ZOLOFT) 50 MG Tab Take 50 mg by mouth every bedtime.     • metoprolol SR (TOPROL XL) 25 MG TABLET SR 24 HR TAKE ONE TABLET BY MOUTH ONCE DAILY 90 Tab 3   • losartan-hydrochlorothiazide (HYZAAR) 100-12.5 MG per tablet TAKE ONE TABLET BY MOUTH ONCE DAILY 90 Tab 3   • potassium Chloride ER (K-TAB) 20 MEQ Tab CR tablet TAKE ONE TABLET BY MOUTH ONCE DAILY 90 Tab 3   • Nisoldipine 17 MG TABLET SR 24 HR TAKE 1 TABLET BY MOUTH EVERY DAY 90 Tab 3   • atorvastatin (LIPITOR) 20 MG Tab TAKE ONE TABLET BY MOUTH IN THE EVENING 90 Tab 3   • trazodone (DESYREL) 50 MG Tab Take 50 mg by mouth every bedtime.     • rivastigmine (EXELON) 9.5 MG/24HR patch Apply 1 Patch to skin as directed every day.     • nitroglycerin (NITROSTAT) 0.4 MG SUBL Place 1 Tab under tongue as needed for Chest Pain. 25 Tab 3   • esomeprazole (NEXIUM) 40 MG capsule Take 40 mg by mouth every morning before breakfast.     • [DISCONTINUED] Probiotic Product ([employer] PO) Take 1 Tab by mouth every day.     • [DISCONTINUED] Diclofenac Sodium (VOLTAREN) 1 % Gel Apply 1 Applicator to affected area(s) as needed (neck pain).     • [DISCONTINUED] amLODIPine (NORVASC) 2.5 MG Tab Take 2.5 mg by mouth every day.       No facility-administered encounter medications on file as of 9/24/2018.      Review of Systems   Constitutional: Negative.  Negative for chills, fever and malaise/fatigue.   HENT: Negative.  Negative for sore throat.    Eyes: Negative.    Respiratory: Negative.  Negative for cough, hemoptysis, sputum production, shortness of breath, wheezing and stridor.   "  Cardiovascular: Negative.  Negative for chest pain, palpitations, orthopnea, claudication, leg swelling and PND.   Gastrointestinal: Negative.    Genitourinary: Negative.    Musculoskeletal: Negative.    Skin: Negative.    Neurological: Negative.  Negative for dizziness, loss of consciousness and weakness.   Endo/Heme/Allergies: Negative.  Does not bruise/bleed easily.   All other systems reviewed and are negative.       Objective:   /80 (BP Location: Left arm, Patient Position: Sitting, BP Cuff Size: Adult)   Pulse 92   Ht 1.575 m (5' 2\")   Wt 68.2 kg (150 lb 6.4 oz)   SpO2 94%   BMI 27.51 kg/m²     Physical Exam   Constitutional: She appears well-developed and well-nourished. No distress.   HENT:   Head: Normocephalic and atraumatic.   Right Ear: External ear normal.   Left Ear: External ear normal.   Nose: Nose normal.   Mouth/Throat: No oropharyngeal exudate.   Eyes: Pupils are equal, round, and reactive to light. Conjunctivae and EOM are normal. Right eye exhibits no discharge. Left eye exhibits no discharge. No scleral icterus.   Neck: Neck supple. No JVD present.   Cardiovascular: Normal rate, regular rhythm and intact distal pulses.  Exam reveals no gallop and no friction rub.    No murmur heard.  Pulmonary/Chest: Effort normal. No stridor. No respiratory distress. She has no wheezes. She has no rales. She exhibits no tenderness.   Abdominal: Soft. She exhibits no distension. There is no guarding.   Musculoskeletal: Normal range of motion. She exhibits no edema, tenderness or deformity.   Neurological: She is alert. She has normal reflexes. She displays normal reflexes. No cranial nerve deficit. She exhibits normal muscle tone. Coordination normal.   Skin: Skin is warm and dry. No rash noted. She is not diaphoretic. No erythema. No pallor.   Psychiatric: She has a normal mood and affect. Her behavior is normal. Judgment and thought content normal.   Nursing note and vitals " reviewed.  Echo:  CONCLUSIONS  Normal left ventricular chamber size.  Mild concentric left ventricular hypertrophy.  Normal left ventricular systolic function.  Left ventricular ejection fraction is visually estimated to be 65%.  Moderate to severe mitral regurgitation.  Moderate aortic insufficiency.  Moderately dilated left atrium.  Trace tricuspid regurgitation.  Normal inferior vena cava size and inspiratory collapse.  Right ventricular systolic pressure is estimated to be 45 mmHg.  The right ventricle was normal in size and function.  Normal pericardium without effusion.    NM:     Myocardial Perfusion   Report   NUCLEAR IMAGING INTERPRETATION   No evidence of significant jeopardized viable myocardium or prior myocardial    infarction.   Normal left ventricular size, ejection fraction, and wall motion.   ECG INTERPRETATION   Negative stress ECG for ischemia.    CXR:  FINDINGS:  The cardiac silhouette is enlarged. There is bibasilar consolidation with pleural fluid. No pneumothorax.    Lab Results   Component Value Date/Time    CHOLSTRLTOT 277 (H) 02/15/2017 09:53 AM     (H) 02/15/2017 09:53 AM    HDL 63 02/15/2017 09:53 AM    TRIGLYCERIDE 152 (H) 02/15/2017 09:53 AM       Lab Results   Component Value Date/Time    SODIUM 139 09/05/2018 01:38 PM    POTASSIUM 3.6 09/05/2018 01:38 PM    CHLORIDE 103 09/05/2018 01:38 PM    CO2 26 09/05/2018 01:38 PM    GLUCOSE 128 (H) 09/05/2018 01:38 PM    BUN 40 (H) 09/05/2018 01:38 PM    CREATININE 1.56 (H) 09/05/2018 01:38 PM    CREATININE 0.9 11/14/2005 12:15 PM     Lab Results   Component Value Date/Time    ALKPHOSPHAT 74 09/05/2018 01:38 PM    ASTSGOT 18 09/05/2018 01:38 PM    ALTSGPT 19 09/05/2018 01:38 PM    TBILIRUBIN 0.7 09/05/2018 01:38 PM          Assessment:     1. Coronary arteriosclerosis     2. Benign essential hypertension     3. Localized edema     4. Late onset Alzheimer's disease without behavioral disturbance     5. Non-rheumatic mitral regurgitation   torsemide (DEMADEX) 10 MG tablet   6. Mixed hyperlipidemia     7. Persistent atrial fibrillation (HCC)  torsemide (DEMADEX) 10 MG tablet    BASIC METABOLIC PANEL   8. SOB (shortness of breath)  BASIC METABOLIC PANEL    BTYPE NATRIURETIC PEPTIDE   9. Stented coronary artery         Medical Decision Making:  Today's Assessment / Status / Plan:     86-year-old female with what sounds like PND and a cough with some fluid in her lungs seen on her chest x-ray a couple of months ago.  At this point we will start a very low-dose diuretic for her.  We will check her labs in 1 week.    Thank for you allowing me to take part in your patient's care, please call should you have any questions or would like to discuss this patient.

## 2018-09-25 ENCOUNTER — APPOINTMENT (OUTPATIENT)
Dept: RADIOLOGY | Facility: MEDICAL CENTER | Age: 83
DRG: 689 | End: 2018-09-25
Attending: EMERGENCY MEDICINE
Payer: MEDICARE

## 2018-09-25 PROBLEM — G93.40 ACUTE ENCEPHALOPATHY: Status: RESOLVED | Noted: 2018-09-25 | Resolved: 2018-09-25

## 2018-09-25 PROBLEM — N18.30 CKD (CHRONIC KIDNEY DISEASE) STAGE 3, GFR 30-59 ML/MIN: Status: ACTIVE | Noted: 2018-09-25

## 2018-09-25 PROBLEM — G93.40 ACUTE ENCEPHALOPATHY: Status: ACTIVE | Noted: 2018-09-25

## 2018-09-25 PROBLEM — N39.0 UTI (URINARY TRACT INFECTION): Status: ACTIVE | Noted: 2018-09-25

## 2018-09-25 LAB
ABO GROUP BLD: NORMAL
ABO GROUP BLD: NORMAL
ALBUMIN SERPL BCP-MCNC: 3.8 G/DL (ref 3.2–4.9)
ALBUMIN/GLOB SERPL: 1.2 G/DL
ALP SERPL-CCNC: 80 U/L (ref 30–99)
ALT SERPL-CCNC: 26 U/L (ref 2–50)
ANION GAP SERPL CALC-SCNC: 13 MMOL/L (ref 0–11.9)
APPEARANCE UR: ABNORMAL
APTT PPP: 36.7 SEC (ref 24.7–36)
AST SERPL-CCNC: 20 U/L (ref 12–45)
BACTERIA #/AREA URNS HPF: ABNORMAL /HPF
BASOPHILS # BLD AUTO: 0.5 % (ref 0–1.8)
BASOPHILS # BLD: 0.04 K/UL (ref 0–0.12)
BILIRUB SERPL-MCNC: 0.8 MG/DL (ref 0.1–1.5)
BILIRUB UR QL STRIP.AUTO: NEGATIVE
BLD GP AB SCN SERPL QL: NORMAL
BNP SERPL-MCNC: 466 PG/ML (ref 0–100)
BUN SERPL-MCNC: 38 MG/DL (ref 8–22)
CALCIUM SERPL-MCNC: 10.3 MG/DL (ref 8.5–10.5)
CHLORIDE SERPL-SCNC: 104 MMOL/L (ref 96–112)
CO2 SERPL-SCNC: 23 MMOL/L (ref 20–33)
COLOR UR: YELLOW
CREAT SERPL-MCNC: 1.46 MG/DL (ref 0.5–1.4)
EOSINOPHIL # BLD AUTO: 0.1 K/UL (ref 0–0.51)
EOSINOPHIL NFR BLD: 1.3 % (ref 0–6.9)
EPI CELLS #/AREA URNS HPF: ABNORMAL /HPF
ERYTHROCYTE [DISTWIDTH] IN BLOOD BY AUTOMATED COUNT: 44.9 FL (ref 35.9–50)
GLOBULIN SER CALC-MCNC: 3.1 G/DL (ref 1.9–3.5)
GLUCOSE SERPL-MCNC: 123 MG/DL (ref 65–99)
GLUCOSE UR STRIP.AUTO-MCNC: NEGATIVE MG/DL
HCT VFR BLD AUTO: 39.5 % (ref 37–47)
HGB BLD-MCNC: 12.7 G/DL (ref 12–16)
IMM GRANULOCYTES # BLD AUTO: 0.03 K/UL (ref 0–0.11)
IMM GRANULOCYTES NFR BLD AUTO: 0.4 % (ref 0–0.9)
INR PPP: 2.32 (ref 0.87–1.13)
KETONES UR STRIP.AUTO-MCNC: ABNORMAL MG/DL
LEUKOCYTE ESTERASE UR QL STRIP.AUTO: ABNORMAL
LIPASE SERPL-CCNC: 37 U/L (ref 11–82)
LYMPHOCYTES # BLD AUTO: 1.6 K/UL (ref 1–4.8)
LYMPHOCYTES NFR BLD: 20.5 % (ref 22–41)
MCH RBC QN AUTO: 28 PG (ref 27–33)
MCHC RBC AUTO-ENTMCNC: 32.2 G/DL (ref 33.6–35)
MCV RBC AUTO: 87.2 FL (ref 81.4–97.8)
MICRO URNS: ABNORMAL
MONOCYTES # BLD AUTO: 0.71 K/UL (ref 0–0.85)
MONOCYTES NFR BLD AUTO: 9.1 % (ref 0–13.4)
MUCOUS THREADS #/AREA URNS HPF: ABNORMAL /HPF
NEUTROPHILS # BLD AUTO: 5.31 K/UL (ref 2–7.15)
NEUTROPHILS NFR BLD: 68.2 % (ref 44–72)
NITRITE UR QL STRIP.AUTO: NEGATIVE
NRBC # BLD AUTO: 0 K/UL
NRBC BLD-RTO: 0 /100 WBC
PH UR STRIP.AUTO: 6 [PH]
PLATELET # BLD AUTO: 174 K/UL (ref 164–446)
PMV BLD AUTO: 10.6 FL (ref 9–12.9)
POTASSIUM SERPL-SCNC: 3.4 MMOL/L (ref 3.6–5.5)
PROT SERPL-MCNC: 6.9 G/DL (ref 6–8.2)
PROT UR QL STRIP: 30 MG/DL
PROTHROMBIN TIME: 25.6 SEC (ref 12–14.6)
RBC # BLD AUTO: 4.53 M/UL (ref 4.2–5.4)
RBC # URNS HPF: ABNORMAL /HPF
RBC UR QL AUTO: ABNORMAL
RH BLD: NORMAL
RH BLD: NORMAL
SODIUM SERPL-SCNC: 140 MMOL/L (ref 135–145)
SP GR UR STRIP.AUTO: 1.02
TRANS CELLS #/AREA URNS HPF: ABNORMAL /HPF
TROPONIN I SERPL-MCNC: 0.06 NG/ML (ref 0–0.04)
TSH SERPL DL<=0.005 MIU/L-ACNC: 4.16 UIU/ML (ref 0.38–5.33)
UROBILINOGEN UR STRIP.AUTO-MCNC: 0.2 MG/DL
WBC # BLD AUTO: 7.8 K/UL (ref 4.8–10.8)
WBC #/AREA URNS HPF: ABNORMAL /HPF

## 2018-09-25 PROCEDURE — 770001 HCHG ROOM/CARE - MED/SURG/GYN PRIV*

## 2018-09-25 PROCEDURE — 700102 HCHG RX REV CODE 250 W/ 637 OVERRIDE(OP): Performed by: INTERNAL MEDICINE

## 2018-09-25 PROCEDURE — 86901 BLOOD TYPING SEROLOGIC RH(D): CPT

## 2018-09-25 PROCEDURE — 84484 ASSAY OF TROPONIN QUANT: CPT

## 2018-09-25 PROCEDURE — A9270 NON-COVERED ITEM OR SERVICE: HCPCS | Performed by: INTERNAL MEDICINE

## 2018-09-25 PROCEDURE — 85025 COMPLETE CBC W/AUTO DIFF WBC: CPT

## 2018-09-25 PROCEDURE — 665998 HH PPS REVENUE CREDIT

## 2018-09-25 PROCEDURE — 700111 HCHG RX REV CODE 636 W/ 250 OVERRIDE (IP): Performed by: EMERGENCY MEDICINE

## 2018-09-25 PROCEDURE — 85730 THROMBOPLASTIN TIME PARTIAL: CPT

## 2018-09-25 PROCEDURE — 99223 1ST HOSP IP/OBS HIGH 75: CPT | Mod: AI | Performed by: HOSPITALIST

## 2018-09-25 PROCEDURE — 83690 ASSAY OF LIPASE: CPT

## 2018-09-25 PROCEDURE — 86900 BLOOD TYPING SEROLOGIC ABO: CPT

## 2018-09-25 PROCEDURE — 665999 HH PPS REVENUE DEBIT

## 2018-09-25 PROCEDURE — 36415 COLL VENOUS BLD VENIPUNCTURE: CPT

## 2018-09-25 PROCEDURE — 83880 ASSAY OF NATRIURETIC PEPTIDE: CPT

## 2018-09-25 PROCEDURE — 700102 HCHG RX REV CODE 250 W/ 637 OVERRIDE(OP): Performed by: HOSPITALIST

## 2018-09-25 PROCEDURE — 96365 THER/PROPH/DIAG IV INF INIT: CPT

## 2018-09-25 PROCEDURE — 80053 COMPREHEN METABOLIC PANEL: CPT

## 2018-09-25 PROCEDURE — 86850 RBC ANTIBODY SCREEN: CPT

## 2018-09-25 PROCEDURE — 84443 ASSAY THYROID STIM HORMONE: CPT

## 2018-09-25 PROCEDURE — 81001 URINALYSIS AUTO W/SCOPE: CPT

## 2018-09-25 PROCEDURE — A9270 NON-COVERED ITEM OR SERVICE: HCPCS | Performed by: HOSPITALIST

## 2018-09-25 PROCEDURE — 700105 HCHG RX REV CODE 258: Performed by: EMERGENCY MEDICINE

## 2018-09-25 PROCEDURE — 85610 PROTHROMBIN TIME: CPT

## 2018-09-25 PROCEDURE — 71045 X-RAY EXAM CHEST 1 VIEW: CPT

## 2018-09-25 RX ORDER — METOPROLOL SUCCINATE 25 MG/1
50 TABLET, EXTENDED RELEASE ORAL DAILY
COMMUNITY
End: 2018-10-22 | Stop reason: SDUPTHER

## 2018-09-25 RX ORDER — RIVASTIGMINE 9.5 MG/24H
1 PATCH, EXTENDED RELEASE TRANSDERMAL DAILY
Status: DISCONTINUED | OUTPATIENT
Start: 2018-09-25 | End: 2018-09-25

## 2018-09-25 RX ORDER — ACETAMINOPHEN 325 MG/1
650 TABLET ORAL EVERY 6 HOURS PRN
Status: DISCONTINUED | OUTPATIENT
Start: 2018-09-25 | End: 2018-09-27 | Stop reason: HOSPADM

## 2018-09-25 RX ORDER — ONDANSETRON 2 MG/ML
4 INJECTION INTRAMUSCULAR; INTRAVENOUS EVERY 4 HOURS PRN
Status: DISCONTINUED | OUTPATIENT
Start: 2018-09-25 | End: 2018-09-27 | Stop reason: HOSPADM

## 2018-09-25 RX ORDER — BISACODYL 10 MG
10 SUPPOSITORY, RECTAL RECTAL
Status: DISCONTINUED | OUTPATIENT
Start: 2018-09-25 | End: 2018-09-27 | Stop reason: HOSPADM

## 2018-09-25 RX ORDER — ONDANSETRON 4 MG/1
4 TABLET, ORALLY DISINTEGRATING ORAL EVERY 4 HOURS PRN
Status: DISCONTINUED | OUTPATIENT
Start: 2018-09-25 | End: 2018-09-27 | Stop reason: HOSPADM

## 2018-09-25 RX ORDER — POTASSIUM CHLORIDE 20 MEQ/1
20 TABLET, EXTENDED RELEASE ORAL DAILY
Status: DISCONTINUED | OUTPATIENT
Start: 2018-09-25 | End: 2018-09-26

## 2018-09-25 RX ORDER — LOSARTAN POTASSIUM AND HYDROCHLOROTHIAZIDE 12.5; 1 MG/1; MG/1
1 TABLET ORAL DAILY
Status: DISCONTINUED | OUTPATIENT
Start: 2018-09-25 | End: 2018-09-25

## 2018-09-25 RX ORDER — POLYETHYLENE GLYCOL 3350 17 G/17G
1 POWDER, FOR SOLUTION ORAL
Status: DISCONTINUED | OUTPATIENT
Start: 2018-09-25 | End: 2018-09-27 | Stop reason: HOSPADM

## 2018-09-25 RX ORDER — NISOLDIPINE 17 MG/1
1 TABLET, FILM COATED, EXTENDED RELEASE ORAL
Status: DISCONTINUED | OUTPATIENT
Start: 2018-09-25 | End: 2018-09-25

## 2018-09-25 RX ORDER — TORSEMIDE 5 MG/1
10 TABLET ORAL DAILY
Status: DISCONTINUED | OUTPATIENT
Start: 2018-09-25 | End: 2018-09-27 | Stop reason: HOSPADM

## 2018-09-25 RX ORDER — ATORVASTATIN CALCIUM 20 MG/1
20 TABLET, FILM COATED ORAL EVERY EVENING
Status: DISCONTINUED | OUTPATIENT
Start: 2018-09-25 | End: 2018-09-27 | Stop reason: HOSPADM

## 2018-09-25 RX ORDER — TRAZODONE HYDROCHLORIDE 50 MG/1
50 TABLET ORAL
Status: DISCONTINUED | OUTPATIENT
Start: 2018-09-25 | End: 2018-09-27 | Stop reason: HOSPADM

## 2018-09-25 RX ORDER — ESOMEPRAZOLE MAGNESIUM 40 MG/1
40 CAPSULE, DELAYED RELEASE ORAL
Status: DISCONTINUED | OUTPATIENT
Start: 2018-09-25 | End: 2018-09-25

## 2018-09-25 RX ORDER — NISOLDIPINE 8.5 MG/1
17 TABLET, FILM COATED, EXTENDED RELEASE ORAL
Status: DISCONTINUED | OUTPATIENT
Start: 2018-09-26 | End: 2018-09-27 | Stop reason: HOSPADM

## 2018-09-25 RX ORDER — RIVASTIGMINE TARTRATE 1.5 MG/1
4.5 CAPSULE ORAL 2 TIMES DAILY
Status: DISCONTINUED | OUTPATIENT
Start: 2018-09-25 | End: 2018-09-27 | Stop reason: HOSPADM

## 2018-09-25 RX ORDER — WARFARIN SODIUM 2.5 MG/1
2.5 TABLET ORAL
Status: DISCONTINUED | OUTPATIENT
Start: 2018-09-25 | End: 2018-09-25

## 2018-09-25 RX ORDER — WARFARIN SODIUM 7.5 MG/1
3.75 TABLET ORAL
Status: DISCONTINUED | OUTPATIENT
Start: 2018-09-25 | End: 2018-09-27 | Stop reason: HOSPADM

## 2018-09-25 RX ORDER — LOSARTAN POTASSIUM 50 MG/1
100 TABLET ORAL
Status: DISCONTINUED | OUTPATIENT
Start: 2018-09-25 | End: 2018-09-27 | Stop reason: HOSPADM

## 2018-09-25 RX ORDER — METOPROLOL SUCCINATE 25 MG/1
25 TABLET, EXTENDED RELEASE ORAL
Status: DISCONTINUED | OUTPATIENT
Start: 2018-09-25 | End: 2018-09-27 | Stop reason: HOSPADM

## 2018-09-25 RX ORDER — HYDROCHLOROTHIAZIDE 12.5 MG/1
12.5 TABLET ORAL
Status: DISCONTINUED | OUTPATIENT
Start: 2018-09-25 | End: 2018-09-27 | Stop reason: HOSPADM

## 2018-09-25 RX ORDER — AMOXICILLIN 250 MG
2 CAPSULE ORAL 2 TIMES DAILY
Status: DISCONTINUED | OUTPATIENT
Start: 2018-09-25 | End: 2018-09-27 | Stop reason: HOSPADM

## 2018-09-25 RX ORDER — OMEPRAZOLE 20 MG/1
20 CAPSULE, DELAYED RELEASE ORAL DAILY
Status: DISCONTINUED | OUTPATIENT
Start: 2018-09-25 | End: 2018-09-27 | Stop reason: HOSPADM

## 2018-09-25 RX ORDER — WARFARIN SODIUM 2.5 MG/1
3.75 TABLET ORAL DAILY
COMMUNITY
End: 2018-11-16

## 2018-09-25 RX ORDER — HEPARIN SODIUM 5000 [USP'U]/ML
5000 INJECTION, SOLUTION INTRAVENOUS; SUBCUTANEOUS EVERY 8 HOURS
Status: DISCONTINUED | OUTPATIENT
Start: 2018-09-25 | End: 2018-09-25

## 2018-09-25 RX ADMIN — TORSEMIDE 10 MG: 5 TABLET ORAL at 08:21

## 2018-09-25 RX ADMIN — MAGNESIUM HYDROXIDE 30 ML: 400 SUSPENSION ORAL at 05:32

## 2018-09-25 RX ADMIN — HYDROCHLOROTHIAZIDE 12.5 MG: 12.5 TABLET ORAL at 06:45

## 2018-09-25 RX ADMIN — SENNOSIDES AND DOCUSATE SODIUM 2 TABLET: 8.6; 5 TABLET ORAL at 06:46

## 2018-09-25 RX ADMIN — WARFARIN SODIUM 3.75 MG: 2.5 TABLET ORAL at 20:30

## 2018-09-25 RX ADMIN — RIVASTIGMINE TARTRATE 4.5 MG: 1.5 CAPSULE ORAL at 06:45

## 2018-09-25 RX ADMIN — RIVASTIGMINE TARTRATE 4.5 MG: 1.5 CAPSULE ORAL at 20:30

## 2018-09-25 RX ADMIN — POTASSIUM CHLORIDE 20 MEQ: 1500 TABLET, EXTENDED RELEASE ORAL at 08:23

## 2018-09-25 RX ADMIN — OMEPRAZOLE 20 MG: 20 CAPSULE, DELAYED RELEASE ORAL at 06:45

## 2018-09-25 RX ADMIN — TRAZODONE HYDROCHLORIDE 50 MG: 50 TABLET ORAL at 20:30

## 2018-09-25 RX ADMIN — METOPROLOL SUCCINATE 25 MG: 25 TABLET, EXTENDED RELEASE ORAL at 06:45

## 2018-09-25 RX ADMIN — LOSARTAN POTASSIUM 100 MG: 50 TABLET ORAL at 06:45

## 2018-09-25 RX ADMIN — SERTRALINE 50 MG: 50 TABLET, FILM COATED ORAL at 17:35

## 2018-09-25 RX ADMIN — CEFTRIAXONE SODIUM 1 G: 1 INJECTION, POWDER, FOR SOLUTION INTRAMUSCULAR; INTRAVENOUS at 02:12

## 2018-09-25 RX ADMIN — ATORVASTATIN CALCIUM 20 MG: 20 TABLET, FILM COATED ORAL at 17:29

## 2018-09-25 ASSESSMENT — LIFESTYLE VARIABLES
ALCOHOL_USE: NO
EVER_SMOKED: NEVER

## 2018-09-25 ASSESSMENT — COGNITIVE AND FUNCTIONAL STATUS - GENERAL
DRESSING REGULAR UPPER BODY CLOTHING: A LITTLE
SUGGESTED CMS G CODE MODIFIER DAILY ACTIVITY: CK
MOVING TO AND FROM BED TO CHAIR: A LITTLE
DAILY ACTIVITIY SCORE: 18
TOILETING: A LITTLE
PERSONAL GROOMING: A LITTLE
CLIMB 3 TO 5 STEPS WITH RAILING: A LITTLE
MOBILITY SCORE: 19
WALKING IN HOSPITAL ROOM: A LITTLE
HELP NEEDED FOR BATHING: A LITTLE
SUGGESTED CMS G CODE MODIFIER MOBILITY: CK
STANDING UP FROM CHAIR USING ARMS: A LITTLE
EATING MEALS: A LITTLE
DRESSING REGULAR LOWER BODY CLOTHING: A LITTLE
TURNING FROM BACK TO SIDE WHILE IN FLAT BAD: A LITTLE

## 2018-09-25 ASSESSMENT — CHA2DS2 SCORE
SEX: FEMALE
HYPERTENSION: YES
AGE 75 OR GREATER: YES
CHA2DS2 VASC SCORE: 5
VASCULAR DISEASE: NO
DIABETES: NO
PRIOR STROKE OR TIA OR THROMBOEMBOLISM: NO
CHF OR LEFT VENTRICULAR DYSFUNCTION: YES
AGE 65 TO 74: NO

## 2018-09-25 ASSESSMENT — PAIN SCALES - GENERAL
PAINLEVEL_OUTOF10: 0
PAINLEVEL_OUTOF10: 0

## 2018-09-25 NOTE — PROGRESS NOTES
Inpatient Anticoagulation Service Note  Date: 9/25/2018    Reason for Anticoagulation: Atrial Fibrillation   [id number] VASc Score: 5  Target INR: 2.0 to 3.0    Hemoglobin Value: 12.7  Hematocrit Value: 39.5  Lab Platelet Value: 174    INR from last 7 days     Date/Time INR Value    09/25/18 0036 (!)  2.32        Dose from last 7 days     Date/Time Dose (mg)    09/25/18 1000  3.75        Average Dose (mg):  (9/21 [facility]: 3.75 mg daily)  Significant Interactions: Proton Pump Inhibitor, Statin, Sertraline, Trazodone, Torsemide, HCTZ,      Antibiotics (Ceftriaxone 9/25--  Bridge Therapy: No    HPI: 85 yo, female, with history of A fib on warfarin therapy. [id number] VASc Score of 5. Presents to hospital with abdominal pain, treating for UTI. Hx of dementia, poor historian. Followed at [facility]- last seen 9/21 home dose 3.75 mg (2.5 mg x1.5 tablets) daily stabilized on this dose since beginning of September.    Inpatient Diet: Regular    A/P: INR today is therapeutic. No s/sx of bleeding/thrombosis. DDIs have been established prior to admit. Patient has been started on Ceftriaxone however minimal concern for DDI with this antibiotic. Patient did take her warfarin dose yesterday (9/24) prior to admission. Will resume patient's home regimen of warfarin 3.75 mg daily, repeat INR in AM. Pharmacist will continue to monitor daily and adjust dose accordingly.        Education Material Provided?: No (chronic warfarin/dementia)  Pharmacist suggested discharge dosing: continue home regimen of warfarin 3.75 mg PO daily, repeat INR in 2-3 days after discharge.    Carlita Chung, Pharm.D

## 2018-09-25 NOTE — ASSESSMENT & PLAN NOTE
Stable. Baseline, patient ambulates independently and can go to toilet by self, needs some help with preparing meals, very poor short term memory but recognizes family and can voice needs.

## 2018-09-25 NOTE — PROGRESS NOTES
[facility] Hospitalist Progress Note    Date of Service: 2018    Chief Complaint  86 y.o. female admitted 2018 with abdominal pain    Interval Problem Update  Feels fine today; no c/o of pain/discomfort; eating little as at baseline; poor short term memory so not reliable historian.    Consultants/Specialty  none    Disposition  Home when stable        Review of Systems   Unable to perform ROS: Dementia      Physical Exam  Laboratory/Imaging   Hemodynamics  Temp (24hrs), Av.4 °C (97.5 °F), Min:36.1 °C (97 °F), Max:36.7 °C (98 °F)   Temperature: 36.1 °C (97 °F)  Pulse  Av  Min: 82  Max: 96 Heart Rate (Monitored): 84  Blood Pressure : 159/90, NIBP: 146/88      Respiratory      Respiration: 17, Pulse Oximetry: 99 %             Fluids    Intake/Output Summary (Last 24 hours) at 18 0815  Last data filed at 18 0242   Gross per 24 hour   Intake              100 ml   Output                0 ml   Net              100 ml       Nutrition  Orders Placed This Encounter   Procedures   • Diet Order Regular     Standing Status:   Standing     Number of Occurrences:   1     Order Specific Question:   Diet:     Answer:   Regular [1]     Physical Exam   Constitutional: No distress.   HENT:   Head: Normocephalic.   Eyes: EOM are normal.   Neck: Neck supple.   Cardiovascular: Normal rate.    irreg   Pulmonary/Chest: Effort normal and breath sounds normal. No respiratory distress. She has no wheezes.   Abdominal: Soft. Bowel sounds are normal. She exhibits no distension. There is no tenderness.   Musculoskeletal: She exhibits no edema.   Neurological: She is alert. She exhibits normal muscle tone.   Impaired short term memory; pleasant and cooperative   Skin: Skin is warm.   Psychiatric: Her behavior is normal.       Recent Labs      18   0037   WBC  7.8   RBC  4.53   HEMOGLOBIN  12.7   HEMATOCRIT  39.5   MCV  87.2   MCH  28.0   MCHC  32.2*   RDW  44.9   PLATELETCT  174   MPV  10.6     Recent Labs       09/25/18   0037   SODIUM  140   POTASSIUM  3.4*   CHLORIDE  104   CO2  23   GLUCOSE  123*   BUN  38*   CREATININE  1.46*   CALCIUM  10.3     Recent Labs      09/25/18   0036   APTT  36.7*   INR  2.32*     Recent Labs      09/25/18   0016   BNPBTYPENAT  466*              Assessment/Plan     * UTI (urinary tract infection)   Assessment & Plan    Start Rocephin, follow-up culture results.  Clinically better today; no abdom pain.  Home soon if continue to improve and culture known.  Spoke with daughter at bedside.        Persistent atrial fibrillation (HCC)- (present on admission)   Assessment & Plan    With rate currently controlled, anticoagulated with Coumadin; monitor INR        Mixed hyperlipidemia- (present on admission)   Assessment & Plan    Continue statin therapy.        Benign essential hypertension- (present on admission)   Assessment & Plan    Controlled with current medication regimen; adjust as indicated        CKD (chronic kidney disease) stage 3, GFR 30-59 ml/min   Assessment & Plan    Stable; monitor; avoid nephrotoxic meds        Late onset Alzheimer's disease without behavioral disturbance- (present on admission)   Assessment & Plan    Stable. Baseline, patient ambulates independently and can go to toilet by self, needs some help with preparing meals, very poor short term memory but recognizes family and can voice needs.          Quality-Core Measures

## 2018-09-25 NOTE — ASSESSMENT & PLAN NOTE
Start Rocephin, follow-up culture results.  Clinically better today; no abdom pain.  Home soon if continue to improve and culture known.  Spoke with daughter at bedside.

## 2018-09-25 NOTE — PROGRESS NOTES
Into unit per fidelia accompanied by transport, and ambulated with 2 assist to the bed. With complaints of periods of shortness of breath. On O2 at 2LPM per nasal cannula; O2 sat at 97%. No complaints of chest pain at the moment.    2 Rn skin check completed with MARY JO Maldonado. Back of left ear and sacral area is pinkish but blanching. Noted bruises on bilateral forearms.    Hourly rounding in place. Clustered nursing interventions to promote rest. Instructed to call for assistance whenever needed.

## 2018-09-25 NOTE — H&P
Hospital Medicine History & Physical Note    Date of Service  9/25/2018    Primary Care Physician  Elli Dickerson M.D.    Consultants  None    Code Status  Full code-this will need to be discussed further with daughter who is power of     Chief Complaint  Abdominal pain    History of Presenting Illness  86 y.o. female with history of dementia without behavioral disturbance, coronary artery disease which is controlled, and dyslipidemia on statin therapy was in her usual state of health until the day prior to admission.  She was noted to have some abdominal discomfort.  She has a difficult time giving a history due to her dementia, did not have any nausea or vomiting did not have fever or chills and no dysuria.  At one point during the day, she was in the restroom, and emerged saying that she had had some rectal bleeding.  This prompted her family to bring her to the emergency department.  She can provide none of her history at this time due to her dementia.    Review of Systems  Review of Systems   Unable to perform ROS: Dementia       Past Medical History   has a past medical history of CAD (coronary artery disease) (May 2002); CHF (congestive heart failure) (HCC) (7/30/2018); Dementia; Edema ( ); HLD (hyperlipidemia); HTN (hypertension); Obesity ( ); Psychiatric disorder; Renal disorder; and UTI (urinary tract infection) (9/25/2018). She also has no past medical history of Asthma; Cancer (HCC); Chronic obstructive pulmonary disease (HCC); Diabetes (HCC); Liver disease; Seizure disorder (HCC); or Stroke (HCC).    Surgical History   has a past surgical history that includes hysterectomy, total abdominal; rotator cuff repair; tonsillectomy and adenoidectomy; knee arthroscopy; and gyn surgery.     Family History  family history includes Heart Attack in her father; Heart Disease in her brother and mother; Heart Failure in her mother.     Social History   reports that she quit smoking about 51 years ago. Her  smoking use included Cigarettes. She has never used smokeless tobacco. She reports that she does not drink alcohol or use drugs.    Allergies  Allergies   Allergen Reactions   • Nkda [No Known Drug Allergy]        Medications  Prior to Admission Medications   Prescriptions Last Dose Informant Patient Reported? Taking?   Magnesium Oxide (MAGOX 400 PO) Taking  Yes No   Sig: Take 1 tablet by mouth every day.   Nisoldipine 17 MG TABLET SR 24 HR Taking at am Family Member No No   Sig: TAKE 1 TABLET BY MOUTH EVERY DAY   acetaminophen (TYLENOL) 500 MG Tab Taking  Yes No   Sig: Take 500 mg by mouth every 6 hours as needed for Mild Pain.   atorvastatin (LIPITOR) 20 MG Tab Taking at pm Family Member No No   Sig: TAKE ONE TABLET BY MOUTH IN THE EVENING   esomeprazole (NEXIUM) 40 MG capsule Taking at am Family Member Yes No   Sig: Take 40 mg by mouth every morning before breakfast.   losartan-hydrochlorothiazide (HYZAAR) 100-12.5 MG per tablet Taking at am Family Member No No   Sig: TAKE ONE TABLET BY MOUTH ONCE DAILY   metoprolol SR (TOPROL XL) 25 MG TABLET SR 24 HR Taking at am Family Member No No   Sig: TAKE ONE TABLET BY MOUTH ONCE DAILY   nitroglycerin (NITROSTAT) 0.4 MG SUBL Taking at prn Family Member No No   Sig: Place 1 Tab under tongue as needed for Chest Pain.   potassium Chloride ER (K-TAB) 20 MEQ Tab CR tablet Taking at am Family Member No No   Sig: TAKE ONE TABLET BY MOUTH ONCE DAILY   rivastigmine (EXELON) 9.5 MG/24HR patch Taking at am Family Member Yes No   Sig: Apply 1 Patch to skin as directed every day.   sertraline (ZOLOFT) 50 MG Tab Taking at pm Family Member Yes No   Sig: Take 50 mg by mouth every bedtime.   torsemide (DEMADEX) 10 MG tablet   No No   Sig: Take 1 Tab by mouth every day.   trazodone (DESYREL) 50 MG Tab Taking at hs Family Member Yes No   Sig: Take 50 mg by mouth every bedtime.   warfarin (COUMADIN) 2.5 MG Tab Taking  No No   Sig: Take 1 to 1.5 tablets by mouth daily as directed by Coumadin  Clinic.      Facility-Administered Medications: None       Physical Exam  Temp:  [36.7 °C (98 °F)] 36.7 °C (98 °F)  Pulse:  [87-96] 91  Resp:  [18-31] 18  BP: (122)/(76) 122/76    Physical Exam   Constitutional: She appears well-developed and well-nourished. No distress.   HENT:   Head: Normocephalic and atraumatic.   Eyes: Pupils are equal, round, and reactive to light. Conjunctivae are normal.   Neck: Normal range of motion. Neck supple. No tracheal deviation present. No thyromegaly present.   Cardiovascular: Normal rate, regular rhythm and normal heart sounds.  Exam reveals no gallop and no friction rub.    No murmur heard.  Pulmonary/Chest: Effort normal and breath sounds normal. No respiratory distress. She has no wheezes. She has no rales.   Abdominal: Soft. Bowel sounds are normal. She exhibits no distension. There is no tenderness. There is no rebound.   Musculoskeletal: Normal range of motion. She exhibits no edema.   Lymphadenopathy:     She has no cervical adenopathy.   Neurological: No cranial nerve deficit.   Skin: Skin is warm and dry. She is not diaphoretic.   Psychiatric: She has a normal mood and affect.   Nursing note and vitals reviewed.      Laboratory:  Recent Labs      09/25/18 0037   WBC  7.8   RBC  4.53   HEMOGLOBIN  12.7   HEMATOCRIT  39.5   MCV  87.2   MCH  28.0   MCHC  32.2*   RDW  44.9   PLATELETCT  174   MPV  10.6     Recent Labs      09/25/18   0037   SODIUM  140   POTASSIUM  3.4*   CHLORIDE  104   CO2  23   GLUCOSE  123*   BUN  38*   CREATININE  1.46*   CALCIUM  10.3     Recent Labs      09/25/18   0037   ALTSGPT  26   ASTSGOT  20   ALKPHOSPHAT  80   TBILIRUBIN  0.8   LIPASE  37   GLUCOSE  123*     Recent Labs      09/25/18   0036   APTT  36.7*   INR  2.32*     Recent Labs      09/25/18   0016   BNPBTYPENAT  466*         Recent Labs      09/25/18   0016   TROPONINI  0.06*       Urinalysis:    Recent Labs      09/25/18   0102   SPECGRAVITY  1.025   GLUCOSEUR  Negative   KETONES   Trace*   NITRITE  Negative   LEUKESTERAS  Moderate*   WBCURINE  Packed*   RBCURINE  2-5*   BACTERIA  Many*   EPITHELCELL  Few        Imaging:  DX-CHEST-LIMITED (1 VIEW)   Final Result      1.  RIGHT basilar atelectasis and/or airspace disease, similar to the prior study   2.  Small RIGHT pleural effusion   3.  Persistently enlarged cardiac silhouette   4.  Atherosclerosis            Assessment/Plan:  I anticipate this patient will require at least two midnights for appropriate medical management, necessitating inpatient admission.    * UTI (urinary tract infection)   Assessment & Plan    Start Rocephin, follow-up culture results.        Persistent atrial fibrillation (HCC)- (present on admission)   Assessment & Plan    With rate currently controlled, anticoagulated with Coumadin        Mixed hyperlipidemia- (present on admission)   Assessment & Plan    Continue statin therapy.        Benign essential hypertension- (present on admission)   Assessment & Plan    Controlled with current medication regimen.        Acute encephalopathy   Assessment & Plan    Worsening of dementia in the setting of urinary tract infection.        Late onset Alzheimer's disease without behavioral disturbance- (present on admission)   Assessment & Plan    Stable.            VTE prophylaxis: SCD, lovenox

## 2018-09-25 NOTE — DISCHARGE PLANNING
Transitional Care Navigator:    Per chart review patient has been identified as a candidate for HH based on her medical history, limited mobility with nursing, and LACE+ of 76 indicating a high risk for readmission. Please consider a referral to HH prior to a DC of home.

## 2018-09-25 NOTE — PROGRESS NOTES
Med Rec complete per Pt and Pt's at bedside   Ok per Pt to discuss medications with visitor/s present    Allergies Reviewed  No ABX in the last 30 days    Pt takes WARFARIN 3.75 mg every day of the week

## 2018-09-25 NOTE — ED TRIAGE NOTES
Chief Complaint   Patient presents with   • Abdominal Pain   • Bloody Stools     Patient ambulatory to triage. Patient states that she had an episode of bloody stool today. Patient has been having abdominal discomfort for over a month. Patient has dementia, no exact details provided. Patient does take coumadin. EKG ordered. /76   Pulse 90   Temp 36.7 °C (98 °F)   Resp 18   Wt 68.5 kg (151 lb 0.2 oz)   SpO2 96%   BMI 27.62 kg/m²

## 2018-09-25 NOTE — ED NOTES
"Pt c/o abd discomfort. Pt describes it as a \" mild bloating\" Pt reports that she did have flatus when using the rest room.  "

## 2018-09-25 NOTE — ED NOTES
Pt ambulates with a slow steady gait with family back to [place] [address]. Pt, along with pts family report that she had a bowel movement with bright red blood in the toilet. Pt reports that she is currently on anticoagulation therapy with Coumadin. Pt denies any current pain. Pt placed on full cardiac monitor.

## 2018-09-25 NOTE — ED PROVIDER NOTES
"ED Provider Note    Scribed for Florian Arteaga M.D. by Alex Cox. 9/25/2018, 12:29 AM.    Primary care provider: Elli Dickerson M.D.  Means of arrival: Walk-in  History obtained from: Patient  History limited by: None    CHIEF COMPLAINT  Chief Complaint   Patient presents with   • Abdominal Pain   • Bloody Stools       HPI  Arianne Irving is a 86 y.o. female with a past medical history of atrial fibrillation on Coumadin, CAD, CHF, dementia, and renal disorder who presents to the Emergency Department with complaints of abdominal pain and bloody stools onset 6-12 hours ago. Per patient's daughter, the patient had a bowel movement and noticed that there was blood in her stool. She came to ask her for a pad as she thought she was having a period. She also explains that she has been experiencing diffuse abdominal \"discomfort\" over the last 2 weeks. She describes this abdominal discomfort as if she is \"bloated\" and admits that this has been intermittent but worsening since the onset. She reports associated worsening shortness of breath. She visited her cardiologist recently who felt that the abdominal bloating and shortness of breath was likely secondary to fluid overload and he prescribed torsemide which she has not started yet. She denies any recent chest pain, leg swelling, nausea, vomiting, diarrhea, or fever. She had her INR drawn last Friday which was 2.6.     REVIEW OF SYSTEMS  Pertinent positives include abdominal pain, shortness of breath, hematochezia. Pertinent negatives include no chest pain, leg swelling, nausea, vomiting, diarrhea, or fever. As above, all other systems reviewed and are negative.   See HPI for further details.     PAST MEDICAL HISTORY   has a past medical history of CAD (coronary artery disease) (May 2002); CHF (congestive heart failure) ([facility]) (7/30/2018); Dementia; Edema ( ); HLD (hyperlipidemia); HTN (hypertension); Obesity ( ); Psychiatric disorder; and Renal disorder.    SURGICAL " HISTORY   has a past surgical history that includes hysterectomy, total abdominal; rotator cuff repair; tonsillectomy and adenoidectomy; knee arthroscopy; and gyn surgery.    SOCIAL HISTORY  Social History   Substance Use Topics   • Smoking status: Former Smoker     Types: Cigarettes     Quit date: 1/1/1967   • Smokeless tobacco: Never Used   • Alcohol use No      History   Drug Use No       FAMILY HISTORY  Family History   Problem Relation Age of Onset   • Heart Disease Brother    • Heart Disease Mother    • Heart Failure Mother    • Heart Attack Father        CURRENT MEDICATIONS  Home Medications     Reviewed by Amara Tai R.N. (Registered Nurse) on 09/24/18 at 2214  Med List Status: Partial   Medication Last Dose Status   acetaminophen (TYLENOL) 500 MG Tab Taking Active   atorvastatin (LIPITOR) 20 MG Tab Taking Active   esomeprazole (NEXIUM) 40 MG capsule Taking Active   losartan-hydrochlorothiazide (HYZAAR) 100-12.5 MG per tablet Taking Active   Magnesium Oxide (MAGOX 400 PO) Taking Active   metoprolol SR (TOPROL XL) 25 MG TABLET SR 24 HR Taking Active   Nisoldipine 17 MG TABLET SR 24 HR Taking Active   nitroglycerin (NITROSTAT) 0.4 MG SUBL Taking Active   potassium Chloride ER (K-TAB) 20 MEQ Tab CR tablet Taking Active   rivastigmine (EXELON) 9.5 MG/24HR patch Taking Active   sertraline (ZOLOFT) 50 MG Tab Taking Active   torsemide (DEMADEX) 10 MG tablet  Active   trazodone (DESYREL) 50 MG Tab Taking Active   warfarin (COUMADIN) 2.5 MG Tab Taking Active                ALLERGIES  Allergies   Allergen Reactions   • Nkda [No Known Drug Allergy]        PHYSICAL EXAM  VITAL SIGNS: /76   Pulse 90   Temp 36.7 °C (98 °F)   Resp 18   Wt 68.5 kg (151 lb 0.2 oz)   SpO2 96%   BMI 27.62 kg/m²   Vitals reviewed.  Constitutional: Alert in no apparent distress.  HENT: No signs of trauma, Bilateral external ears normal, Nose normal.   Eyes: Pupils are equal and reactive, Conjunctiva normal, Non-icteric.   Neck:  Normal range of motion, No tenderness, Supple, No stridor.   Lymphatic: No lymphadenopathy noted.   Cardiovascular: Irregular rhythm with intermittent tachycardia, no murmurs.   Thorax & Lungs: Decreased breath sounds at the right base, No respiratory distress, No wheezing, No chest tenderness.   Abdomen: Bowel sounds normal, Soft, No tenderness, No peritoneal signs, No masses, No pulsatile masses.   Rectal: Several nonthrombosed external hemorrhoids. Brown, Hemoccult-negative stool in the rectal vault.  Skin: Warm, Dry, No erythema, No rash.   Back: No bony tenderness, No CVA tenderness.   Extremities: Intact distal pulses, No edema, No tenderness, No cyanosis  Musculoskeletal: Good range of motion in all major joints. No tenderness to palpation or major deformities noted.   Neurologic: Alert, Normal motor function, No focal deficits noted.   Psychiatric: Affect normal, Judgment normal, Mood normal.       DIAGNOSTIC STUDIES / PROCEDURES    LABS  Labs Reviewed   CBC WITH DIFFERENTIAL - Abnormal; Notable for the following:        Result Value    MCHC 32.2 (*)     Lymphocytes 20.50 (*)     All other components within normal limits   COMP METABOLIC PANEL - Abnormal; Notable for the following:     Potassium 3.4 (*)     Anion Gap 13.0 (*)     Glucose 123 (*)     Bun 38 (*)     Creatinine 1.46 (*)     All other components within normal limits   URINALYSIS,CULTURE IF INDICATED - Abnormal; Notable for the following:     Character Cloudy (*)     Ketones Trace (*)     Protein 30 (*)     Leukocyte Esterase Moderate (*)     Occult Blood Trace (*)     All other components within normal limits   PROTHROMBIN TIME - Abnormal; Notable for the following:     PT 25.6 (*)     INR 2.32 (*)     All other components within normal limits    Narrative:     Indicate which anticoagulants the patient is on:->COUMADIN   APTT - Abnormal; Notable for the following:     APTT 36.7 (*)     All other components within normal limits    Narrative:      Indicate which anticoagulants the patient is on:->COUMADIN   TROPONIN - Abnormal; Notable for the following:     Troponin I 0.06 (*)     All other components within normal limits   BTYPE NATRIURETIC PEPTIDE - Abnormal; Notable for the following:     B Natriuretic Peptide 466 (*)     All other components within normal limits   ESTIMATED GFR - Abnormal; Notable for the following:     GFR If  41 (*)     GFR If Non  34 (*)     All other components within normal limits   URINE MICROSCOPIC (W/UA) - Abnormal; Notable for the following:     WBC Packed (*)     RBC 2-5 (*)     Bacteria Many (*)     All other components within normal limits   LIPASE   COD (ADULT)   ABO AND RH CONFIRMATION   URINE CULTURE(NEW)      All labs reviewed by me.    EKG Interpretation:  Interpreted by me    12 Lead EKG interpreted by me to show:  Atrial fibrillation  Rate 95  Intervals: Normal  PVCs with isolated ST depression in lead V6  Left axis deviation.  Normal T waves  My impression of this EKG: Does not indicate ischemia or arrhythmia at this time. No STEMl.     RADIOLOGY  DX-CHEST-LIMITED (1 VIEW)   Final Result      1.  RIGHT basilar atelectasis and/or airspace disease, similar to the prior study   2.  Small RIGHT pleural effusion   3.  Persistently enlarged cardiac silhouette   4.  Atherosclerosis        The radiologist's interpretation of all radiological studies have been reviewed by me.    COURSE & MEDICAL DECISION MAKING  Nursing notes, VS, PMSFHx reviewed in chart.  Differential diagnoses include but not limited to: Diverticulosis, mesenteric ischemia, lower GI bleed, brisk upper GI bleed, hemorrhoids, urinary tract infection, neoplasm, kidney stone     12:29 AM Patient seen and examined at bedside. Patient arrives intermittently tachycardic but afebrile with otherwise normal vital signs. Patient appears well hydrated and non-toxic. The physical exam is remarkable for intermittent tachycardia with an  irregular rhythm. She has no abdominal tenderness to palpation, no rigidity, no peritoneal signs. Her rectal exam reveals some nonthrombosed external hemorrhoids with light brown, Hemoccult negative stool in the rectal vault. Ordered for PT/INR, PTT, COD, CBC with differential, CMP, lipase, ABO and RH confirmation urinalysis, EKG to evaluate.      12:37 AM - Rectal exam was performed at this time.     Labs without leukocytosis or anemia. There are multiple small abnormalities on her metabolic panel including mild hypokalemia at 3.4, an elevated anion gap of 13, elevated blood glucose to 123, elevated BUN/creatinine to 38/1.46. Troponin is elevated to 0.06 and BNP is also slightly elevated to 466. On review of records, it appears the patient has a baseline creatinine between 1.3 and 1.6 but her troponin has always been within normal limits.    1:02 AM - Ordered urine microscopic.    Urinalysis suggests infection with moderate leukocyte esterase, trace ketones, trace blood, packed white blood cells, and many bacteria. Her symptoms are likely secondary to a urinary tract infection. Regarding the elevated troponin, I will defer aspirin treatment she is currently anticoagulated. I feel it is more appropriate to trend her troponin. Her EKG does not suggest ischemic changes. She denies any chest pain.    2:04 AM - Patient will be treated with 1 g Rocephin in  mL IVPB for her urinary tract infection.    Chest x-ray reveals right basilar atelectasis and small right pleural effusion.    She will require admission to the hospital for trending of her troponin and continued IV antibiotics for her urinary tract infection.    2:52 AM Paged hospitalist.     Discussed the patient with the on-call hospitalist, Dr. Liu. He will admit the patient to his service. The patient was admitted in guarded condition.    FINAL IMPRESSION  1. Acute UTI    2. Elevated troponin          I, Alex Cox (Viv), am scribing for, and in the  presence of, Florian Arteaga M.D..    Electronically signed by: Alex Cox (Scribe), 9/25/2018    I, Florian Arteaga M.D. personally performed the services described in this documentation, as scribed by Alex Cox in my presence, and it is both accurate and complete. C.     The note accurately reflects work and decisions made by me.  Florian Arteaga  9/25/2018  5:10 AM

## 2018-09-25 NOTE — ED NOTES
[facility] Fall Risk Assessment Tool     Present to ED because of fall  no  (Syncope, seizure, or ALOC)  Age>70   yes    Altered Mental Status:  (Intoxicated with Alcohol or Substance Confusion,  Inability to follow instructions, disorientation) yes    Impaired Mobility:  Ambulates or transfers with assistive devices or assist  Ambulates with unsteady gait and no assistance  Unable to ambulate or transfer   no    Nursing Judgement  (Bowel or bladder incontinence, diarrhea, urinary   frequency or urgency, leg weakness, orthostatic   hypotension, dizziness or vertigo, narcotic use).   no     Fall Risk Interventions  -Move the patient closer to the nurse's station  -Familiarize the patient with environment.  -Yellow non-slip socks applied  -Yellow FALL bracelet applied  -Place call light within reach and demonstrate call light use.  -Place stretcher in low position and brakes locked  -Place green triangle on patient's door  -Give patient urinal if applicable  -Keep floor surfaces clean and dry.  -Keep patient care areas uncluttered.  -Assess patient hourly for: Pain, Personal Needs, -Position change, and call light access

## 2018-09-26 PROBLEM — R53.81 DEBILITY: Status: ACTIVE | Noted: 2018-09-26

## 2018-09-26 LAB
ANION GAP SERPL CALC-SCNC: 11 MMOL/L (ref 0–11.9)
BASOPHILS # BLD AUTO: 0.8 % (ref 0–1.8)
BASOPHILS # BLD: 0.05 K/UL (ref 0–0.12)
BUN SERPL-MCNC: 35 MG/DL (ref 8–22)
CALCIUM SERPL-MCNC: 9.5 MG/DL (ref 8.5–10.5)
CHLORIDE SERPL-SCNC: 102 MMOL/L (ref 96–112)
CO2 SERPL-SCNC: 25 MMOL/L (ref 20–33)
CREAT SERPL-MCNC: 1.42 MG/DL (ref 0.5–1.4)
EOSINOPHIL # BLD AUTO: 0.13 K/UL (ref 0–0.51)
EOSINOPHIL NFR BLD: 2 % (ref 0–6.9)
ERYTHROCYTE [DISTWIDTH] IN BLOOD BY AUTOMATED COUNT: 45.1 FL (ref 35.9–50)
GLUCOSE SERPL-MCNC: 124 MG/DL (ref 65–99)
HCT VFR BLD AUTO: 39.1 % (ref 37–47)
HGB BLD-MCNC: 12.8 G/DL (ref 12–16)
IMM GRANULOCYTES # BLD AUTO: 0.03 K/UL (ref 0–0.11)
IMM GRANULOCYTES NFR BLD AUTO: 0.5 % (ref 0–0.9)
INR PPP: 2.32 (ref 0.87–1.13)
LYMPHOCYTES # BLD AUTO: 1.12 K/UL (ref 1–4.8)
LYMPHOCYTES NFR BLD: 16.9 % (ref 22–41)
MAGNESIUM SERPL-MCNC: 1.9 MG/DL (ref 1.5–2.5)
MCH RBC QN AUTO: 28.8 PG (ref 27–33)
MCHC RBC AUTO-ENTMCNC: 32.7 G/DL (ref 33.6–35)
MCV RBC AUTO: 87.9 FL (ref 81.4–97.8)
MONOCYTES # BLD AUTO: 0.47 K/UL (ref 0–0.85)
MONOCYTES NFR BLD AUTO: 7.1 % (ref 0–13.4)
NEUTROPHILS # BLD AUTO: 4.81 K/UL (ref 2–7.15)
NEUTROPHILS NFR BLD: 72.7 % (ref 44–72)
NRBC # BLD AUTO: 0 K/UL
NRBC BLD-RTO: 0 /100 WBC
PLATELET # BLD AUTO: 153 K/UL (ref 164–446)
PMV BLD AUTO: 10.6 FL (ref 9–12.9)
POTASSIUM SERPL-SCNC: 2.9 MMOL/L (ref 3.6–5.5)
PROTHROMBIN TIME: 25.5 SEC (ref 12–14.6)
RBC # BLD AUTO: 4.45 M/UL (ref 4.2–5.4)
SODIUM SERPL-SCNC: 138 MMOL/L (ref 135–145)
TROPONIN I SERPL-MCNC: 0.04 NG/ML (ref 0–0.04)
WBC # BLD AUTO: 6.6 K/UL (ref 4.8–10.8)

## 2018-09-26 PROCEDURE — 36415 COLL VENOUS BLD VENIPUNCTURE: CPT

## 2018-09-26 PROCEDURE — 80048 BASIC METABOLIC PNL TOTAL CA: CPT

## 2018-09-26 PROCEDURE — 700105 HCHG RX REV CODE 258

## 2018-09-26 PROCEDURE — 85610 PROTHROMBIN TIME: CPT

## 2018-09-26 PROCEDURE — 665998 HH PPS REVENUE CREDIT

## 2018-09-26 PROCEDURE — 83735 ASSAY OF MAGNESIUM: CPT

## 2018-09-26 PROCEDURE — 700111 HCHG RX REV CODE 636 W/ 250 OVERRIDE (IP): Performed by: HOSPITALIST

## 2018-09-26 PROCEDURE — A9270 NON-COVERED ITEM OR SERVICE: HCPCS | Performed by: HOSPITALIST

## 2018-09-26 PROCEDURE — 700105 HCHG RX REV CODE 258: Performed by: HOSPITALIST

## 2018-09-26 PROCEDURE — 99232 SBSQ HOSP IP/OBS MODERATE 35: CPT | Performed by: INTERNAL MEDICINE

## 2018-09-26 PROCEDURE — 84484 ASSAY OF TROPONIN QUANT: CPT

## 2018-09-26 PROCEDURE — A9270 NON-COVERED ITEM OR SERVICE: HCPCS | Performed by: INTERNAL MEDICINE

## 2018-09-26 PROCEDURE — 700111 HCHG RX REV CODE 636 W/ 250 OVERRIDE (IP): Performed by: INTERNAL MEDICINE

## 2018-09-26 PROCEDURE — 700102 HCHG RX REV CODE 250 W/ 637 OVERRIDE(OP): Performed by: HOSPITALIST

## 2018-09-26 PROCEDURE — 700102 HCHG RX REV CODE 250 W/ 637 OVERRIDE(OP): Performed by: INTERNAL MEDICINE

## 2018-09-26 PROCEDURE — 85025 COMPLETE CBC W/AUTO DIFF WBC: CPT

## 2018-09-26 PROCEDURE — 770001 HCHG ROOM/CARE - MED/SURG/GYN PRIV*

## 2018-09-26 PROCEDURE — 665999 HH PPS REVENUE DEBIT

## 2018-09-26 RX ORDER — POTASSIUM CHLORIDE 7.45 MG/ML
10 INJECTION INTRAVENOUS ONCE
Status: COMPLETED | OUTPATIENT
Start: 2018-09-26 | End: 2018-09-26

## 2018-09-26 RX ORDER — POTASSIUM CHLORIDE 20 MEQ/1
40 TABLET, EXTENDED RELEASE ORAL 2 TIMES DAILY
Status: DISCONTINUED | OUTPATIENT
Start: 2018-09-26 | End: 2018-09-27 | Stop reason: HOSPADM

## 2018-09-26 RX ORDER — SODIUM CHLORIDE 9 MG/ML
INJECTION, SOLUTION INTRAVENOUS
Status: COMPLETED
Start: 2018-09-26 | End: 2018-09-26

## 2018-09-26 RX ADMIN — POTASSIUM CHLORIDE 40 MEQ: 1500 TABLET, EXTENDED RELEASE ORAL at 18:00

## 2018-09-26 RX ADMIN — WARFARIN SODIUM 3.75 MG: 2.5 TABLET ORAL at 18:00

## 2018-09-26 RX ADMIN — OMEPRAZOLE 20 MG: 20 CAPSULE, DELAYED RELEASE ORAL at 06:48

## 2018-09-26 RX ADMIN — POTASSIUM CHLORIDE 10 MEQ: 7.46 INJECTION, SOLUTION INTRAVENOUS at 10:00

## 2018-09-26 RX ADMIN — RIVASTIGMINE TARTRATE 4.5 MG: 1.5 CAPSULE ORAL at 20:22

## 2018-09-26 RX ADMIN — LOSARTAN POTASSIUM 100 MG: 50 TABLET ORAL at 06:47

## 2018-09-26 RX ADMIN — CEFTRIAXONE SODIUM 2 G: 2 INJECTION, POWDER, FOR SOLUTION INTRAMUSCULAR; INTRAVENOUS at 06:43

## 2018-09-26 RX ADMIN — TORSEMIDE 10 MG: 5 TABLET ORAL at 07:08

## 2018-09-26 RX ADMIN — RIVASTIGMINE TARTRATE 4.5 MG: 1.5 CAPSULE ORAL at 06:42

## 2018-09-26 RX ADMIN — METOPROLOL SUCCINATE 25 MG: 25 TABLET, EXTENDED RELEASE ORAL at 06:47

## 2018-09-26 RX ADMIN — SODIUM CHLORIDE 500 ML: 9 INJECTION, SOLUTION INTRAVENOUS at 06:44

## 2018-09-26 RX ADMIN — TRAZODONE HYDROCHLORIDE 50 MG: 50 TABLET ORAL at 20:22

## 2018-09-26 RX ADMIN — SERTRALINE 50 MG: 50 TABLET, FILM COATED ORAL at 18:00

## 2018-09-26 RX ADMIN — ATORVASTATIN CALCIUM 20 MG: 20 TABLET, FILM COATED ORAL at 18:00

## 2018-09-26 RX ADMIN — HYDROCHLOROTHIAZIDE 12.5 MG: 12.5 TABLET ORAL at 06:47

## 2018-09-26 RX ADMIN — NISOLDIPINE 17 MG: 8.5 TABLET, FILM COATED, EXTENDED RELEASE ORAL at 07:08

## 2018-09-26 RX ADMIN — POTASSIUM CHLORIDE 20 MEQ: 1500 TABLET, EXTENDED RELEASE ORAL at 06:48

## 2018-09-26 ASSESSMENT — PATIENT HEALTH QUESTIONNAIRE - PHQ9
SUM OF ALL RESPONSES TO PHQ9 QUESTIONS 1 AND 2: 0
1. LITTLE INTEREST OR PLEASURE IN DOING THINGS: NOT AT ALL
2. FEELING DOWN, DEPRESSED, IRRITABLE, OR HOPELESS: NOT AT ALL

## 2018-09-26 ASSESSMENT — PAIN SCALES - GENERAL
PAINLEVEL_OUTOF10: 0
PAINLEVEL_OUTOF10: 0

## 2018-09-26 NOTE — CARE PLAN
Problem: Venous Thromboembolism (VTW)/Deep Vein Thrombosis (DVT) Prevention:  Goal: Patient will participate in Venous Thrombosis (VTE)/Deep Vein Thrombosis (DVT)Prevention Measures  Outcome: PROGRESSING AS EXPECTED  Pt is on warfarin and walking frequently     Problem: Urinary Elimination:  Goal: Ability to reestablish a normal urinary elimination pattern will improve  Outcome: PROGRESSING AS EXPECTED  Pt is up with stand by assist and steady to the restroom. frequency and urgency are both issues. Family is at bedside to help

## 2018-09-26 NOTE — PROGRESS NOTES
[facility] Hospitalist Progress Note    Date of Service: 2018    Chief Complaint  86 y.o. female admitted 2018 with abdominal pain    Interval Problem Update  Feels ok; no c/o; weaker than baseline per daughter;    Consultants/Specialty  none    Disposition  Home when stable        Review of Systems   Unable to perform ROS: Dementia      Physical Exam  Laboratory/Imaging   Hemodynamics  Temp (24hrs), Av.8 °C (98.3 °F), Min:36.6 °C (97.8 °F), Max:37 °C (98.6 °F)   Temperature: 36.6 °C (97.8 °F)  Pulse  Av  Min: 77  Max: 96    Blood Pressure : 141/92      Respiratory      Respiration: 16, Pulse Oximetry: 94 %        RUL Breath Sounds: Clear, RML Breath Sounds: Clear, RLL Breath Sounds: Diminished, TANA Breath Sounds: Clear, LLL Breath Sounds: Diminished    Fluids    Intake/Output Summary (Last 24 hours) at 18 0818  Last data filed at 18   Gross per 24 hour   Intake              120 ml   Output                0 ml   Net              120 ml       Nutrition  Orders Placed This Encounter   Procedures   • Diet Order Regular     Standing Status:   Standing     Number of Occurrences:   1     Order Specific Question:   Diet:     Answer:   Regular [1]     Physical Exam   Constitutional: No distress.   HENT:   Head: Normocephalic.   Eyes: EOM are normal.   Neck: Neck supple.   Cardiovascular: Normal rate.    irreg   Pulmonary/Chest: Effort normal and breath sounds normal. No respiratory distress. She has no wheezes.   Abdominal: Soft. Bowel sounds are normal. She exhibits no distension. There is no tenderness.   Musculoskeletal: She exhibits no edema.   Neurological: She is alert. She exhibits normal muscle tone.   Impaired short term memory; pleasant and cooperative   Skin: Skin is warm.   Psychiatric: Her behavior is normal.       Recent Labs      18   0037  18   0515   WBC  7.8  6.6   RBC  4.53  4.45   HEMOGLOBIN  12.7  12.8   HEMATOCRIT  39.5  39.1   MCV  87.2  87.9   MCH  28.0   28.8   MCHC  32.2*  32.7*   RDW  44.9  45.1   PLATELETCT  174  153*   MPV  10.6  10.6     Recent Labs      09/25/18   0037  09/26/18   0515   SODIUM  140  138   POTASSIUM  3.4*  2.9*   CHLORIDE  104  102   CO2  23  25   GLUCOSE  123*  124*   BUN  38*  35*   CREATININE  1.46*  1.42*   CALCIUM  10.3  9.5     Recent Labs      09/25/18   0036  09/26/18   0515   APTT  36.7*   --    INR  2.32*  2.32*     Recent Labs      09/25/18   0016   BNPBTYPENAT  466*              Assessment/Plan     * UTI (urinary tract infection)- (present on admission)   Assessment & Plan    Start Rocephin, follow-up culture results.  Clinically better today; no abdom pain.  Home soon if continue to improve and culture known.  Spoke with daughter at bedside.        Persistent atrial fibrillation (HCC)- (present on admission)   Assessment & Plan    With rate currently controlled, anticoagulated with Coumadin; monitor INR        Mixed hyperlipidemia- (present on admission)   Assessment & Plan    Continue statin therapy.        Benign essential hypertension- (present on admission)   Assessment & Plan    Controlled with current medication regimen; adjust as indicated        Hypokalemia- (present on admission)   Assessment & Plan    Due to diuretic; replete and f/u level; check Mg        Debility- (present on admission)   Assessment & Plan    Due to acute illness; PT/OT eval.        CKD (chronic kidney disease) stage 3, GFR 30-59 ml/min- (present on admission)   Assessment & Plan    Stable; monitor; avoid nephrotoxic meds        Late onset Alzheimer's disease without behavioral disturbance- (present on admission)   Assessment & Plan    Stable. Baseline, patient ambulates independently and can go to toilet by self, needs some help with preparing meals, very poor short term memory but recognizes family and can voice needs.          Quality-Core Measures

## 2018-09-26 NOTE — THERAPY
OT mukul attempted RN requesting hold d/t issues w/IV and trying to get medication to finish infusing, nsg reports pt is near baseline need to confirm w/family/pt

## 2018-09-26 NOTE — CARE PLAN
Problem: Communication  Goal: The ability to communicate needs accurately and effectively will improve  Outcome: PROGRESSING AS EXPECTED  Report given bedside. Communication board in use. Pt and family encouraged to voice needs and feelings. Family verbalizes understanding. Family does not leave pt's bedside.

## 2018-09-26 NOTE — CARE PLAN
Problem: Venous Thromboembolism (VTW)/Deep Vein Thrombosis (DVT) Prevention:  Goal: Patient will participate in Venous Thrombosis (VTE)/Deep Vein Thrombosis (DVT)Prevention Measures  SCDs to BLE and Warfarin given for DVT prophylaxis.

## 2018-09-26 NOTE — DIETARY
Nutrition services: Day 1 of admit.  87 yo female admitted with UTI. She is noted on admitting screen to have had poor po intake and weight loss PTA.  Pt is on a regular diet taking % of meals.  Weight on this admit 71.5 kg is decreased 1.9 kg since July admit.  This is a 2% weight loss which is insignificant over this period of time.  BMI 28.83.    Recommendations/Plan:  1) encourage intake of meals. Order supplements if pt taking less than 50% of most meals.   2) document intake of all meals and supplements as % taken in ADL's to provide interdisciplinary communication across all shifts   3) monitor daily weights  4) Nutrition rep will continue to see patient for ongoing meal and snack preferences.

## 2018-09-26 NOTE — PROGRESS NOTES
Received bedside shift report from night RN. Pt alert to self, place and situation but unable to state the year. Pt denies pain at this time. Does not call appropriately. Bed and chair alarms are on and family is at bedside at all times. Pt is sitting up in the chair with family. Pt has many belongings with them in the room, all personal items are label with pt sticker. PIV assessed and is patent. Pt is on 1 L NC at times. POC discussed as well as unit routine, comfort, and safety. Dicussed DC planning to home with family. Discussed the goal for today: daily wt, mobility, labs and dc planning. Reviewed orders, notes, labs, and test results. Hourly rounding in place with RN rounding on odd hours and CNA on even hours. 12 hour chart check completed.

## 2018-09-27 ENCOUNTER — HOME HEALTH ADMISSION (OUTPATIENT)
Dept: HOME HEALTH SERVICES | Facility: HOME HEALTHCARE | Age: 83
End: 2018-09-27
Payer: MEDICARE

## 2018-09-27 VITALS
BODY MASS INDEX: 28.59 KG/M2 | RESPIRATION RATE: 20 BRPM | SYSTOLIC BLOOD PRESSURE: 106 MMHG | WEIGHT: 156.31 LBS | HEART RATE: 104 BPM | DIASTOLIC BLOOD PRESSURE: 56 MMHG | OXYGEN SATURATION: 93 % | TEMPERATURE: 97.8 F

## 2018-09-27 LAB
ANION GAP SERPL CALC-SCNC: 11 MMOL/L (ref 0–11.9)
BACTERIA UR CULT: ABNORMAL
BACTERIA UR CULT: ABNORMAL
BUN SERPL-MCNC: 37 MG/DL (ref 8–22)
CALCIUM SERPL-MCNC: 8.8 MG/DL (ref 8.5–10.5)
CHLORIDE SERPL-SCNC: 103 MMOL/L (ref 96–112)
CO2 SERPL-SCNC: 25 MMOL/L (ref 20–33)
CREAT SERPL-MCNC: 1.6 MG/DL (ref 0.5–1.4)
GLUCOSE SERPL-MCNC: 118 MG/DL (ref 65–99)
INR PPP: 2.47 (ref 0.87–1.13)
POTASSIUM SERPL-SCNC: 3.4 MMOL/L (ref 3.6–5.5)
PROTHROMBIN TIME: 26.8 SEC (ref 12–14.6)
SIGNIFICANT IND 70042: ABNORMAL
SITE SITE: ABNORMAL
SODIUM SERPL-SCNC: 139 MMOL/L (ref 135–145)
SOURCE SOURCE: ABNORMAL

## 2018-09-27 PROCEDURE — 665998 HH PPS REVENUE CREDIT

## 2018-09-27 PROCEDURE — 665999 HH PPS REVENUE DEBIT

## 2018-09-27 PROCEDURE — G8978 MOBILITY CURRENT STATUS: HCPCS | Mod: CI

## 2018-09-27 PROCEDURE — A9270 NON-COVERED ITEM OR SERVICE: HCPCS | Performed by: INTERNAL MEDICINE

## 2018-09-27 PROCEDURE — 700111 HCHG RX REV CODE 636 W/ 250 OVERRIDE (IP): Performed by: HOSPITALIST

## 2018-09-27 PROCEDURE — 80048 BASIC METABOLIC PNL TOTAL CA: CPT

## 2018-09-27 PROCEDURE — A9270 NON-COVERED ITEM OR SERVICE: HCPCS | Performed by: HOSPITALIST

## 2018-09-27 PROCEDURE — 97162 PT EVAL MOD COMPLEX 30 MIN: CPT

## 2018-09-27 PROCEDURE — 700102 HCHG RX REV CODE 250 W/ 637 OVERRIDE(OP): Performed by: INTERNAL MEDICINE

## 2018-09-27 PROCEDURE — 700105 HCHG RX REV CODE 258: Performed by: HOSPITALIST

## 2018-09-27 PROCEDURE — 36415 COLL VENOUS BLD VENIPUNCTURE: CPT

## 2018-09-27 PROCEDURE — 700102 HCHG RX REV CODE 250 W/ 637 OVERRIDE(OP): Performed by: HOSPITALIST

## 2018-09-27 PROCEDURE — 99239 HOSP IP/OBS DSCHRG MGMT >30: CPT | Performed by: INTERNAL MEDICINE

## 2018-09-27 PROCEDURE — 85610 PROTHROMBIN TIME: CPT

## 2018-09-27 PROCEDURE — G8979 MOBILITY GOAL STATUS: HCPCS | Mod: CI

## 2018-09-27 PROCEDURE — G8980 MOBILITY D/C STATUS: HCPCS | Mod: CI

## 2018-09-27 RX ORDER — SULFAMETHOXAZOLE AND TRIMETHOPRIM 800; 160 MG/1; MG/1
1 TABLET ORAL 2 TIMES DAILY
Qty: 6 TAB | Refills: 0 | Status: SHIPPED | OUTPATIENT
Start: 2018-09-27 | End: 2018-11-16

## 2018-09-27 RX ADMIN — METOPROLOL SUCCINATE 25 MG: 25 TABLET, EXTENDED RELEASE ORAL at 06:16

## 2018-09-27 RX ADMIN — HYDROCHLOROTHIAZIDE 12.5 MG: 12.5 TABLET ORAL at 06:16

## 2018-09-27 RX ADMIN — OMEPRAZOLE 20 MG: 20 CAPSULE, DELAYED RELEASE ORAL at 06:16

## 2018-09-27 RX ADMIN — POTASSIUM CHLORIDE 40 MEQ: 1500 TABLET, EXTENDED RELEASE ORAL at 06:16

## 2018-09-27 RX ADMIN — NISOLDIPINE 17 MG: 8.5 TABLET, FILM COATED, EXTENDED RELEASE ORAL at 06:16

## 2018-09-27 RX ADMIN — RIVASTIGMINE TARTRATE 4.5 MG: 1.5 CAPSULE ORAL at 06:16

## 2018-09-27 RX ADMIN — CEFTRIAXONE SODIUM 2 G: 2 INJECTION, POWDER, FOR SOLUTION INTRAMUSCULAR; INTRAVENOUS at 06:21

## 2018-09-27 RX ADMIN — LOSARTAN POTASSIUM 100 MG: 50 TABLET ORAL at 06:16

## 2018-09-27 RX ADMIN — TORSEMIDE 10 MG: 5 TABLET ORAL at 06:16

## 2018-09-27 ASSESSMENT — COGNITIVE AND FUNCTIONAL STATUS - GENERAL
MOBILITY SCORE: 23
TURNING FROM BACK TO SIDE WHILE IN FLAT BAD: A LITTLE
SUGGESTED CMS G CODE MODIFIER MOBILITY: CI

## 2018-09-27 ASSESSMENT — GAIT ASSESSMENTS
DISTANCE (FEET): 600
GAIT LEVEL OF ASSIST: SUPERVISED

## 2018-09-27 ASSESSMENT — PAIN SCALES - GENERAL: PAINLEVEL_OUTOF10: 0

## 2018-09-27 NOTE — DISCHARGE SUMMARY
Discharge Summary    CHIEF COMPLAINT ON ADMISSION  Chief Complaint   Patient presents with   • Abdominal Pain   • Bloody Stools       Reason for Admission  Bloody stool      Admission Date  9/24/2018    CODE STATUS  Full Code    HPI & HOSPITAL COURSE  This is a 86 y.o. female here with history of dementia without behavioral disturbance, coronary artery disease which is controlled, and dyslipidemia on statin therapy was in her usual state of health until the day prior to admission.  She was noted to have some abdominal discomfort.  She has a difficult time giving a history due to her dementia, did not have any nausea or vomiting did not have fever or chills and no dysuria.  At one point during the day, she was in the restroom, and emerged saying that she had had some rectal bleeding.  This prompted her family to bring her to the emergency department.  She can provide none of her history at this time due to her dementia.  Patient was found to have UTI which likely contributed to her symptoms and was treated with Rocephin with improvement.  Urine culture final result is still pending at time of discharge. There was no e/o rectal bleeding and patient was guiac negative in ED.  She is not a reliable historian due to advanced dementia.    Patient had hypokalemia which was repleted.  She will continue on potassium supplement at home.  She had debility due to acute illness but this has improved at time of discharge.  Home health has been ordered for PT/OT at home.  Patient has CKD 3 which has been stable here.  She is well cared for by her family at home and case have been discussed with daughter at bedside.        Therefore, she is discharged in fair and stable condition to home with close outpatient follow-up.    The patient met 2-midnight criteria for an inpatient stay at the time of discharge.    Discharge Date  9/27/18     FOLLOW UP ITEMS POST DISCHARGE  pcp next week    DISCHARGE DIAGNOSES  Principal Problem:    UTI  (urinary tract infection) POA: Yes  Active Problems:    Benign essential hypertension POA: Yes    Mixed hyperlipidemia POA: Yes    Persistent atrial fibrillation (HCC) POA: Yes    Hypokalemia POA: Yes    Late onset Alzheimer's disease without behavioral disturbance POA: Yes    CKD (chronic kidney disease) stage 3, GFR 30-59 ml/min POA: Yes    Debility POA: Yes  Resolved Problems:    Acute encephalopathy POA: Unknown      FOLLOW UP  Future Appointments  Date Time Provider Department Center   10/5/2018 11:00 AM [facility]   10/22/2018 10:30 AM Alonzo Flor M.D. [facility] None     No follow-up provider specified.    MEDICATIONS ON DISCHARGE     Medication List      START taking these medications      Instructions   sulfamethoxazole-trimethoprim 800-160 MG tablet  Commonly known as:  BACTRIM DS   Take 1 Tab by mouth 2 times a day.  Dose:  1 Tab        CONTINUE taking these medications      Instructions   atorvastatin 20 MG Tabs  Commonly known as:  LIPITOR   TAKE ONE TABLET BY MOUTH IN THE EVENING     losartan-hydrochlorothiazide 100-12.5 MG per tablet  Commonly known as:  HYZAAR   TAKE ONE TABLET BY MOUTH ONCE DAILY     MAGOX 400 PO   Take 1 tablet by mouth every day.  Dose:  1 tablet     metoprolol SR 25 MG Tb24  Commonly known as:  TOPROL XL   Take 50 mg by mouth every day.  Dose:  50 mg     NEXIUM 40 MG delayed-release capsule  Generic drug:  esomeprazole   Take 40 mg by mouth every morning before breakfast.  Dose:  40 mg     Nisoldipine 17 MG Tb24   TAKE 1 TABLET BY MOUTH EVERY DAY     potassium Chloride ER 20 MEQ Tbcr tablet  Commonly known as:  K-TAB   TAKE ONE TABLET BY MOUTH ONCE DAILY     rivastigmine 9.5 MG/24HR patch  Commonly known as:  EXELON   Apply 1 Patch to skin as directed every day.  Dose:  1 Patch     traZODone 50 MG Tabs  Commonly known as:  DESYREL   Take 50 mg by mouth every bedtime.  Dose:  50 mg     warfarin 2.5 MG Tabs  Commonly known as:  COUMADIN   Take 3.75 mg by  mouth every day.  Dose:  3.75 mg     ZOLOFT 50 MG Tabs  Generic drug:  sertraline   Take 50 mg by mouth every bedtime.  Dose:  50 mg        STOP taking these medications    torsemide 10 MG tablet  Commonly known as:  DEMADEX            Allergies  Allergies   Allergen Reactions   • Nkda [No Known Drug Allergy]        DIET  Orders Placed This Encounter   Procedures   • Diet Order Regular     Standing Status:   Standing     Number of Occurrences:   1     Order Specific Question:   Diet:     Answer:   Regular [1]       ACTIVITY  As tolerated.  Weight bearing as tolerated    CONSULTATIONS  none    PROCEDURES  none    LABORATORY  Lab Results   Component Value Date    SODIUM 139 09/27/2018    POTASSIUM 3.4 (L) 09/27/2018    CHLORIDE 103 09/27/2018    CO2 25 09/27/2018    GLUCOSE 118 (H) 09/27/2018    BUN 37 (H) 09/27/2018    CREATININE 1.60 (H) 09/27/2018    CREATININE 0.9 11/14/2005        Lab Results   Component Value Date    WBC 6.6 09/26/2018    HEMOGLOBIN 12.8 09/26/2018    HEMATOCRIT 39.1 09/26/2018    PLATELETCT 153 (L) 09/26/2018        Total time of the discharge process exceeds 35 minutes.

## 2018-09-27 NOTE — PROGRESS NOTES
Discharge to home with with and home health. Pt signed a copy of the discharge papers and confirms all questions have been answered by the MD or the RN. Second copy of d/c papers in chart. 1 Prescriptions provided to pt, pt home medications returned to her. IV discontinued. Pt states all person belongings are in possession. Pt escorted off unit with assistance from the CNA staff in a wheelchair without incident. Pt room has been stripped and is ready to be cleaned. No belongings left behind. Chart we taken to front RN station and all medications in pt drawer were either discarded or sent to pharmacy.

## 2018-09-27 NOTE — DISCHARGE PLANNING
ATTN: Case Management  RE: Referral for Home Health                We would like to take this opportunity to thank you for submitting a referral for your patient to continue care with [facility]. Our skilled team is dedicated to helping all patients recover and gain independence in the home setting.            As of 9/27/2018, we have accepted the above patient into our service. A [facility] clinician will be out to see the patient within 48 hours to conduct our initial visit. If you have any questions or concerns regarding the patient’s transition to Home Health, please do not hesitate to contact us. We are open for referrals 7 days a week from 8AM to 5PM at [phone].      We look forward to collaborating with you,  [facility] Team

## 2018-09-27 NOTE — DISCHARGE INSTRUCTIONS
Discharge Instructions    Discharged to home by car with relative. Discharged via wheelchair, hospital escort: Yes.  Special equipment needed: Not Applicable    Be sure to schedule a follow-up appointment with your primary care doctor or any specialists as instructed.     Discharge Plan:   Diet Plan: Discussed  Activity Level: Discussed  Confirmed Follow up Appointment: Patient to Call and Schedule Appointment  Confirmed Symptoms Management: Discussed  Medication Reconciliation Updated: Yes  Pneumococcal Vaccine Administered/Refused: Not given - Patient refused pneumococcal vaccine (refused - pt has appointment for following week for vaccine at PCP)  Influenza Vaccine Indication: Patient Refuses    I understand that a diet low in cholesterol, fat, and sodium is recommended for good health. Unless I have been given specific instructions below for another diet, I accept this instruction as my diet prescription.   Other diet: Regular as tolerated    Special Instructions: None    · Is patient discharged on Warfarin / Coumadin?   Yes    You are receiving the drug warfarin. Please understand the importance of monitoring warfarin with scheduled PT/INR blood draws.  Follow[facility]up with the [facility] in one week for INR lab..    IMPORTANT: HOW TO USE THIS INFORMATION:  This is a summary and does NOT have all possible information about this product. This information does not assure that this product is safe, effective, or appropriate for you. This information is not individual medical advice and does not substitute for the advice of your health care professional. Always ask your health care professional for complete information about this product and your specific health needs.      WARFARIN - ORAL (WARF-uh-rin)      COMMON BRAND NAME(S): Coumadin      WARNING:  Warfarin can cause very serious (possibly fatal) bleeding. This is more likely to occur when you first start taking this medication or if you take too much warfarin.  "To decrease your risk for bleeding, your doctor or other health care provider will monitor you closely and check your lab results (INR test) to make sure you are not taking too much warfarin. Keep all medical and laboratory appointments. Tell your doctor right away if you notice any signs of serious bleeding. See also Side Effects section.      USES:  This medication is used to treat blood clots (such as in deep vein thrombosis-DVT or pulmonary embolus-PE) and/or to prevent new clots from forming in your body. Preventing harmful blood clots helps to reduce the risk of a stroke or heart attack. Conditions that increase your risk of developing blood clots include a certain type of irregular heart rhythm (atrial fibrillation), heart valve replacement, recent heart attack, and certain surgeries (such as hip/knee replacement). Warfarin is commonly called a \"blood thinner,\" but the more correct term is \"anticoagulant.\" It helps to keep blood flowing smoothly in your body by decreasing the amount of certain substances (clotting proteins) in your blood.      HOW TO USE:  Read the Medication Guide provided by your pharmacist before you start taking warfarin and each time you get a refill. If you have any questions, ask your doctor or pharmacist. Take this medication by mouth with or without food as directed by your doctor or other health care professional, usually once a day. It is very important to take it exactly as directed. Do not increase the dose, take it more frequently, or stop using it unless directed by your doctor. Dosage is based on your medical condition, laboratory tests (such as INR), and response to treatment. Your doctor or other health care provider will monitor you closely while you are taking this medication to determine the right dose for you. Use this medication regularly to get the most benefit from it. To help you remember, take it at the same time each day. It is important to eat a balanced, " consistent diet while taking warfarin. Some foods can affect how warfarin works in your body and may affect your treatment and dose. Avoid sudden large increases or decreases in your intake of foods high in vitamin K (such as broccoli, cauliflower, cabbage, brussels sprouts, kale, spinach, and other green leafy vegetables, liver, green tea, certain vitamin supplements). If you are trying to lose weight, check with your doctor before you try to go on a diet. Cranberry products may also affect how your warfarin works. Limit the amount of cranberry juice (16 ounces/480 milliliters a day) or other cranberry products you may drink or eat.      SIDE EFFECTS:  Nausea, loss of appetite, or stomach/abdominal pain may occur. If any of these effects persist or worsen, tell your doctor or pharmacist promptly. Remember that your doctor has prescribed this medication because he or she has judged that the benefit to you is greater than the risk of side effects. Many people using this medication do not have serious side effects. This medication can cause serious bleeding if it affects your blood clotting proteins too much (shown by unusually high INR lab results). Even if your doctor stops your medication, this risk of bleeding can continue for up to a week. Tell your doctor right away if you have any signs of serious bleeding, including: unusual pain/swelling/discomfort, unusual/easy bruising, prolonged bleeding from cuts or gums, persistent/frequent nosebleeds, unusually heavy/prolonged menstrual flow, pink/dark urine, coughing up blood, vomit that is bloody or looks like coffee grounds, severe headache, dizziness/fainting, unusual or persistent tiredness/weakness, bloody/black/tarry stools, chest pain, shortness of breath, difficulty swallowing. Tell your doctor right away if any of these unlikely but serious side effects occur: persistent nausea/vomiting, severe stomach/abdominal pain, yellowing eyes/skin. This drug rarely has  caused very serious (possibly fatal) problems if its effects lead to small blood clots (usually at the beginning of treatment). This can lead to severe skin/tissue damage that may require surgery or amputation if left untreated. Patients with certain blood conditions (protein C or S deficiency) may be at greater risk. Get medical help right away if any of these rare but serious side effects occur: painful/red/purplish patches on the skin (such as on the toe, breast, abdomen), change in the amount of urine, vision changes, confusion, slurred speech, weakness on one side of the body. A very serious allergic reaction to this drug is rare. However, get medical help right away if you notice any symptoms of a serious allergic reaction, including: rash, itching/swelling (especially of the face/tongue/throat), severe dizziness, trouble breathing. This is not a complete list of possible side effects. If you notice other effects not listed above, contact your doctor or pharmacist. In the [place] - Call your doctor for medical advice about side effects. You may report side effects to FDA at 5-076-DPI-8215. In [place] - Call your doctor for medical advice about side effects. You may report side effects to [facility] [place] at 1-564.417.6174.      PRECAUTIONS:  Before taking warfarin, tell your doctor or pharmacist if you are allergic to it; or if you have any other allergies. This product may contain inactive ingredients, which can cause allergic reactions or other problems. Talk to your pharmacist for more details. Before using this medication, tell your doctor or pharmacist your medical history, especially of: blood disorders (such as anemia, hemophilia), bleeding problems (such as bleeding of the stomach/intestines, bleeding in the brain), blood vessel disorders (such as aneurysms), recent major injury/surgery, liver disease, alcohol use, mental/mood disorders (including memory problems), frequent falls/injuries. It is important that  all your doctors and dentists know that you take warfarin. Before having surgery or any medical/dental procedures, tell your doctor or dentist that you are taking this medication and about all the products you use (including prescription drugs, nonprescription drugs, and herbal products). Avoid getting injections into the muscles. If you must have an injection into a muscle (for example, a flu shot), it should be given in the arm. This way, it will be easier to check for bleeding and/or apply pressure bandages. This medication may cause stomach bleeding. Daily use of alcohol while using this medicine will increase your risk for stomach bleeding and may also affect how this medication works. Limit or avoid alcoholic beverages. If you have not been eating well, if you have an illness or infection that causes fever, vomiting, or diarrhea for more than 2 days, or if you start using any antibiotic medications, contact your doctor or pharmacist immediately because these conditions can affect how warfarin works. This medication can cause heavy bleeding. To lower the chance of getting cut, bruised, or injured, use great caution with sharp objects like safety razors and nail cutters. Use an electric razor when shaving and a soft toothbrush when brushing your teeth. Avoid activities such as contact sports. If you fall or injure yourself, especially if you hit your head, call your doctor immediately. Your doctor may need to check you. The Food & Drug Administration has stated that generic warfarin products are interchangeable. However, consult your doctor or pharmacist before switching warfarin products. Be careful not to take more than one medication that contains warfarin unless specifically directed by the doctor or health care provider who is monitoring your warfarin treatment. Older adults may be at greater risk for bleeding while using this drug. This medication is not recommended for use during pregnancy because of  "serious (possibly fatal) harm to an unborn baby. Discuss the use of reliable forms of birth control with your doctor. If you become pregnant or think you may be pregnant, tell your doctor immediately. If you are planning pregnancy, discuss a plan for managing your condition with your doctor before you become pregnant. Your doctor may switch the type of medication you use during pregnancy. Very small amounts of this medication may pass into breast milk but is unlikely to harm a nursing infant. Consult your doctor before breast-feeding.      DRUG INTERACTIONS:  Drug interactions may change how your medications work or increase your risk for serious side effects. This document does not contain all possible drug interactions. Keep a list of all the products you use (including prescription/nonprescription drugs and herbal products) and share it with your doctor and pharmacist. Do not start, stop, or change the dosage of any medicines without your doctor's approval. Warfarin interacts with many prescription, nonprescription, vitamin, and herbal products. This includes medications that are applied to the skin or inside the vagina or rectum. The interactions with warfarin usually result in an increase or decrease in the \"blood-thinning\" (anticoagulant) effect. Your doctor or other health care professional should closely monitor you to prevent serious bleeding or clotting problems. While taking warfarin, it is very important to tell your doctor or pharmacist of any changes in medications, vitamins, or herbal products that you are taking. Some products that may interact with this drug include: capecitabine, imatinib, mifepristone. Aspirin, aspirin-like drugs (salicylates), and nonsteroidal anti-inflammatory drugs (NSAIDs such as ibuprofen, naproxen, celecoxib) may have effects similar to warfarin. These drugs may increase the risk of bleeding problems if taken during treatment with warfarin. Carefully check all " prescription/nonprescription product labels (including drugs applied to the skin such as pain-relieving creams) since the products may contain NSAIDs or salicylates. Talk to your doctor about using a different medication (such as acetaminophen) to treat pain/fever. Low-dose aspirin and related drugs (such as clopidogrel, ticlopidine) should be continued if prescribed by your doctor for specific medical reasons such as heart attack or stroke prevention. Consult your doctor or pharmacist for more details. Many herbal products interact with warfarin. Tell your doctor before taking any herbal products, especially bromelains, coenzyme Q10, cranberry, danshen, dong quai, fenugreek, garlic, ginkgo biloba, ginseng, and Brunswick's wort, among others. This medication may interfere with a certain laboratory test to measure theophylline levels, possibly causing false test results. Make sure laboratory personnel and all your doctors know you use this drug.      OVERDOSE:  If overdose is suspected, contact a poison control center or emergency room immediately. [place] residents can call the [place] National Poison Hotline at 1-664.447.4056. [place] residents can call a provincial poison control center. Symptoms of overdose may include: bloody/black/tarry stools, pink/dark urine, unusual/prolonged bleeding.      NOTES:  Do not share this medication with others. Laboratory and/or medical tests (such as INR, complete blood count) must be performed periodically to monitor your progress or check for side effects. Consult your doctor for more details.      MISSED DOSE:  For the best possible benefit, do not miss any doses. If you do miss a dose and remember on the same day, take it as soon as you remember. If you remember on the next day, skip the missed dose and resume your usual dosing schedule. Do not double the dose to catch up because this could increase your risk for bleeding. Keep a record of missed doses to give to your doctor or  pharmacist. Contact your doctor or pharmacist if you miss 2 or more doses in a row.      STORAGE:  Store at room temperature away from light and moisture. Do not store in the bathroom. Keep all medications away from children and pets. Do not flush medications down the toilet or pour them into a drain unless instructed to do so. Properly discard this product when it is  or no longer needed. Consult your pharmacist or local waste disposal company for more details about how to safely discard your product.      MEDICAL ALERT:  Your condition and medication can cause complications in a medical emergency. For information about enrolling in MedicAlert, call 1-770.605.3086 ([place]) or 1-362.393.7068 ([place]).      Information last revised 2010 Copyright(c) 2010 [employer]           Urinary Tract Infection, Adult  Introduction  A urinary tract infection (UTI) is an infection of any part of the urinary tract. The urinary tract includes the:  · Kidneys.  · Ureters.  · Bladder.  · Urethra.  These organs make, store, and get rid of pee (urine) in the body.  Follow these instructions at home:  · Take over-the-counter and prescription medicines only as told by your doctor.  · If you were prescribed an antibiotic medicine, take it as told by your doctor. Do not stop taking the antibiotic even if you start to feel better.  · Avoid the following drinks:  ¨ Alcohol.  ¨ Caffeine.  ¨ Tea.  ¨ Carbonated drinks.  · Drink enough fluid to keep your pee clear or pale yellow.  · Keep all follow-up visits as told by your doctor. This is important.  · Make sure to:  ¨ Empty your bladder often and completely. Do not to hold pee for long periods of time.  ¨ Empty your bladder before and after sex.  ¨ Wipe from front to back after a bowel movement if you are female. Use each tissue one time when you wipe.  Contact a doctor if:  · You have back pain.  · You have a fever.  · You feel sick to your stomach (nauseous).  · You throw  up (vomit).  · Your symptoms do not get better after 3 days.  · Your symptoms go away and then come back.  Get help right away if:  · You have very bad back pain.  · You have very bad lower belly (abdominal) pain.  · You are throwing up and cannot keep down any medicines or water.  This information is not intended to replace advice given to you by your health care provider. Make sure you discuss any questions you have with your health care provider.  Document Released: 06/05/2009 Document Revised: 05/25/2017 Document Reviewed: 11/07/2016  © 2017 [employer]      Fall Prevention in the Home  Introduction  Falls can cause injuries. They can happen to people of all ages. There are many things you can do to make your home safe and to help prevent falls.  What can I do on the outside of my home?  · Regularly fix the edges of walkways and driveways and fix any cracks.  · Remove anything that might make you trip as you walk through a door, such as a raised step or threshold.  · Trim any bushes or trees on the path to your home.  · Use bright outdoor lighting.  · Clear any walking paths of anything that might make someone trip, such as rocks or tools.  · Regularly check to see if handrails are loose or broken. Make sure that both sides of any steps have handrails.  · Any raised decks and porches should have guardrails on the edges.  · Have any leaves, snow, or ice cleared regularly.  · Use sand or salt on walking paths during winter.  · Clean up any spills in your garage right away. This includes oil or grease spills.  What can I do in the bathroom?  · Use night lights.  · Install grab bars by the toilet and in the tub and shower. Do not use towel bars as grab bars.  · Use non-skid mats or decals in the tub or shower.  · If you need to sit down in the shower, use a plastic, non-slip stool.  · Keep the floor dry. Clean up any water that spills on the floor as soon as it happens.  · Remove soap buildup in the tub or shower  regularly.  · Attach bath mats securely with double-sided non-slip rug tape.  · Do not have throw rugs and other things on the floor that can make you trip.  What can I do in the bedroom?  · Use night lights.  · Make sure that you have a light by your bed that is easy to reach.  · Do not use any sheets or blankets that are too big for your bed. They should not hang down onto the floor.  · Have a firm chair that has side arms. You can use this for support while you get dressed.  · Do not have throw rugs and other things on the floor that can make you trip.  What can I do in the kitchen?  · Clean up any spills right away.  · Avoid walking on wet floors.  · Keep items that you use a lot in easy-to-reach places.  · If you need to reach something above you, use a strong step stool that has a grab bar.  · Keep electrical cords out of the way.  · Do not use floor polish or wax that makes floors slippery. If you must use wax, use non-skid floor wax.  · Do not have throw rugs and other things on the floor that can make you trip.  What can I do with my stairs?  · Do not leave any items on the stairs.  · Make sure that there are handrails on both sides of the stairs and use them. Fix handrails that are broken or loose. Make sure that handrails are as long as the stairways.  · Check any carpeting to make sure that it is firmly attached to the stairs. Fix any carpet that is loose or worn.  · Avoid having throw rugs at the top or bottom of the stairs. If you do have throw rugs, attach them to the floor with carpet tape.  · Make sure that you have a light switch at the top of the stairs and the bottom of the stairs. If you do not have them, ask someone to add them for you.  What else can I do to help prevent falls?  · Wear shoes that:  ¨ Do not have high heels.  ¨ Have rubber bottoms.  ¨ Are comfortable and fit you well.  ¨ Are closed at the toe. Do not wear sandals.  · If you use a stepladder:  ¨ Make sure that it is fully  opened. Do not climb a closed stepladder.  ¨ Make sure that both sides of the stepladder are locked into place.  ¨ Ask someone to hold it for you, if possible.  · Clearly noah and make sure that you can see:  ¨ Any grab bars or handrails.  ¨ First and last steps.  ¨ Where the edge of each step is.  · Use tools that help you move around (mobility aids) if they are needed. These include:  ¨ Canes.  ¨ Walkers.  ¨ Scooters.  ¨ Crutches.  · Turn on the lights when you go into a dark area. Replace any light bulbs as soon as they burn out.  · Set up your furniture so you have a clear path. Avoid moving your furniture around.  · If any of your floors are uneven, fix them.  · If there are any pets around you, be aware of where they are.  · Review your medicines with your doctor. Some medicines can make you feel dizzy. This can increase your chance of falling.  Ask your doctor what other things that you can do to help prevent falls.  This information is not intended to replace advice given to you by your health care provider. Make sure you discuss any questions you have with your health care provider.  Document Released: 10/14/2010 Document Revised: 05/25/2017 Document Reviewed: 01/22/2016  © 2017 [employer]      Depression / Suicide Risk    As you are discharged from this [facility] facility, it is important to learn how to keep safe from harming yourself.    Recognize the warning signs:  · Abrupt changes in personality, positive or negative- including increase in energy   · Giving away possessions  · Change in eating patterns- significant weight changes-  positive or negative  · Change in sleeping patterns- unable to sleep or sleeping all the time   · Unwillingness or inability to communicate  · Depression  · Unusual sadness, discouragement and loneliness  · Talk of wanting to die  · Neglect of personal appearance   · Rebelliousness- reckless behavior  · Withdrawal from people/activities they love  · Confusion- inability  to concentrate     If you or a loved one observes any of these behaviors or has concerns about self-harm, here's what you can do:  · Talk about it- your feelings and reasons for harming yourself  · Remove any means that you might use to hurt yourself (examples: pills, rope, extension cords, firearm)  · Get professional help from the community (Mental Health, Substance Abuse, psychological counseling)  · Do not be alone:Call your Safe Contact- someone whom you trust who will be there for you.  · Call your local CRISIS HOTLINE 241-4585 or 194-657-1507  · Call your local Children's Mobile Crisis Response Team [place] (628) 188-6125 or www.[employer]  · Call the toll free National Suicide Prevention Hotlines   · National Suicide Prevention Lifeline 508-475-KTAK (7196)  · National Hope Line Network 800-SUICIDE (819-7685)

## 2018-09-27 NOTE — DISCHARGE PLANNING
Anticipated Discharge Disposition: Home with Home Health    Action: Met with patient and her daughter at bedside to discharge discharge plans. Agreeable to HH, has had [facility] in the past and choice obtained, and faxed to [facility]    Barriers to Discharge: pending HH acceptance.     Plan: Home

## 2018-09-27 NOTE — FACE TO FACE
Face to Face Supporting Documentation - Home Health    The encounter with this patient was in whole or in part the primary reason for home health admission.    Date of encounter:   Patient:                    MRN:                       YOB: 2018  Arianne Irving  [id number]  11/3/1931     Home health to see patient for:  Physical Therapy evaluation and treatment    Skilled need for:  New Onset Medical Diagnosis uti    Skilled nursing interventions to include:  Comment: PT/OT    Homebound status evidenced by:  Needs the assistance of another person in order to leave the home. Leaving home requires a considerable and taxing effort. There is a normal inability to leave the home.    Community Physician to provide follow up care: Elli Dickerson M.D.     Optional Interventions? No      I certify the face to face encounter for this home health care referral meets the CMS requirements and the encounter/clinical assessment with the patient was, in whole, or in part, for the medical condition(s) listed above, which is the primary reason for home health care. Based on my clinical findings: the service(s) are medically necessary, support the need for home health care, and the homebound criteria are met.  I certify that this patient has had a face to face encounter by myself.  Peter Godoy M.D. - NPI: [id number]

## 2018-09-27 NOTE — PROGRESS NOTES
Received updated bedside shift report from night RN. Pt alert to self, place and situation unable to states the year. Pt states no pain currently. Does call appropriately. Bed and chair alarms are on. Pt is sitting up in the chair. Pt has 2 bags of belonging with them in the room, all personal items are label with pt sticker. PIV assessed and is patent. Pt is on RA. POC discussed as well as unit routine, comfort, and safety. Dicussed DC planning to home today with home health. Discussed the goal for today: dc education and mobility. Reviewed orders, notes, labs, and test results. Hourly rounding in place with RN rounding on odd hours and CNA on even hours. 12 hour chart check completed.

## 2018-09-27 NOTE — DISCHARGE PLANNING
Received Choice form at [phone]  Agency/Facility Name: [facility]  Referral sent per Choice form @ [phone]

## 2018-09-27 NOTE — PROGRESS NOTES
Inpatient Anticoagulation Service Note  Date: 9/26/2018    Reason for Anticoagulation: Atrial Fibrillation   [id number] VASc Score: 5  Target INR: 2.0 to 3.0    Hemoglobin Value: 12.8  Hematocrit Value: 39.1  Lab Platelet Value: (!) 153    INR from last 7 days     Date/Time INR Value    09/26/18 0515 (!)  2.32    09/25/18 0036 (!)  2.32        Dose from last 7 days     Date/Time Dose (mg)    09/26/18 1700  3.75    09/25/18 1000  3.75        Average Dose (mg):  (9/21 [facility]: 3.75 mg daily)  Significant Interactions: Proton Pump Inhibitor, Statin, Sertraline, Trazodone, Torsemide, HCTZ,         Antibiotics (Ceftriaxone 9/25--  Bridge Therapy: No    HPI: 87 yo, female, with history of Afib on warfarin therapy. [id number] VASc Score of 5. Presents to hospital with abdominal pain, treating for UTI. Hx of dementia, poor historian. Followed at [facility]- last seen 9/21 home dose 3.75 mg (2.5 mg x1.5 tablets) daily stabilized on this dose since beginning of September.    Inpatient Diet: Regular    A/P: INR today remains therapeutic. No s/sx of bleeding/thrombosis. DDIs have been established prior to admit. Patient has been started on Ceftriaxone however minimal concern for DDI with this antibiotic. Patient did take her warfarin dose on 9/24 prior to admission. Continue patient's home regimen of warfarin 3.75 mg daily, repeat INR in AM. Pharmacist will continue to monitor daily and adjust dose accordingly.        Education Material Provided?: No (chronic warfarin/dementia)  Pharmacist suggested discharge dosing: continue home regimen of warfarin 3.75 mg PO daily, repeat INR in 2-3 days after discharge.    Carlita Chung, Pharm.D

## 2018-09-27 NOTE — DISCHARGE PLANNING
Agency/Facility Name: [facility]  Outcome: Patient Accepted    MARY JO Black notified via Skype

## 2018-09-27 NOTE — DISCHARGE PLANNING
[facility] has received your referral and it is pending review by one of our nursing supervisors for HH appropriateness. Once a decision is met [facility] will notify [facility].     Thank you    100

## 2018-09-27 NOTE — CARE PLAN
Problem: Discharge Barriers/Planning  Goal: Patient's continuum of care needs will be met  Outcome: PROGRESSING AS EXPECTED  Pt cleared to dc today with HH [facility]    Problem: Urinary Elimination:  Goal: Ability to reestablish a normal urinary elimination pattern will improve  Outcome: PROGRESSING AS EXPECTED  Pt is voiding without issues

## 2018-09-27 NOTE — CARE PLAN
Problem: Knowledge Deficit  Goal: Knowledge of disease process/condition, treatment plan, diagnostic tests, and medications will improve  Outcome: PROGRESSING AS EXPECTED   Reviewed POC including safety, mobility, pain management, medication administration, DVT prophylaxis, monitoring labs, and discharge. Pt's daughters continuously at bedside. Daughters serve as pt's caregiver at home. Pt needs frequent reorientation, however, family understands plan of care.     Problem: Skin Integrity  Goal: Risk for impaired skin integrity will decrease  Outcome: PROGRESSING AS EXPECTED  Waffle overlay in place. Pt encouraged to turn while in bed and ambulate during the day. Pillows in use for positioning and support. Skin intact. Sacrum pink and blanching.

## 2018-09-27 NOTE — THERAPY
"Physical Therapy Evaluation completed.   Bed Mobility:     Transfers: Sit to Stand: Supervised  Gait: Level Of Assist: Supervised with No Equipment Needed       Plan of Care: Patient with no further skilled PT needs in the acute care setting at this time  Discharge Recommendations: Equipment: No Equipment Needed. Post-acute therapy Currently anticipate no further skilled therapy needs once patient is discharged from the inpatient setting.    See \"Rehab Therapy-Acute\" Patient Summary Report for complete documentation.     "

## 2018-09-28 ENCOUNTER — HOME CARE VISIT (OUTPATIENT)
Dept: HOME HEALTH SERVICES | Facility: HOME HEALTHCARE | Age: 83
End: 2018-09-28
Payer: MEDICARE

## 2018-09-28 ENCOUNTER — TELEPHONE (OUTPATIENT)
Dept: HEALTH INFORMATION MANAGEMENT | Facility: OTHER | Age: 83
End: 2018-09-28

## 2018-09-28 VITALS
HEART RATE: 80 BPM | BODY MASS INDEX: 27.34 KG/M2 | HEIGHT: 62 IN | OXYGEN SATURATION: 94 % | WEIGHT: 148.6 LBS | SYSTOLIC BLOOD PRESSURE: 126 MMHG | TEMPERATURE: 98.7 F | DIASTOLIC BLOOD PRESSURE: 76 MMHG | RESPIRATION RATE: 16 BRPM

## 2018-09-28 DIAGNOSIS — Z79.01 CHRONIC ANTICOAGULATION: ICD-10-CM

## 2018-09-28 PROCEDURE — 665001 SOC-HOME HEALTH

## 2018-09-28 PROCEDURE — G0493 RN CARE EA 15 MIN HH/HOSPICE: HCPCS

## 2018-09-28 PROCEDURE — 665999 HH PPS REVENUE DEBIT

## 2018-09-28 PROCEDURE — 665998 HH PPS REVENUE CREDIT

## 2018-09-28 SDOH — ECONOMIC STABILITY: HOUSING INSECURITY: UNSAFE APPLIANCES: 0

## 2018-09-28 SDOH — ECONOMIC STABILITY: HOUSING INSECURITY: UNSAFE COOKING RANGE AREA: 0

## 2018-09-28 ASSESSMENT — ENCOUNTER SYMPTOMS
SHORTNESS OF BREATH: T
VOMITING: PATIENT VERBALIZES NO EMESIS.
DIFFICULTY THINKING: 1
NAUSEA: PATIENT VERBALIZES NO NAUSEA.

## 2018-09-28 ASSESSMENT — ACTIVITIES OF DAILY LIVING (ADL)
OASIS_M1830: 03
HOME_HEALTH_OASIS: 01

## 2018-09-28 ASSESSMENT — PATIENT HEALTH QUESTIONNAIRE - PHQ9
1. LITTLE INTEREST OR PLEASURE IN DOING THINGS: 00
2. FEELING DOWN, DEPRESSED, IRRITABLE, OR HOPELESS: 00

## 2018-09-28 NOTE — TELEPHONE ENCOUNTER
Referral from [facility]. Med review completed. No clinically significant medication related issues noted.

## 2018-09-29 PROCEDURE — 665998 HH PPS REVENUE CREDIT

## 2018-09-29 PROCEDURE — 665999 HH PPS REVENUE DEBIT

## 2018-09-30 PROCEDURE — 665999 HH PPS REVENUE DEBIT

## 2018-09-30 PROCEDURE — 665998 HH PPS REVENUE CREDIT

## 2018-10-01 ENCOUNTER — HOME CARE VISIT (OUTPATIENT)
Dept: HOME HEALTH SERVICES | Facility: HOME HEALTHCARE | Age: 83
End: 2018-10-01
Payer: MEDICARE

## 2018-10-01 PROCEDURE — 665998 HH PPS REVENUE CREDIT

## 2018-10-01 PROCEDURE — 665999 HH PPS REVENUE DEBIT

## 2018-10-02 ENCOUNTER — HOME CARE VISIT (OUTPATIENT)
Dept: HOME HEALTH SERVICES | Facility: HOME HEALTHCARE | Age: 83
End: 2018-10-02
Payer: MEDICARE

## 2018-10-02 PROCEDURE — 665998 HH PPS REVENUE CREDIT

## 2018-10-02 PROCEDURE — 665999 HH PPS REVENUE DEBIT

## 2018-10-03 ENCOUNTER — HOME CARE VISIT (OUTPATIENT)
Dept: HOME HEALTH SERVICES | Facility: HOME HEALTHCARE | Age: 83
End: 2018-10-03
Payer: MEDICARE

## 2018-10-03 VITALS
OXYGEN SATURATION: 94 % | RESPIRATION RATE: 16 BRPM | HEART RATE: 90 BPM | DIASTOLIC BLOOD PRESSURE: 78 MMHG | SYSTOLIC BLOOD PRESSURE: 118 MMHG | TEMPERATURE: 98.1 F

## 2018-10-03 PROCEDURE — 665998 HH PPS REVENUE CREDIT

## 2018-10-03 PROCEDURE — G0151 HHCP-SERV OF PT,EA 15 MIN: HCPCS

## 2018-10-03 PROCEDURE — 665999 HH PPS REVENUE DEBIT

## 2018-10-03 SDOH — ECONOMIC STABILITY: HOUSING INSECURITY

## 2018-10-03 SDOH — ECONOMIC STABILITY: HOUSING INSECURITY: UNSAFE COOKING RANGE AREA: 0

## 2018-10-03 SDOH — ECONOMIC STABILITY: HOUSING INSECURITY: UNSAFE APPLIANCES: 0

## 2018-10-03 ASSESSMENT — ACTIVITIES OF DAILY LIVING (ADL)
SHOPPING_ASSISTANCE: 6
ORAL_CARE_ASSISTANCE: 0
TRANSPORTATION_ASSISTANCE: 6
MEAL_PREP_ASSISTANCE: 2
BATHING_ASSISTANCE: 4
GROOMING_ASSISTANCE: 0
TOILETING_ASSISTANCE: 0
BATHING_ASSISTANCE: 3
DRESSING_UB_ASSISTANCE: 0
LAUNDRY_ASSISTANCE: 6
HOUSEKEEPING_ASSISTANCE: 6
TELEPHONE_ASSISTANCE: 6
MEAL_PREP_ASSISTANCE: 1
SHOPPING_ASSISTANCE: 5
EATING_ASSISTANCE: 0
DRESSING_LB_ASSISTANCE: 0

## 2018-10-03 ASSESSMENT — BALANCE ASSESSMENTS
NUDGED: 2
TURNING 360 DEGREES STEADINESS: 1
BALANCE SCORE: 11
SITTING DOWN: 1
EYES CLOSED AT MAXIMUM POSITION NUDGED: 1
STANDING BALANCE: 1
SITTING BALANCE: 1
ATTEMPTS TO ARISE: 1
SURVEY COMPLETE: TRUE
TURNING 360 DEGREES STEPS: 0
IMMEDIATE STANDING BALANCE FIRST 5 SECONDS: 2
ARISES: 1

## 2018-10-03 ASSESSMENT — GAIT ASSESSMENTS
RIGHT STEP CLEAR: 1
GAIT SCORE: 10
TRUNK: 1
PATH: 2
LEFT STEP CLEAR: 1
TIME: 0
INITIATION OF GAIT IMMEDIATELY AFTER GO: 1
STEP SYMMETRY: 1
STEP CONTINUITY: 1
BALANCE AND GAIT SCORE: 21
LEFT STEP PASS: 1
SURVEY COMPLETE: TRUE
RIGHT STEP PASS: 1

## 2018-10-03 ASSESSMENT — ENCOUNTER SYMPTOMS: DIFFICULTY THINKING: 1

## 2018-10-04 ENCOUNTER — HOME CARE VISIT (OUTPATIENT)
Dept: HOME HEALTH SERVICES | Facility: HOME HEALTHCARE | Age: 83
End: 2018-10-04
Payer: MEDICARE

## 2018-10-04 PROCEDURE — G0155 HHCP-SVS OF CSW,EA 15 MIN: HCPCS

## 2018-10-04 PROCEDURE — 665998 HH PPS REVENUE CREDIT

## 2018-10-04 PROCEDURE — 665999 HH PPS REVENUE DEBIT

## 2018-10-04 ASSESSMENT — SOCIAL DETERMINANTS OF HEALTH (SDOH): IS CONCERNED ABOUT INCOME: N

## 2018-10-05 ENCOUNTER — HOME CARE VISIT (OUTPATIENT)
Dept: HOME HEALTH SERVICES | Facility: HOME HEALTHCARE | Age: 83
End: 2018-10-05
Payer: MEDICARE

## 2018-10-05 ENCOUNTER — ANTICOAGULATION MONITORING (OUTPATIENT)
Dept: VASCULAR LAB | Facility: MEDICAL CENTER | Age: 83
End: 2018-10-05

## 2018-10-05 VITALS
OXYGEN SATURATION: 98 % | TEMPERATURE: 98.6 F | DIASTOLIC BLOOD PRESSURE: 87 MMHG | RESPIRATION RATE: 17 BRPM | SYSTOLIC BLOOD PRESSURE: 119 MMHG | HEART RATE: 75 BPM

## 2018-10-05 DIAGNOSIS — I48.19 PERSISTENT ATRIAL FIBRILLATION (HCC): ICD-10-CM

## 2018-10-05 LAB — INR PPP: 2.8 (ref 2–3.5)

## 2018-10-05 PROCEDURE — 665999 HH PPS REVENUE DEBIT

## 2018-10-05 PROCEDURE — G0495 RN CARE TRAIN/EDU IN HH: HCPCS

## 2018-10-05 PROCEDURE — 665998 HH PPS REVENUE CREDIT

## 2018-10-05 ASSESSMENT — ENCOUNTER SYMPTOMS
RESPIRATORY SYMPTOMS COMMENTS: RESP. EVEN AND NONLABORED. NO DISTRESS.
VOMITING: DENIES
NAUSEA: DENIES

## 2018-10-06 PROCEDURE — 665999 HH PPS REVENUE DEBIT

## 2018-10-06 PROCEDURE — 665998 HH PPS REVENUE CREDIT

## 2018-10-07 PROCEDURE — 665999 HH PPS REVENUE DEBIT

## 2018-10-07 PROCEDURE — 665998 HH PPS REVENUE CREDIT

## 2018-10-08 ENCOUNTER — HOME CARE VISIT (OUTPATIENT)
Dept: HOME HEALTH SERVICES | Facility: HOME HEALTHCARE | Age: 83
End: 2018-10-08
Payer: MEDICARE

## 2018-10-08 VITALS
TEMPERATURE: 98.1 F | BODY MASS INDEX: 26.7 KG/M2 | RESPIRATION RATE: 17 BRPM | SYSTOLIC BLOOD PRESSURE: 110 MMHG | HEART RATE: 86 BPM | DIASTOLIC BLOOD PRESSURE: 60 MMHG | OXYGEN SATURATION: 95 % | WEIGHT: 146 LBS

## 2018-10-08 PROCEDURE — 665999 HH PPS REVENUE DEBIT

## 2018-10-08 PROCEDURE — 665998 HH PPS REVENUE CREDIT

## 2018-10-08 PROCEDURE — G0152 HHCP-SERV OF OT,EA 15 MIN: HCPCS

## 2018-10-08 SDOH — ECONOMIC STABILITY: HOUSING INSECURITY
HOME SAFETY: PT'S BEDROOM IS UP FULL FLIGHT OF STAIRS. DTR TO GET GRAB BARS FOR SHOWER. HAS SUCTION GRAB BAR THAT IS TOO BIG FOR SHOWER. EDUCATED IN UNRELIABILITY OF SUCTION GRAB BARS, RECOMMEND MOUNTED GRAB BARS.

## 2018-10-08 ASSESSMENT — ACTIVITIES OF DAILY LIVING (ADL)
SHOPPING_ASSISTANCE: 6
TRANSPORTATION_ASSISTANCE: 6
ORAL_CARE_ASSISTANCE: 1
HOUSEKEEPING_ASSISTANCE: 5
DRESSING_LB_ASSISTANCE: 1
BATHING_ASSISTIVE_EQUIPMENT_USED: SHOWER CHAIR
BATHING_ASSISTANCE: 4
DRESSING_UB_ASSISTANCE: 1
EATING_ASSISTANCE: 0
MEAL_PREP_ASSISTANCE: 5
LAUNDRY_ASSISTANCE: 6
GROOMING_ASSISTANCE: 1
TOILETING_ASSISTANCE: 1

## 2018-10-08 ASSESSMENT — ENCOUNTER SYMPTOMS: DIFFICULTY THINKING: 1

## 2018-10-09 ENCOUNTER — HOSPITAL ENCOUNTER (OUTPATIENT)
Facility: MEDICAL CENTER | Age: 83
End: 2018-10-09
Attending: INTERNAL MEDICINE
Payer: MEDICARE

## 2018-10-09 ENCOUNTER — HOME CARE VISIT (OUTPATIENT)
Dept: HOME HEALTH SERVICES | Facility: HOME HEALTHCARE | Age: 83
End: 2018-10-09
Payer: MEDICARE

## 2018-10-09 VITALS
HEART RATE: 85 BPM | DIASTOLIC BLOOD PRESSURE: 79 MMHG | OXYGEN SATURATION: 94 % | SYSTOLIC BLOOD PRESSURE: 121 MMHG | RESPIRATION RATE: 18 BRPM | TEMPERATURE: 97.9 F

## 2018-10-09 LAB — BNP SERPL-MCNC: 445 PG/ML (ref 0–100)

## 2018-10-09 PROCEDURE — G0299 HHS/HOSPICE OF RN EA 15 MIN: HCPCS

## 2018-10-09 PROCEDURE — 80048 BASIC METABOLIC PNL TOTAL CA: CPT

## 2018-10-09 PROCEDURE — 665999 HH PPS REVENUE DEBIT

## 2018-10-09 PROCEDURE — 83880 ASSAY OF NATRIURETIC PEPTIDE: CPT

## 2018-10-09 PROCEDURE — 665998 HH PPS REVENUE CREDIT

## 2018-10-09 ASSESSMENT — ENCOUNTER SYMPTOMS
NAUSEA: DENIES
VOMITING: DENIES
RESPIRATORY SYMPTOMS COMMENTS: RESP. EVEN AND NONLABORED. NO DISTRESS.

## 2018-10-10 ENCOUNTER — HOME CARE VISIT (OUTPATIENT)
Dept: HOME HEALTH SERVICES | Facility: HOME HEALTHCARE | Age: 83
End: 2018-10-10
Payer: MEDICARE

## 2018-10-10 VITALS
SYSTOLIC BLOOD PRESSURE: 118 MMHG | DIASTOLIC BLOOD PRESSURE: 72 MMHG | TEMPERATURE: 98.6 F | HEART RATE: 87 BPM | RESPIRATION RATE: 18 BRPM | OXYGEN SATURATION: 94 %

## 2018-10-10 LAB
ANION GAP SERPL CALC-SCNC: 10 MMOL/L (ref 0–11.9)
BUN SERPL-MCNC: 30 MG/DL (ref 8–22)
CALCIUM SERPL-MCNC: 9.9 MG/DL (ref 8.5–10.5)
CHLORIDE SERPL-SCNC: 97 MMOL/L (ref 96–112)
CO2 SERPL-SCNC: 26 MMOL/L (ref 20–33)
CREAT SERPL-MCNC: 1.49 MG/DL (ref 0.5–1.4)
GLUCOSE SERPL-MCNC: 89 MG/DL (ref 65–99)
POTASSIUM SERPL-SCNC: 3.5 MMOL/L (ref 3.6–5.5)
SODIUM SERPL-SCNC: 133 MMOL/L (ref 135–145)

## 2018-10-10 PROCEDURE — 665999 HH PPS REVENUE DEBIT

## 2018-10-10 PROCEDURE — 665998 HH PPS REVENUE CREDIT

## 2018-10-10 PROCEDURE — G0151 HHCP-SERV OF PT,EA 15 MIN: HCPCS

## 2018-10-11 ENCOUNTER — HOME CARE VISIT (OUTPATIENT)
Dept: HOME HEALTH SERVICES | Facility: HOME HEALTHCARE | Age: 83
End: 2018-10-11
Payer: MEDICARE

## 2018-10-11 PROCEDURE — 665999 HH PPS REVENUE DEBIT

## 2018-10-11 PROCEDURE — 665998 HH PPS REVENUE CREDIT

## 2018-10-12 ENCOUNTER — ANTICOAGULATION MONITORING (OUTPATIENT)
Dept: VASCULAR LAB | Facility: MEDICAL CENTER | Age: 83
End: 2018-10-12

## 2018-10-12 ENCOUNTER — HOME CARE VISIT (OUTPATIENT)
Dept: HOME HEALTH SERVICES | Facility: HOME HEALTHCARE | Age: 83
End: 2018-10-12
Payer: MEDICARE

## 2018-10-12 VITALS
RESPIRATION RATE: 18 BRPM | TEMPERATURE: 98.3 F | BODY MASS INDEX: 26.81 KG/M2 | OXYGEN SATURATION: 94 % | DIASTOLIC BLOOD PRESSURE: 64 MMHG | SYSTOLIC BLOOD PRESSURE: 92 MMHG | HEART RATE: 75 BPM | WEIGHT: 146.6 LBS

## 2018-10-12 DIAGNOSIS — I48.19 PERSISTENT ATRIAL FIBRILLATION (HCC): ICD-10-CM

## 2018-10-12 LAB — INR PPP: 1.8 (ref 2–3.5)

## 2018-10-12 PROCEDURE — G0299 HHS/HOSPICE OF RN EA 15 MIN: HCPCS

## 2018-10-12 PROCEDURE — 665998 HH PPS REVENUE CREDIT

## 2018-10-12 PROCEDURE — 665999 HH PPS REVENUE DEBIT

## 2018-10-12 ASSESSMENT — ENCOUNTER SYMPTOMS
VOMITING: DENIES
RESPIRATORY SYMPTOMS COMMENTS: RESP. EVEN AND NONLABORED. NO DISTRESS.
POOR JUDGMENT: 1
NAUSEA: DENIES

## 2018-10-13 PROCEDURE — 665998 HH PPS REVENUE CREDIT

## 2018-10-13 PROCEDURE — 665999 HH PPS REVENUE DEBIT

## 2018-10-13 NOTE — PROGRESS NOTES
Anticoagulation Summary  As of 10/12/2018    INR goal:   2.0-3.0   TTR:   44.1 % (1.7 mo)   Today's INR:   1.8!   Warfarin maintenance plan:   3.75 mg (2.5 mg x 1.5) every day   Weekly warfarin total:   26.25 mg   Plan last modified:   Mj Chin, PharmD (9/6/2018)   Next INR check:   10/19/2018   Target end date:   Indefinite    Indications    Persistent atrial fibrillation (HCC) [I48.1]             Anticoagulation Episode Summary     INR check location:       Preferred lab:       Send INR reminders to:       Comments:   Call Pt's daughter Enrique [phone]       Anticoagulation Care Providers     Provider Role Specialty Phone number    Alonzo Flor M.D. Referring Cardiology [phone]    [facility] Anticoagulation Services Responsible  [phone]        Anticoagulation Patient Findings    Spoke to patient's daughter on the phone. Patients INR was subtherapeutic today at 1.8 .Patient denied any signs/symptoms of bleeding or bruising. Patient denied any recent changes to medications or diet. Patient denied any missed doses. Patient was instructed to bolus tonight with 5 mg, then return to regular regimen.    Follow up in 1 week(s).      Rigo Bradshaw.D

## 2018-10-14 PROCEDURE — 665998 HH PPS REVENUE CREDIT

## 2018-10-14 PROCEDURE — 665999 HH PPS REVENUE DEBIT

## 2018-10-15 ENCOUNTER — HOME CARE VISIT (OUTPATIENT)
Dept: HOME HEALTH SERVICES | Facility: HOME HEALTHCARE | Age: 83
End: 2018-10-15
Payer: MEDICARE

## 2018-10-15 VITALS
OXYGEN SATURATION: 92 % | RESPIRATION RATE: 18 BRPM | DIASTOLIC BLOOD PRESSURE: 64 MMHG | HEART RATE: 83 BPM | SYSTOLIC BLOOD PRESSURE: 126 MMHG | BODY MASS INDEX: 26.89 KG/M2 | WEIGHT: 147 LBS | TEMPERATURE: 98.4 F

## 2018-10-15 PROCEDURE — 665998 HH PPS REVENUE CREDIT

## 2018-10-15 PROCEDURE — G0152 HHCP-SERV OF OT,EA 15 MIN: HCPCS

## 2018-10-15 PROCEDURE — 665999 HH PPS REVENUE DEBIT

## 2018-10-15 ASSESSMENT — ENCOUNTER SYMPTOMS: DIFFICULTY THINKING: 1

## 2018-10-16 ENCOUNTER — HOME CARE VISIT (OUTPATIENT)
Dept: HOME HEALTH SERVICES | Facility: HOME HEALTHCARE | Age: 83
End: 2018-10-16
Payer: MEDICARE

## 2018-10-16 VITALS
TEMPERATURE: 98.6 F | DIASTOLIC BLOOD PRESSURE: 72 MMHG | SYSTOLIC BLOOD PRESSURE: 118 MMHG | HEART RATE: 83 BPM | RESPIRATION RATE: 19 BRPM | OXYGEN SATURATION: 96 %

## 2018-10-16 PROCEDURE — G0495 RN CARE TRAIN/EDU IN HH: HCPCS

## 2018-10-16 PROCEDURE — 665998 HH PPS REVENUE CREDIT

## 2018-10-16 PROCEDURE — 665999 HH PPS REVENUE DEBIT

## 2018-10-17 ENCOUNTER — HOME CARE VISIT (OUTPATIENT)
Dept: HOME HEALTH SERVICES | Facility: HOME HEALTHCARE | Age: 83
End: 2018-10-17
Payer: MEDICARE

## 2018-10-17 PROCEDURE — 665999 HH PPS REVENUE DEBIT

## 2018-10-17 PROCEDURE — 665998 HH PPS REVENUE CREDIT

## 2018-10-17 PROCEDURE — G0151 HHCP-SERV OF PT,EA 15 MIN: HCPCS

## 2018-10-18 VITALS
RESPIRATION RATE: 18 BRPM | SYSTOLIC BLOOD PRESSURE: 102 MMHG | DIASTOLIC BLOOD PRESSURE: 72 MMHG | HEART RATE: 81 BPM | OXYGEN SATURATION: 96 % | TEMPERATURE: 99.1 F

## 2018-10-18 PROCEDURE — 665998 HH PPS REVENUE CREDIT

## 2018-10-18 PROCEDURE — 665999 HH PPS REVENUE DEBIT

## 2018-10-19 ENCOUNTER — ANTICOAGULATION MONITORING (OUTPATIENT)
Dept: VASCULAR LAB | Facility: MEDICAL CENTER | Age: 83
End: 2018-10-19

## 2018-10-19 ENCOUNTER — HOME CARE VISIT (OUTPATIENT)
Dept: HOME HEALTH SERVICES | Facility: HOME HEALTHCARE | Age: 83
End: 2018-10-19
Payer: MEDICARE

## 2018-10-19 VITALS
OXYGEN SATURATION: 94 % | HEART RATE: 82 BPM | TEMPERATURE: 97.7 F | RESPIRATION RATE: 18 BRPM | DIASTOLIC BLOOD PRESSURE: 78 MMHG | SYSTOLIC BLOOD PRESSURE: 121 MMHG

## 2018-10-19 DIAGNOSIS — I48.19 PERSISTENT ATRIAL FIBRILLATION (HCC): ICD-10-CM

## 2018-10-19 DIAGNOSIS — Z79.01 CHRONIC ANTICOAGULATION: Primary | ICD-10-CM

## 2018-10-19 LAB — INR PPP: 2.4 (ref 2–3.5)

## 2018-10-19 PROCEDURE — G0299 HHS/HOSPICE OF RN EA 15 MIN: HCPCS

## 2018-10-19 PROCEDURE — 665998 HH PPS REVENUE CREDIT

## 2018-10-19 PROCEDURE — 665999 HH PPS REVENUE DEBIT

## 2018-10-19 ASSESSMENT — ENCOUNTER SYMPTOMS
VOMITING: DENIES
NAUSEA: DENIES

## 2018-10-20 PROCEDURE — 665999 HH PPS REVENUE DEBIT

## 2018-10-20 PROCEDURE — 665998 HH PPS REVENUE CREDIT

## 2018-10-20 NOTE — PROGRESS NOTES
Anticoagulation Summary  As of 10/19/2018    INR goal:   2.0-3.0   TTR:   46.8 % (1.9 mo)   Today's INR:   2.4   Warfarin maintenance plan:   3.75 mg (2.5 mg x 1.5) every day   Weekly warfarin total:   26.25 mg   Plan last modified:   Rigo MarionD (9/6/2018)   Next INR check:   10/26/2018   Target end date:   Indefinite    Indications    Persistent atrial fibrillation (HCC) [I48.1]             Anticoagulation Episode Summary     INR check location:       Preferred lab:       Send INR reminders to:       Comments:   Call Pt's daughter Enrique [phone]       Anticoagulation Care Providers     Provider Role Specialty Phone number    Alonzo Flor M.D. Referring Cardiology [phone]    [facility] Anticoagulation Services Responsible  [phone]        Anticoagulation Patient Findings  Patient Findings     Negatives:   Signs/symptoms of thrombosis, Signs/symptoms of bleeding, Laboratory test error suspected, Change in health, Change in alcohol use, Change in activity, Upcoming invasive procedure, Emergency department visit, Upcoming dental procedure, Missed doses, Extra doses, Change in medications, Change in diet/appetite, Hospital admission, Bruising, Other complaints        Spoke with patient today regarding therapeutic INR of 2.4.  Patient denies any signs/symptoms of bruising or bleeding or any changes in diet and medications.  Instructed patient to call clinic with any questions or concerns.  Pt is to continue with current warfarin dosing regimen.  Follow up in 1 weeks, to reduce risk of adverse events related to this high risk medication,  Warfarin.    Gomez Collier, PharmD

## 2018-10-21 PROCEDURE — 665998 HH PPS REVENUE CREDIT

## 2018-10-21 PROCEDURE — 665999 HH PPS REVENUE DEBIT

## 2018-10-22 ENCOUNTER — HOME CARE VISIT (OUTPATIENT)
Dept: HOME HEALTH SERVICES | Facility: HOME HEALTHCARE | Age: 83
End: 2018-10-22
Payer: MEDICARE

## 2018-10-22 ENCOUNTER — HOSPITAL ENCOUNTER (OUTPATIENT)
Dept: LAB | Facility: MEDICAL CENTER | Age: 83
End: 2018-10-22
Attending: INTERNAL MEDICINE
Payer: MEDICARE

## 2018-10-22 ENCOUNTER — OFFICE VISIT (OUTPATIENT)
Dept: CARDIOLOGY | Facility: MEDICAL CENTER | Age: 83
End: 2018-10-22
Payer: MEDICARE

## 2018-10-22 VITALS
HEIGHT: 62 IN | WEIGHT: 146 LBS | BODY MASS INDEX: 26.87 KG/M2 | DIASTOLIC BLOOD PRESSURE: 70 MMHG | SYSTOLIC BLOOD PRESSURE: 120 MMHG | HEART RATE: 90 BPM | OXYGEN SATURATION: 93 %

## 2018-10-22 VITALS
RESPIRATION RATE: 18 BRPM | OXYGEN SATURATION: 96 % | HEART RATE: 83 BPM | DIASTOLIC BLOOD PRESSURE: 60 MMHG | TEMPERATURE: 98.2 F | SYSTOLIC BLOOD PRESSURE: 102 MMHG

## 2018-10-22 DIAGNOSIS — R06.02 SOB (SHORTNESS OF BREATH): ICD-10-CM

## 2018-10-22 DIAGNOSIS — E78.2 MIXED HYPERLIPIDEMIA: ICD-10-CM

## 2018-10-22 DIAGNOSIS — Z95.5 STENTED CORONARY ARTERY: ICD-10-CM

## 2018-10-22 DIAGNOSIS — I34.0 NON-RHEUMATIC MITRAL REGURGITATION: ICD-10-CM

## 2018-10-22 DIAGNOSIS — N18.30 CKD (CHRONIC KIDNEY DISEASE) STAGE 3, GFR 30-59 ML/MIN (HCC): ICD-10-CM

## 2018-10-22 DIAGNOSIS — F02.80 LATE ONSET ALZHEIMER'S DISEASE WITHOUT BEHAVIORAL DISTURBANCE (HCC): ICD-10-CM

## 2018-10-22 DIAGNOSIS — E87.6 HYPOKALEMIA: ICD-10-CM

## 2018-10-22 DIAGNOSIS — I48.19 PERSISTENT ATRIAL FIBRILLATION (HCC): ICD-10-CM

## 2018-10-22 DIAGNOSIS — I25.10 CORONARY ARTERIOSCLEROSIS: ICD-10-CM

## 2018-10-22 DIAGNOSIS — R60.0 LOCALIZED EDEMA: ICD-10-CM

## 2018-10-22 DIAGNOSIS — G30.1 LATE ONSET ALZHEIMER'S DISEASE WITHOUT BEHAVIORAL DISTURBANCE (HCC): ICD-10-CM

## 2018-10-22 DIAGNOSIS — D69.6 THROMBOCYTOPENIA (HCC): ICD-10-CM

## 2018-10-22 DIAGNOSIS — I10 BENIGN ESSENTIAL HYPERTENSION: ICD-10-CM

## 2018-10-22 LAB
ANION GAP SERPL CALC-SCNC: 17 MMOL/L (ref 0–11.9)
BUN SERPL-MCNC: 42 MG/DL (ref 8–22)
CALCIUM SERPL-MCNC: 9.8 MG/DL (ref 8.5–10.5)
CHLORIDE SERPL-SCNC: 104 MMOL/L (ref 96–112)
CO2 SERPL-SCNC: 17 MMOL/L (ref 20–33)
CREAT SERPL-MCNC: 1.54 MG/DL (ref 0.5–1.4)
FASTING STATUS PATIENT QL REPORTED: NORMAL
GLUCOSE SERPL-MCNC: 110 MG/DL (ref 65–99)
POTASSIUM SERPL-SCNC: 3.9 MMOL/L (ref 3.6–5.5)
SODIUM SERPL-SCNC: 138 MMOL/L (ref 135–145)

## 2018-10-22 PROCEDURE — 665999 HH PPS REVENUE DEBIT

## 2018-10-22 PROCEDURE — G0152 HHCP-SERV OF OT,EA 15 MIN: HCPCS

## 2018-10-22 PROCEDURE — 36415 COLL VENOUS BLD VENIPUNCTURE: CPT

## 2018-10-22 PROCEDURE — 665998 HH PPS REVENUE CREDIT

## 2018-10-22 PROCEDURE — 80048 BASIC METABOLIC PNL TOTAL CA: CPT

## 2018-10-22 PROCEDURE — 99214 OFFICE O/P EST MOD 30 MIN: CPT | Performed by: INTERNAL MEDICINE

## 2018-10-22 RX ORDER — METOPROLOL SUCCINATE 25 MG/1
50 TABLET, EXTENDED RELEASE ORAL DAILY
Qty: 90 TAB | Refills: 3 | Status: SHIPPED | OUTPATIENT
Start: 2018-10-22

## 2018-10-22 ASSESSMENT — ENCOUNTER SYMPTOMS
LOSS OF CONSCIOUSNESS: 0
RESPIRATORY NEGATIVE: 1
WHEEZING: 0
HEMOPTYSIS: 0
CLAUDICATION: 0
PALPITATIONS: 0
SORE THROAT: 0
STRIDOR: 0
COUGH: 0
WEAKNESS: 0
BRUISES/BLEEDS EASILY: 0
CHILLS: 0
ORTHOPNEA: 0
EYES NEGATIVE: 1
FEVER: 0
CARDIOVASCULAR NEGATIVE: 1
GASTROINTESTINAL NEGATIVE: 1
NEUROLOGICAL NEGATIVE: 1
DIFFICULTY THINKING: 1
SPUTUM PRODUCTION: 0
DIZZINESS: 0
MUSCULOSKELETAL NEGATIVE: 1
PND: 0
CONSTITUTIONAL NEGATIVE: 1
SHORTNESS OF BREATH: 0

## 2018-10-22 NOTE — PROGRESS NOTES
Chief Complaint   Patient presents with   • Coronary Artery Disease     F/V: 1 MO       Subjective:   Arianne Irving is a 86 y.o. female who presents today as a follow-up for her atrial fibrillation high blood pressure and lower extremity edema.  After we started her torsemide she ended up immediately in the hospital for abdominal pain and UTI.  She was told not to start her torsemide.  She continues on the low-dose hydrochlorthiazide.  She is having good blood pressure controlled no lower extremity edema.  Her lipids are currently at goal.  She is not have any chest pain palpitations or PND.    Past Medical History:   Diagnosis Date   • CAD (coronary artery disease) May 2002    PCI/stent (Penta 3.0 x 28mm) to the LAD.   • CHF (congestive heart failure) ([facility]) 7/30/2018   • Dementia    • Edema     • HLD (hyperlipidemia)    • HTN (hypertension)    • Obesity     • Psychiatric disorder     dementia   • Renal disorder     simple cist   • UTI (urinary tract infection) 9/25/2018     Past Surgical History:   Procedure Laterality Date   • GYN SURGERY      hysterectomy   • HYSTERECTOMY, TOTAL ABDOMINAL     • KNEE ARTHROSCOPY     • ROTATOR CUFF REPAIR     • TONSILLECTOMY AND ADENOIDECTOMY       Family History   Problem Relation Age of Onset   • Heart Disease Brother    • Heart Disease Mother    • Heart Failure Mother    • Heart Attack Father      Social History     Social History   • Marital status:      Spouse name: N/A   • Number of children: N/A   • Years of education: N/A     Occupational History   • Not on file.     Social History Main Topics   • Smoking status: Former Smoker     Types: Cigarettes     Quit date: 1/1/1967   • Smokeless tobacco: Never Used   • Alcohol use No   • Drug use: No   • Sexual activity: Not on file     Other Topics Concern   • Not on file     Social History Narrative   • No narrative on file     Allergies   Allergen Reactions   • Nkda [No Known Drug Allergy]      Outpatient Encounter  Prescriptions as of 10/22/2018   Medication Sig Dispense Refill   • metoprolol SR (TOPROL XL) 25 MG TABLET SR 24 HR Take 2 Tabs by mouth every day. 90 Tab 3   • warfarin (COUMADIN) 2.5 MG Tab Take 3.75 mg by mouth every day.     • Magnesium Oxide (MAGOX 400 PO) Take 1 tablet by mouth every day.     • sertraline (ZOLOFT) 50 MG Tab Take 50 mg by mouth every bedtime.     • losartan-hydrochlorothiazide (HYZAAR) 100-12.5 MG per tablet TAKE ONE TABLET BY MOUTH ONCE DAILY 90 Tab 3   • potassium Chloride ER (K-TAB) 20 MEQ Tab CR tablet TAKE ONE TABLET BY MOUTH ONCE DAILY 90 Tab 3   • Nisoldipine 17 MG TABLET SR 24 HR TAKE 1 TABLET BY MOUTH EVERY DAY 90 Tab 3   • atorvastatin (LIPITOR) 20 MG Tab TAKE ONE TABLET BY MOUTH IN THE EVENING 90 Tab 3   • trazodone (DESYREL) 50 MG Tab Take 50 mg by mouth every bedtime.     • rivastigmine (EXELON) 9.5 MG/24HR patch Apply 1 Patch to skin as directed every day.     • esomeprazole (NEXIUM) 40 MG capsule Take 40 mg by mouth every morning before breakfast.     • sulfamethoxazole-trimethoprim (BACTRIM DS) 800-160 MG tablet Take 1 Tab by mouth 2 times a day. (Patient not taking: Reported on 10/12/2018) 6 Tab 0   • [DISCONTINUED] metoprolol SR (TOPROL XL) 25 MG TABLET SR 24 HR Take 50 mg by mouth every day.       No facility-administered encounter medications on file as of 10/22/2018.      Review of Systems   Constitutional: Negative.  Negative for chills, fever and malaise/fatigue.   HENT: Negative.  Negative for sore throat.    Eyes: Negative.    Respiratory: Negative.  Negative for cough, hemoptysis, sputum production, shortness of breath, wheezing and stridor.    Cardiovascular: Negative.  Negative for chest pain, palpitations, orthopnea, claudication, leg swelling and PND.   Gastrointestinal: Negative.    Genitourinary: Negative.    Musculoskeletal: Negative.    Skin: Negative.    Neurological: Negative.  Negative for dizziness, loss of consciousness and weakness.   Endo/Heme/Allergies:  "Negative.  Does not bruise/bleed easily.   All other systems reviewed and are negative.       Objective:   /70 (BP Location: Right arm, Patient Position: Sitting, BP Cuff Size: Adult)   Pulse 90   Ht 1.575 m (5' 2\")   Wt 66.2 kg (146 lb)   SpO2 93%   BMI 26.70 kg/m²     Physical Exam   Constitutional: She appears well-developed and well-nourished. No distress.   HENT:   Head: Normocephalic and atraumatic.   Right Ear: External ear normal.   Left Ear: External ear normal.   Nose: Nose normal.   Mouth/Throat: No oropharyngeal exudate.   Eyes: Pupils are equal, round, and reactive to light. Conjunctivae and EOM are normal. Right eye exhibits no discharge. Left eye exhibits no discharge. No scleral icterus.   Neck: Neck supple. No JVD present.   Cardiovascular: Normal rate, regular rhythm and intact distal pulses.  Exam reveals no gallop and no friction rub.    No murmur heard.  Pulmonary/Chest: Effort normal. No stridor. No respiratory distress. She has no wheezes. She has no rales. She exhibits no tenderness.   Abdominal: Soft. She exhibits no distension. There is no guarding.   Musculoskeletal: Normal range of motion. She exhibits no edema, tenderness or deformity.   Neurological: She is alert. She has normal reflexes. She displays normal reflexes. No cranial nerve deficit. She exhibits normal muscle tone. Coordination normal.   Skin: Skin is warm and dry. No rash noted. She is not diaphoretic. No erythema. No pallor.   Psychiatric: She has a normal mood and affect. Her behavior is normal. Judgment and thought content normal.   Nursing note and vitals reviewed.      Assessment:     1. Hypokalemia  BASIC METABOLIC PANEL   2. Localized edema     3. Coronary arteriosclerosis     4. CKD (chronic kidney disease) stage 3, GFR 30-59 ml/min ([facility])     5. Benign essential hypertension     6. Late onset Alzheimer's disease without behavioral disturbance     7. Non-rheumatic mitral regurgitation     8. Mixed " hyperlipidemia     9. Persistent atrial fibrillation (HCC)  BASIC METABOLIC PANEL    metoprolol SR (TOPROL XL) 25 MG TABLET SR 24 HR   10. SOB (shortness of breath)  BASIC METABOLIC PANEL   11. Stented coronary artery     12. Thrombocytopenia (HCC)         Medical Decision Making:  Today's Assessment / Status / Plan:     86-year-old female with some PND and a residual cough which is very mild at best.  Her lipids are at goal for CAD.  I refilled her metoprolol for her atrial fibrillation.  I have given her a lab slip to follow-up on her hypokalemia that was noted during the hospital.  We will see her back in 6 months.    Thank for you allowing me to take part in your patient's care, please call should you have any questions or would like to discuss this patient.

## 2018-10-22 NOTE — LETTER
[facility]   [address], [address]  [place] [address]  Phone: [phone]  Fax: [phone]              Arianne Irving  11/3/1931    Encounter Date: 10/22/2018    Alonzo Flor M.D.          PROGRESS NOTE:  Chief Complaint   Patient presents with   • Coronary Artery Disease     F/V: 1 MO       Subjective:   Arianne Irving is a 86 y.o. female who presents today as a follow-up for her atrial fibrillation high blood pressure and lower extremity edema.  After we started her torsemide she ended up immediately in the hospital for abdominal pain and UTI.  She was told not to start her torsemide.  She continues on the low-dose hydrochlorthiazide.  She is having good blood pressure controlled no lower extremity edema.  Her lipids are currently at goal.  She is not have any chest pain palpitations or PND.    Past Medical History:   Diagnosis Date   • CAD (coronary artery disease) May 2002    PCI/stent (Penta 3.0 x 28mm) to the LAD.   • CHF (congestive heart failure) (HCC) 7/30/2018   • Dementia    • Edema     • HLD (hyperlipidemia)    • HTN (hypertension)    • Obesity     • Psychiatric disorder     dementia   • Renal disorder     simple cist   • UTI (urinary tract infection) 9/25/2018     Past Surgical History:   Procedure Laterality Date   • GYN SURGERY      hysterectomy   • HYSTERECTOMY, TOTAL ABDOMINAL     • KNEE ARTHROSCOPY     • ROTATOR CUFF REPAIR     • TONSILLECTOMY AND ADENOIDECTOMY       Family History   Problem Relation Age of Onset   • Heart Disease Brother    • Heart Disease Mother    • Heart Failure Mother    • Heart Attack Father      Social History     Social History   • Marital status:      Spouse name: N/A   • Number of children: N/A   • Years of education: N/A     Occupational History   • Not on file.     Social History Main Topics   • Smoking status: Former Smoker     Types: Cigarettes     Quit date: 1/1/1967   • Smokeless tobacco: Never  Used   • Alcohol use No   • Drug use: No   • Sexual activity: Not on file     Other Topics Concern   • Not on file     Social History Narrative   • No narrative on file     Allergies   Allergen Reactions   • Nkda [No Known Drug Allergy]      Outpatient Encounter Prescriptions as of 10/22/2018   Medication Sig Dispense Refill   • metoprolol SR (TOPROL XL) 25 MG TABLET SR 24 HR Take 2 Tabs by mouth every day. 90 Tab 3   • warfarin (COUMADIN) 2.5 MG Tab Take 3.75 mg by mouth every day.     • Magnesium Oxide (MAGOX 400 PO) Take 1 tablet by mouth every day.     • sertraline (ZOLOFT) 50 MG Tab Take 50 mg by mouth every bedtime.     • losartan-hydrochlorothiazide (HYZAAR) 100-12.5 MG per tablet TAKE ONE TABLET BY MOUTH ONCE DAILY 90 Tab 3   • potassium Chloride ER (K-TAB) 20 MEQ Tab CR tablet TAKE ONE TABLET BY MOUTH ONCE DAILY 90 Tab 3   • Nisoldipine 17 MG TABLET SR 24 HR TAKE 1 TABLET BY MOUTH EVERY DAY 90 Tab 3   • atorvastatin (LIPITOR) 20 MG Tab TAKE ONE TABLET BY MOUTH IN THE EVENING 90 Tab 3   • trazodone (DESYREL) 50 MG Tab Take 50 mg by mouth every bedtime.     • rivastigmine (EXELON) 9.5 MG/24HR patch Apply 1 Patch to skin as directed every day.     • esomeprazole (NEXIUM) 40 MG capsule Take 40 mg by mouth every morning before breakfast.     • sulfamethoxazole-trimethoprim (BACTRIM DS) 800-160 MG tablet Take 1 Tab by mouth 2 times a day. (Patient not taking: Reported on 10/12/2018) 6 Tab 0   • [DISCONTINUED] metoprolol SR (TOPROL XL) 25 MG TABLET SR 24 HR Take 50 mg by mouth every day.       No facility-administered encounter medications on file as of 10/22/2018.      Review of Systems   Constitutional: Negative.  Negative for chills, fever and malaise/fatigue.   HENT: Negative.  Negative for sore throat.    Eyes: Negative.    Respiratory: Negative.  Negative for cough, hemoptysis, sputum production, shortness of breath, wheezing and stridor.    Cardiovascular: Negative.  Negative for chest pain, palpitations,  "orthopnea, claudication, leg swelling and PND.   Gastrointestinal: Negative.    Genitourinary: Negative.    Musculoskeletal: Negative.    Skin: Negative.    Neurological: Negative.  Negative for dizziness, loss of consciousness and weakness.   Endo/Heme/Allergies: Negative.  Does not bruise/bleed easily.   All other systems reviewed and are negative.       Objective:   /70 (BP Location: Right arm, Patient Position: Sitting, BP Cuff Size: Adult)   Pulse 90   Ht 1.575 m (5' 2\")   Wt 66.2 kg (146 lb)   SpO2 93%   BMI 26.70 kg/m²      Physical Exam   Constitutional: She appears well-developed and well-nourished. No distress.   HENT:   Head: Normocephalic and atraumatic.   Right Ear: External ear normal.   Left Ear: External ear normal.   Nose: Nose normal.   Mouth/Throat: No oropharyngeal exudate.   Eyes: Pupils are equal, round, and reactive to light. Conjunctivae and EOM are normal. Right eye exhibits no discharge. Left eye exhibits no discharge. No scleral icterus.   Neck: Neck supple. No JVD present.   Cardiovascular: Normal rate, regular rhythm and intact distal pulses.  Exam reveals no gallop and no friction rub.    No murmur heard.  Pulmonary/Chest: Effort normal. No stridor. No respiratory distress. She has no wheezes. She has no rales. She exhibits no tenderness.   Abdominal: Soft. She exhibits no distension. There is no guarding.   Musculoskeletal: Normal range of motion. She exhibits no edema, tenderness or deformity.   Neurological: She is alert. She has normal reflexes. She displays normal reflexes. No cranial nerve deficit. She exhibits normal muscle tone. Coordination normal.   Skin: Skin is warm and dry. No rash noted. She is not diaphoretic. No erythema. No pallor.   Psychiatric: She has a normal mood and affect. Her behavior is normal. Judgment and thought content normal.   Nursing note and vitals reviewed.      Assessment:     1. Hypokalemia  BASIC METABOLIC PANEL   2. Localized edema     "   3. Coronary arteriosclerosis     4. CKD (chronic kidney disease) stage 3, GFR 30-59 ml/min ([facility])     5. Benign essential hypertension     6. Late onset Alzheimer's disease without behavioral disturbance     7. Non-rheumatic mitral regurgitation     8. Mixed hyperlipidemia     9. Persistent atrial fibrillation (HCC)  BASIC METABOLIC PANEL    metoprolol SR (TOPROL XL) 25 MG TABLET SR 24 HR   10. SOB (shortness of breath)  BASIC METABOLIC PANEL   11. Stented coronary artery     12. Thrombocytopenia (HCC)         Medical Decision Making:  Today's Assessment / Status / Plan:     86-year-old female with some PND and a residual cough which is very mild at best.  Her lipids are at goal for CAD.  I refilled her metoprolol for her atrial fibrillation.  I have given her a lab slip to follow-up on her hypokalemia that was noted during the hospital.  We will see her back in 6 months.    Thank for you allowing me to take part in your patient's care, please call should you have any questions or would like to discuss this patient.      Elli Dickerson M.D.  [address]  [address] [address]  VIA Facsimile: [phone]

## 2018-10-23 PROCEDURE — 665998 HH PPS REVENUE CREDIT

## 2018-10-23 PROCEDURE — 665999 HH PPS REVENUE DEBIT

## 2018-10-24 ENCOUNTER — HOME CARE VISIT (OUTPATIENT)
Dept: HOME HEALTH SERVICES | Facility: HOME HEALTHCARE | Age: 83
End: 2018-10-24
Payer: MEDICARE

## 2018-10-24 PROCEDURE — 665999 HH PPS REVENUE DEBIT

## 2018-10-24 PROCEDURE — G0151 HHCP-SERV OF PT,EA 15 MIN: HCPCS

## 2018-10-24 PROCEDURE — 665998 HH PPS REVENUE CREDIT

## 2018-10-25 VITALS
OXYGEN SATURATION: 99 % | SYSTOLIC BLOOD PRESSURE: 104 MMHG | HEART RATE: 72 BPM | TEMPERATURE: 97.8 F | RESPIRATION RATE: 18 BRPM | DIASTOLIC BLOOD PRESSURE: 60 MMHG

## 2018-10-25 PROCEDURE — 665998 HH PPS REVENUE CREDIT

## 2018-10-25 PROCEDURE — 665999 HH PPS REVENUE DEBIT

## 2018-10-25 SDOH — ECONOMIC STABILITY: HOUSING INSECURITY: HOME SAFETY: PT LIVES WITH FAMILY AND THEY ARE THERE FOR HELP 24/7

## 2018-10-25 SDOH — ECONOMIC STABILITY: HOUSING INSECURITY: UNSAFE COOKING RANGE AREA: 0

## 2018-10-25 SDOH — ECONOMIC STABILITY: HOUSING INSECURITY: UNSAFE APPLIANCES: 0

## 2018-10-25 ASSESSMENT — BALANCE ASSESSMENTS
NUDGED: 2
SITTING DOWN: 1
STANDING BALANCE: 2
SITTING BALANCE: 1
ARISES: 1
IMMEDIATE STANDING BALANCE FIRST 5 SECONDS: 2
SURVEY COMPLETE: TRUE
ATTEMPTS TO ARISE: 2
BALANCE SCORE: 14
TURNING 360 DEGREES STEADINESS: 1
EYES CLOSED AT MAXIMUM POSITION NUDGED: 1
TURNING 360 DEGREES STEPS: 1

## 2018-10-25 ASSESSMENT — GAIT ASSESSMENTS
STEP SYMMETRY: 1
TIME: 0
PATH: 1
RIGHT STEP CLEAR: 1
GAIT SCORE: 9
LEFT STEP CLEAR: 1
TRUNK: 1
SURVEY COMPLETE: TRUE
BALANCE AND GAIT SCORE: 23
LEFT STEP PASS: 1
INITIATION OF GAIT IMMEDIATELY AFTER GO: 1
STEP CONTINUITY: 1
RIGHT STEP PASS: 1

## 2018-10-26 ENCOUNTER — HOME CARE VISIT (OUTPATIENT)
Dept: HOME HEALTH SERVICES | Facility: HOME HEALTHCARE | Age: 83
End: 2018-10-26
Payer: MEDICARE

## 2018-10-26 VITALS
DIASTOLIC BLOOD PRESSURE: 83 MMHG | RESPIRATION RATE: 17 BRPM | SYSTOLIC BLOOD PRESSURE: 121 MMHG | WEIGHT: 147.2 LBS | OXYGEN SATURATION: 94 % | BODY MASS INDEX: 26.92 KG/M2 | HEART RATE: 76 BPM | TEMPERATURE: 98.6 F

## 2018-10-26 LAB — INR PPP: 2.4 (ref 2–3.5)

## 2018-10-26 PROCEDURE — G0299 HHS/HOSPICE OF RN EA 15 MIN: HCPCS

## 2018-10-26 PROCEDURE — 665999 HH PPS REVENUE DEBIT

## 2018-10-26 PROCEDURE — 665998 HH PPS REVENUE CREDIT

## 2018-10-26 ASSESSMENT — ENCOUNTER SYMPTOMS
POOR JUDGMENT: 1
VOMITING: DENIES
NAUSEA: DENIES

## 2018-10-27 PROCEDURE — 665998 HH PPS REVENUE CREDIT

## 2018-10-27 PROCEDURE — 665999 HH PPS REVENUE DEBIT

## 2018-10-28 PROCEDURE — 665999 HH PPS REVENUE DEBIT

## 2018-10-28 PROCEDURE — 665998 HH PPS REVENUE CREDIT

## 2018-10-29 ENCOUNTER — ANTICOAGULATION MONITORING (OUTPATIENT)
Dept: VASCULAR LAB | Facility: MEDICAL CENTER | Age: 83
End: 2018-10-29

## 2018-10-29 DIAGNOSIS — I48.19 PERSISTENT ATRIAL FIBRILLATION (HCC): ICD-10-CM

## 2018-10-29 PROCEDURE — 665998 HH PPS REVENUE CREDIT

## 2018-10-29 PROCEDURE — 665999 HH PPS REVENUE DEBIT

## 2018-10-29 NOTE — PROGRESS NOTES
Anticoagulation Summary  As of 10/29/2018    INR goal:   2.0-3.0   TTR:   52.5 % (2.2 mo)   Today's INR:   2.4 (10/26/2018)   Warfarin maintenance plan:   3.75 mg (2.5 mg x 1.5) every day   Weekly warfarin total:   26.25 mg   Plan last modified:   Mj Chin, PharmD (9/6/2018)   Next INR check:   11/1/2018   Target end date:   Indefinite    Indications    Persistent atrial fibrillation (HCC) [I48.1]             Anticoagulation Episode Summary     INR check location:       Preferred lab:       Send INR reminders to:       Comments:   Call Pt's daughter Enrique [phone]       Anticoagulation Care Providers     Provider Role Specialty Phone number    Alonzo Flor M.D. Referring Cardiology [phone]    [facility]ticoagulation [facility] Responsible  [phone]        Anticoagulation Patient Findings     Left voicemail message to report a therapeutic INR of 2.4.  Pt to continue with current warfarin dosing regimen. Requested pt contact the clinic for any s/s of unusual bleeding, bruising, clotting or any changes to diet or medication. FU 1 weeks.  Gomez Collier, RigoD

## 2018-10-30 PROCEDURE — 665999 HH PPS REVENUE DEBIT

## 2018-10-30 PROCEDURE — 665998 HH PPS REVENUE CREDIT

## 2018-10-31 PROCEDURE — 665999 HH PPS REVENUE DEBIT

## 2018-10-31 PROCEDURE — 665998 HH PPS REVENUE CREDIT

## 2018-11-01 ENCOUNTER — HOME CARE VISIT (OUTPATIENT)
Dept: HOME HEALTH SERVICES | Facility: HOME HEALTHCARE | Age: 83
End: 2018-11-01
Payer: MEDICARE

## 2018-11-01 ENCOUNTER — ANTICOAGULATION MONITORING (OUTPATIENT)
Dept: VASCULAR LAB | Facility: MEDICAL CENTER | Age: 83
End: 2018-11-01

## 2018-11-01 VITALS
HEART RATE: 71 BPM | RESPIRATION RATE: 18 BRPM | TEMPERATURE: 98.7 F | SYSTOLIC BLOOD PRESSURE: 112 MMHG | OXYGEN SATURATION: 98 % | DIASTOLIC BLOOD PRESSURE: 66 MMHG

## 2018-11-01 DIAGNOSIS — I48.19 PERSISTENT ATRIAL FIBRILLATION (HCC): ICD-10-CM

## 2018-11-01 LAB — INR PPP: 3 (ref 2–3.5)

## 2018-11-01 PROCEDURE — 665998 HH PPS REVENUE CREDIT

## 2018-11-01 PROCEDURE — 665999 HH PPS REVENUE DEBIT

## 2018-11-01 PROCEDURE — G0493 RN CARE EA 15 MIN HH/HOSPICE: HCPCS

## 2018-11-01 ASSESSMENT — ACTIVITIES OF DAILY LIVING (ADL): HOME_HEALTH_OASIS: 01

## 2018-11-01 NOTE — PROGRESS NOTES
Anticoagulation Summary  As of 11/1/2018    INR goal:   2.0-3.0   TTR:   56.5 % (2.4 mo)   Today's INR:   3.0   Warfarin maintenance plan:   3.75 mg (2.5 mg x 1.5) every day   Weekly warfarin total:   26.25 mg   Plan last modified:   Mj Chin, PharmD (9/6/2018)   Next INR check:   11/16/2018   Target end date:   Indefinite    Indications    Persistent atrial fibrillation (HCC) [I48.1]             Anticoagulation Episode Summary     INR check location:       Preferred lab:       Send INR reminders to:       Comments:   Call Pt's daughter Enrique [phone]       Anticoagulation Care Providers     Provider Role Specialty Phone number    Alonzo Flor M.D. Referring Cardiology [phone]    [facility] Anticoagulation Services Responsible  [phone]        Anticoagulation Patient Findings      Spoke with patient to report a therapeutic INR.    Pt instructed to continue with current warfarin dosing regimen, confirms dosing.   Pt denies any s/s of bleeding, bruising, clotting or any changes to diet or medication.    Will follow up in 2 week(s) at lab.     Eleanor Ibarra, PharmD

## 2018-11-02 PROCEDURE — 665998 HH PPS REVENUE CREDIT

## 2018-11-02 PROCEDURE — 665999 HH PPS REVENUE DEBIT

## 2018-11-02 ASSESSMENT — ACTIVITIES OF DAILY LIVING (ADL): OASIS_M1830: 02

## 2018-11-03 PROCEDURE — 665999 HH PPS REVENUE DEBIT

## 2018-11-03 PROCEDURE — 665998 HH PPS REVENUE CREDIT

## 2018-11-04 PROCEDURE — 665998 HH PPS REVENUE CREDIT

## 2018-11-04 PROCEDURE — 665999 HH PPS REVENUE DEBIT

## 2018-11-05 PROCEDURE — 665998 HH PPS REVENUE CREDIT

## 2018-11-05 PROCEDURE — 665999 HH PPS REVENUE DEBIT

## 2018-11-06 PROCEDURE — 665999 HH PPS REVENUE DEBIT

## 2018-11-06 PROCEDURE — 665998 HH PPS REVENUE CREDIT

## 2018-11-07 PROCEDURE — 665998 HH PPS REVENUE CREDIT

## 2018-11-07 PROCEDURE — 665999 HH PPS REVENUE DEBIT

## 2018-11-08 PROCEDURE — 665998 HH PPS REVENUE CREDIT

## 2018-11-08 PROCEDURE — 665999 HH PPS REVENUE DEBIT

## 2018-11-09 PROCEDURE — 665998 HH PPS REVENUE CREDIT

## 2018-11-09 PROCEDURE — 665999 HH PPS REVENUE DEBIT

## 2018-11-10 PROCEDURE — 665998 HH PPS REVENUE CREDIT

## 2018-11-10 PROCEDURE — 665999 HH PPS REVENUE DEBIT

## 2018-11-11 PROCEDURE — 665999 HH PPS REVENUE DEBIT

## 2018-11-11 PROCEDURE — 665998 HH PPS REVENUE CREDIT

## 2018-11-12 PROCEDURE — 665998 HH PPS REVENUE CREDIT

## 2018-11-12 PROCEDURE — 665999 HH PPS REVENUE DEBIT

## 2018-11-13 PROCEDURE — 665998 HH PPS REVENUE CREDIT

## 2018-11-13 PROCEDURE — 665999 HH PPS REVENUE DEBIT

## 2018-11-14 PROCEDURE — 665999 HH PPS REVENUE DEBIT

## 2018-11-14 PROCEDURE — 665998 HH PPS REVENUE CREDIT

## 2018-11-15 PROCEDURE — 665999 HH PPS REVENUE DEBIT

## 2018-11-15 PROCEDURE — 665998 HH PPS REVENUE CREDIT

## 2018-11-16 ENCOUNTER — ANTICOAGULATION VISIT (OUTPATIENT)
Dept: MEDICAL GROUP | Facility: PHYSICIAN GROUP | Age: 83
End: 2018-11-16
Payer: MEDICARE

## 2018-11-16 VITALS — SYSTOLIC BLOOD PRESSURE: 102 MMHG | DIASTOLIC BLOOD PRESSURE: 74 MMHG | HEART RATE: 83 BPM

## 2018-11-16 DIAGNOSIS — I48.19 PERSISTENT ATRIAL FIBRILLATION (HCC): ICD-10-CM

## 2018-11-16 LAB — INR PPP: 3.3 (ref 2–3.5)

## 2018-11-16 PROCEDURE — 665999 HH PPS REVENUE DEBIT

## 2018-11-16 PROCEDURE — 99211 OFF/OP EST MAY X REQ PHY/QHP: CPT | Performed by: FAMILY MEDICINE

## 2018-11-16 PROCEDURE — 665998 HH PPS REVENUE CREDIT

## 2018-11-16 PROCEDURE — 85610 PROTHROMBIN TIME: CPT | Performed by: FAMILY MEDICINE

## 2018-11-16 RX ORDER — WARFARIN SODIUM 2.5 MG/1
TABLET ORAL
Qty: 135 TAB | Refills: 1 | Status: SHIPPED | OUTPATIENT
Start: 2018-11-16

## 2018-11-16 NOTE — PROGRESS NOTES
"Anticoagulation Summary  As of 11/16/2018    INR goal:   2.0-3.0   TTR:   56.5 % (2.4 mo)   Today's INR:      Warfarin maintenance plan:   3.75 mg (2.5 mg x 1.5) every day   Weekly warfarin total:   26.25 mg   Plan last modified:   Mj Chin, PharmD (9/6/2018)   Next INR check:      Target end date:   Indefinite    Indications    Persistent atrial fibrillation (HCC) [I48.1]             Anticoagulation Episode Summary     INR check location:       Preferred lab:       Send INR reminders to:       Comments:   Call Pt's daughter Enrique [phone]       Anticoagulation Care Providers     Provider Role Specialty Phone number    Alonzo Flor M.D. Referring Cardiology [phone]    [facility] Anticoagulation Services Responsible  [phone]        Anticoagulation Patient Findings    HPI:   Arianne Irving seen in clinic today, on anticoagulation therapy with warfarin for stroke prevention due to history of atrial fibrillation.    Patient's previous INR was therapeutic at 3.0 on 11-1-18, at which time patient was instructed to continue with current warfarin regimen.  She returns to clinic today to recheck INR to ensure it is therapeutic and thus preventing possible clotting and/or bleeding/bruising complications.    CHADS-VASc = at least 4  (unadjusted ischemic stroke risk/year:  4.8%, which is moderate risk)    Does patient have any changes to current medical/health status since last appt (Y/N):  NO  Does patient have any signs/symptoms of bleeding and/or thrombosis since the last appt (Y/N):  NO  Does patient have any interval changes to diet or medications since last appt (Y/N):  NO  Are there any complications or cost restrictions with current therapy (Y/N):  NO      Vitals:  /74  HR 83    Weight  declines   Height   5' 2\"     Asssessment:      INR supratherapeutic at 3.3, therefore increasing patient's risk of bleeding complications.   Reason(s) for out of range INR today:  No identifiable causes for " high INR.      Plan:  Instructed patient to decrease today's dose to 2.5mg, then resume current warfarin regimen in order to bring INR to therapeutic range.     Follow up:  Because warfarin is a high risk medication and current CHEST guidelines recommend regular monitoring intervals (few days up to 12 weeks), will have patient return to clinic in 2 weeks to recheck INR.    Gomez Collier, PharmD

## 2018-11-17 PROCEDURE — 665999 HH PPS REVENUE DEBIT

## 2018-11-17 PROCEDURE — 665998 HH PPS REVENUE CREDIT

## 2018-11-18 PROCEDURE — 665998 HH PPS REVENUE CREDIT

## 2018-11-18 PROCEDURE — 665999 HH PPS REVENUE DEBIT

## 2018-11-19 PROCEDURE — 665998 HH PPS REVENUE CREDIT

## 2018-11-19 PROCEDURE — 665999 HH PPS REVENUE DEBIT

## 2018-11-20 PROCEDURE — 665999 HH PPS REVENUE DEBIT

## 2018-11-20 PROCEDURE — 665998 HH PPS REVENUE CREDIT

## 2018-11-30 ENCOUNTER — ANTICOAGULATION VISIT (OUTPATIENT)
Dept: MEDICAL GROUP | Facility: PHYSICIAN GROUP | Age: 83
End: 2018-11-30
Payer: MEDICARE

## 2018-11-30 VITALS — HEART RATE: 89 BPM | SYSTOLIC BLOOD PRESSURE: 121 MMHG | DIASTOLIC BLOOD PRESSURE: 82 MMHG

## 2018-11-30 DIAGNOSIS — I48.19 PERSISTENT ATRIAL FIBRILLATION (HCC): ICD-10-CM

## 2018-11-30 LAB — INR PPP: 2.3 (ref 2–3.5)

## 2018-11-30 PROCEDURE — 85610 PROTHROMBIN TIME: CPT | Performed by: FAMILY MEDICINE

## 2018-11-30 PROCEDURE — 99999 PR NO CHARGE: CPT | Performed by: FAMILY MEDICINE

## 2018-11-30 NOTE — PROGRESS NOTES
Anticoagulation Summary  As of 11/30/2018    INR goal:   2.0-3.0   TTR:   49.6 % (3.3 mo)   Today's INR:   2.3   Warfarin maintenance plan:   3.75 mg (2.5 mg x 1.5) every day   Weekly warfarin total:   26.25 mg   Plan last modified:   Mj Chin, PharmD (9/6/2018)   Next INR check:   12/14/2018   Target end date:   Indefinite    Indications    Persistent atrial fibrillation (HCC) [I48.1]             Anticoagulation Episode Summary     INR check location:       Preferred lab:       Send INR reminders to:       Comments:   Call Pt's daughter Enrique [phone]       Anticoagulation Care Providers     Provider Role Specialty Phone number    Alonzo Flor M.D. Referring Cardiology [phone]    [facility] Responsible  [phone]        Anticoagulation Patient Findings  Patient Findings     Negatives:   Signs/symptoms of thrombosis, Signs/symptoms of bleeding, Laboratory test error suspected, Change in health, Change in alcohol use, Change in activity, Upcoming invasive procedure, Emergency department visit, Upcoming dental procedure, Missed doses, Extra doses, Change in medications, Change in diet/appetite, Hospital admission, Bruising, Other complaints        HPI:   Arianne Nowakcarolskylar seen in clinic today, on anticoagulation therapy with warfarin for stroke prevention due to history of atrial fibrillation.    Patient's previous INR was supratherapeutic at 3.3 on 11-16-18, at which time patient was instructed to decrease one dose, then resume current warfarin regimen.  She returns to clinic today to recheck INR to ensure it is therapeutic and thus preventing possible clotting and/or bleeding/bruising complications.    CHADS-VASc = at least 4  (unadjusted ischemic stroke risk/year:  4.8%, which is moderate risk)    Does patient have any changes to current medical/health status since last appt (Y/N):  NO  Does patient have any signs/symptoms of bleeding and/or thrombosis since the last appt  "(Y/N):  NO  Does patient have any interval changes to diet or medications since last appt (Y/N):  NO  Are there any complications or cost restrictions with current therapy (Y/N):  NO      Vitals:  /82  HR 89    Weight  declines   Height   5' 2\"     Asssessment:      INR therapeutic at 2.3, therefore decreasing patient's risk of stroke and/or bleeding complications.   Reason(s) for out of range INR today:  n/a      Plan:       Follow up:  Because warfarin is a high risk medication and current CHEST guidelines recommend regular monitoring intervals (few days up to 12 weeks), will have patient return to clinic in 2 weeks to recheck INR.    Gomez Collier, PharmD    "

## 2018-12-13 NOTE — CARE PLAN
Problem: Communication  Goal: The ability to communicate needs accurately and effectively will improve  Outcome: PROGRESSING AS EXPECTED      Problem: Safety  Goal: Will remain free from injury  Outcome: PROGRESSING AS EXPECTED         How Severe Is Your Skin Lesion?: mild Has Your Skin Lesion Been Treated?: not been treated Is This A New Presentation, Or A Follow-Up?: Skin Lesion

## 2018-12-14 ENCOUNTER — ANTICOAGULATION VISIT (OUTPATIENT)
Dept: MEDICAL GROUP | Facility: PHYSICIAN GROUP | Age: 83
End: 2018-12-14
Payer: MEDICARE

## 2018-12-14 VITALS — DIASTOLIC BLOOD PRESSURE: 75 MMHG | HEART RATE: 87 BPM | SYSTOLIC BLOOD PRESSURE: 118 MMHG

## 2018-12-14 DIAGNOSIS — I48.19 PERSISTENT ATRIAL FIBRILLATION (HCC): ICD-10-CM

## 2018-12-14 LAB — INR PPP: 2.2 (ref 2–3.5)

## 2018-12-14 PROCEDURE — 85610 PROTHROMBIN TIME: CPT | Performed by: FAMILY MEDICINE

## 2018-12-14 PROCEDURE — 99999 PR NO CHARGE: CPT | Performed by: FAMILY MEDICINE

## 2018-12-14 NOTE — PROGRESS NOTES
Anticoagulation Summary  As of 12/14/2018    INR goal:   2.0-3.0   TTR:   55.8 % (3.8 mo)   Today's INR:   2.2   Warfarin maintenance plan:   3.75 mg (2.5 mg x 1.5) every day   Weekly warfarin total:   26.25 mg   Plan last modified:   Mj Chin, PharmD (9/6/2018)   Next INR check:   1/11/2019   Target end date:   Indefinite    Indications    Persistent atrial fibrillation (HCC) [I48.1]             Anticoagulation Episode Summary     INR check location:       Preferred lab:       Send INR reminders to:       Comments:   Call Pt's daughter Enrique [phone]       Anticoagulation Care Providers     Provider Role Specialty Phone number    Alonzo Flor M.D. Referring Cardiology [phone]    [facility] Responsible  [phone]        Anticoagulation Patient Findings  Patient Findings     Negatives:   Signs/symptoms of thrombosis, Signs/symptoms of bleeding, Laboratory test error suspected, Change in health, Change in alcohol use, Change in activity, Upcoming invasive procedure, Emergency department visit, Upcoming dental procedure, Missed doses, Extra doses, Change in medications, Change in diet/appetite, Hospital admission, Bruising, Other complaints        HPI:   Arianne Nowakcarolskylar seen in clinic today, on anticoagulation therapy with warfarin for stroke prevention due to history of atrial fibrillation.    Patient's previous INR was therapeutic at 2.3 on 11-30-18, at which time patient was instructed to continue with current warfarin regimen.  She returns to clinic today to recheck INR to ensure it is therapeutic and thus preventing possible clotting and/or bleeding/bruising complications.    CHADS-VASc = at least 4  (unadjusted ischemic stroke risk/year:  4.8%, which is moderate risk)    Does patient have any changes to current medical/health status since last appt (Y/N):  NO  Does patient have any signs/symptoms of bleeding and/or thrombosis since the last appt (Y/N):  NO  Does patient  "have any interval changes to diet or medications since last appt (Y/N):  NO  Are there any complications or cost restrictions with current therapy (Y/N):  NO      Vitals:  /75  HR 87    Weight  declines   Height   5' 2\"     Asssessment:      INR remains therapeutic at 2.2, therefore decreasing patient's risk of stroke and/or bleeding complications.   Reason(s) for out of range INR today:  n/a      Plan:  Pt is to continue with current warfarin dosing regimen in order to maintain INR in therapeutic range.     Follow up:  Because warfarin is a high risk medication and current CHEST guidelines recommend regular monitoring intervals (few days up to 12 weeks), will have patient return to clinic in 4 weeks to recheck INR.    Gomez Collier, PharmD    "

## 2019-01-11 ENCOUNTER — ANTICOAGULATION VISIT (OUTPATIENT)
Dept: MEDICAL GROUP | Facility: PHYSICIAN GROUP | Age: 84
End: 2019-01-11
Payer: MEDICARE

## 2019-01-11 DIAGNOSIS — I48.19 PERSISTENT ATRIAL FIBRILLATION (HCC): ICD-10-CM

## 2019-01-11 LAB — INR PPP: 1.9 (ref 2–3.5)

## 2019-01-11 PROCEDURE — 99211 OFF/OP EST MAY X REQ PHY/QHP: CPT | Performed by: FAMILY MEDICINE

## 2019-01-11 PROCEDURE — 85610 PROTHROMBIN TIME: CPT | Performed by: FAMILY MEDICINE

## 2019-01-11 NOTE — PROGRESS NOTES
Anticoagulation Summary  As of 1/11/2019    INR goal:   2.0-3.0   TTR:   57.9 % (4.7 mo)   Today's INR:   1.9!   Warfarin maintenance plan:   3.75 mg (2.5 mg x 1.5) every day   Weekly warfarin total:   26.25 mg   Plan last modified:   Mj Chin, PharmD (9/6/2018)   Next INR check:   2/8/2019   Target end date:   Indefinite    Indications    Persistent atrial fibrillation (HCC) [I48.1]             Anticoagulation Episode Summary     INR check location:       Preferred lab:       Send INR reminders to:       Comments:   Call Pt's daughter Enrique [phone]       Anticoagulation Care Providers     Provider Role Specialty Phone number    Alonzo Flor M.D. Referring Cardiology [phone]    [facility] Responsible  [phone]        Anticoagulation Patient Findings  Patient Findings     Negatives:   Signs/symptoms of thrombosis, Signs/symptoms of bleeding, Laboratory test error suspected, Change in health, Change in alcohol use, Change in activity, Upcoming invasive procedure, Emergency department visit, Upcoming dental procedure, Missed doses, Extra doses, Change in medications, Change in diet/appetite, Hospital admission, Bruising, Other complaints        HPI:   Arianne Irving seen in clinic today, on anticoagulation therapy with warfarin for stroke prevention due to history of atrial fibrillation.    Patient's previous INR was therapeutic at 2.2 on 12-14-18, at which time patient was instructed to continue with current warfarin regimen.  She returns to clinic today to recheck INR to ensure it is therapeutic and thus preventing possible clotting and/or bleeding/bruising complications.    CHADS-VASc = at least 4  (unadjusted ischemic stroke risk/year:  4.8%, which is moderate risk)    Does patient have any changes to current medical/health status since last appt (Y/N):  NO  Does patient have any signs/symptoms of bleeding and/or thrombosis since the last appt (Y/N):  NO  Does patient  "have any interval changes to diet or medications since last appt (Y/N):  NO  Are there any complications or cost restrictions with current therapy (Y/N):  NO      Vitals:  BP check not completed, machine malfunctioning      Weight  declines   Height   5' 2\"     Asssessment:      INR subtherapeutic at 1.9, therefore increasing patient's risk of stroke due to a-fib.   Reason(s) for out of range INR today:  No identifiable causes for low INR.      Plan:  Instructed patient to bolus with 5mg X 1, then resume current warfarin regimen in order to bring INR to therapeutic range.     Follow up:  Because warfarin is a high risk medication and current CHEST guidelines recommend regular monitoring intervals (few days up to 12 weeks), will have patient return to clinic in 4 weeks to recheck INR.    Gomez Collier, PharmD    "

## 2019-02-08 ENCOUNTER — ANTICOAGULATION VISIT (OUTPATIENT)
Dept: MEDICAL GROUP | Facility: PHYSICIAN GROUP | Age: 84
End: 2019-02-08
Payer: MEDICARE

## 2019-02-08 DIAGNOSIS — I48.19 PERSISTENT ATRIAL FIBRILLATION (HCC): ICD-10-CM

## 2019-02-08 LAB — INR PPP: 2.8 (ref 2–3.5)

## 2019-02-08 PROCEDURE — 85610 PROTHROMBIN TIME: CPT | Performed by: INTERNAL MEDICINE

## 2019-02-08 PROCEDURE — 93793 ANTICOAG MGMT PT WARFARIN: CPT | Performed by: FAMILY MEDICINE

## 2019-02-08 NOTE — PROGRESS NOTES
Anticoagulation Summary  As of 2019    INR goal:   2.0-3.0   TTR:   63.0 % (5.7 mo)   INR used for dosin.8 (2019)   Warfarin maintenance plan:   3.75 mg (2.5 mg x 1.5) every day   Weekly warfarin total:   26.25 mg   Plan last modified:   Mj Chin, PharmD (2018)   Next INR check:   3/15/2019   Target end date:   Indefinite    Indications    Persistent atrial fibrillation (HCC) [I48.1]             Anticoagulation Episode Summary     INR check location:       Preferred lab:       Send INR reminders to:       Comments:   Call Pt's daughter Enrique [phone]       Anticoagulation Care Providers     Provider Role Specialty Phone number    Alonzo Flor M.D. Referring Cardiology [phone]    [facility] Anticoagulation [facility] Responsible  [phone]        Anticoagulation Patient Findings  Patient Findings     Negatives:   Signs/symptoms of thrombosis, Signs/symptoms of bleeding, Laboratory test error suspected, Change in health, Change in alcohol use, Change in activity, Upcoming invasive procedure, Emergency department visit, Upcoming dental procedure, Missed doses, Extra doses, Change in medications, Change in diet/appetite, Hospital admission, Bruising, Other complaints        HPI:   Arianne Nowakcarolyn seen in clinic today, on anticoagulation therapy with warfarin for stroke prevention due to history of atrial fibrillation.    Patient's previous INR was subtherapeutic at 1.9 on 19, at which time patient was instructed to continue with current warfarin regimen.  She returns to clinic today to recheck INR to ensure it is therapeutic and thus preventing possible clotting and/or bleeding/bruising complications.    CHADS-VASc = at least 4  (unadjusted ischemic stroke risk/year:  4.8%, which is moderate risk)    Does patient have any changes to current medical/health status since last appt (Y/N):  NO  Does patient have any signs/symptoms of bleeding and/or thrombosis since the last appt  "(Y/N):  NO  Does patient have any interval changes to diet or medications since last appt (Y/N):  NO  Are there any complications or cost restrictions with current therapy (Y/N):  NO      Vitals:  BP check declined today      Weight  declines   Height   5' 2\"     Asssessment:      INR therapeutic at 2.8, therefore decreasing patient's risk of stroke and/or bleeding complications.   Reason(s) for out of range INR today:  n/a      Plan:  Pt is to continue with current warfarin dosing regimen in order to maintain INR in therapeutic range.     Follow up:  Because warfarin is a high risk medication and current CHEST guidelines recommend regular monitoring intervals (few days up to 12 weeks), will have patient return to clinic in 5 weeks to recheck INR.    Gomez Collier, PharmD    "

## 2019-02-12 ENCOUNTER — HOSPITAL ENCOUNTER (OUTPATIENT)
Dept: LAB | Facility: MEDICAL CENTER | Age: 84
End: 2019-02-12
Attending: FAMILY MEDICINE
Payer: MEDICARE

## 2019-02-12 LAB
ALBUMIN SERPL BCP-MCNC: 4.4 G/DL (ref 3.2–4.9)
ALBUMIN/GLOB SERPL: 1.5 G/DL
ALP SERPL-CCNC: 75 U/L (ref 30–99)
ALT SERPL-CCNC: 18 U/L (ref 2–50)
ANION GAP SERPL CALC-SCNC: 10 MMOL/L (ref 0–11.9)
AST SERPL-CCNC: 19 U/L (ref 12–45)
BASOPHILS # BLD AUTO: 0.8 % (ref 0–1.8)
BASOPHILS # BLD: 0.07 K/UL (ref 0–0.12)
BILIRUB SERPL-MCNC: 1 MG/DL (ref 0.1–1.5)
BUN SERPL-MCNC: 30 MG/DL (ref 8–22)
CALCIUM SERPL-MCNC: 9.4 MG/DL (ref 8.5–10.5)
CHLORIDE SERPL-SCNC: 101 MMOL/L (ref 96–112)
CO2 SERPL-SCNC: 28 MMOL/L (ref 20–33)
CREAT SERPL-MCNC: 1.49 MG/DL (ref 0.5–1.4)
EOSINOPHIL # BLD AUTO: 0.13 K/UL (ref 0–0.51)
EOSINOPHIL NFR BLD: 1.5 % (ref 0–6.9)
ERYTHROCYTE [DISTWIDTH] IN BLOOD BY AUTOMATED COUNT: 49 FL (ref 35.9–50)
GLOBULIN SER CALC-MCNC: 2.9 G/DL (ref 1.9–3.5)
GLUCOSE SERPL-MCNC: 155 MG/DL (ref 65–99)
HCT VFR BLD AUTO: 43.3 % (ref 37–47)
HGB BLD-MCNC: 13.6 G/DL (ref 12–16)
IMM GRANULOCYTES # BLD AUTO: 0.02 K/UL (ref 0–0.11)
IMM GRANULOCYTES NFR BLD AUTO: 0.2 % (ref 0–0.9)
LYMPHOCYTES # BLD AUTO: 1.56 K/UL (ref 1–4.8)
LYMPHOCYTES NFR BLD: 18.4 % (ref 22–41)
MCH RBC QN AUTO: 27.3 PG (ref 27–33)
MCHC RBC AUTO-ENTMCNC: 31.4 G/DL (ref 33.6–35)
MCV RBC AUTO: 86.8 FL (ref 81.4–97.8)
MONOCYTES # BLD AUTO: 0.7 K/UL (ref 0–0.85)
MONOCYTES NFR BLD AUTO: 8.3 % (ref 0–13.4)
NEUTROPHILS # BLD AUTO: 6 K/UL (ref 2–7.15)
NEUTROPHILS NFR BLD: 70.8 % (ref 44–72)
NRBC # BLD AUTO: 0 K/UL
NRBC BLD-RTO: 0 /100 WBC
PLATELET # BLD AUTO: 199 K/UL (ref 164–446)
PMV BLD AUTO: 10.7 FL (ref 9–12.9)
POTASSIUM SERPL-SCNC: 3.5 MMOL/L (ref 3.6–5.5)
PROT SERPL-MCNC: 7.3 G/DL (ref 6–8.2)
RBC # BLD AUTO: 4.99 M/UL (ref 4.2–5.4)
SODIUM SERPL-SCNC: 139 MMOL/L (ref 135–145)
WBC # BLD AUTO: 8.5 K/UL (ref 4.8–10.8)

## 2019-02-12 PROCEDURE — 80053 COMPREHEN METABOLIC PANEL: CPT

## 2019-02-12 PROCEDURE — 85025 COMPLETE CBC W/AUTO DIFF WBC: CPT

## 2019-02-12 PROCEDURE — 36415 COLL VENOUS BLD VENIPUNCTURE: CPT

## 2019-02-12 PROCEDURE — 84443 ASSAY THYROID STIM HORMONE: CPT

## 2019-02-13 ENCOUNTER — HOSPITAL ENCOUNTER (OUTPATIENT)
Facility: MEDICAL CENTER | Age: 84
End: 2019-02-13
Attending: FAMILY MEDICINE
Payer: MEDICARE

## 2019-02-13 LAB
APPEARANCE UR: ABNORMAL
BACTERIA #/AREA URNS HPF: ABNORMAL /HPF
BILIRUB UR QL STRIP.AUTO: NEGATIVE
COLOR UR: YELLOW
EPI CELLS #/AREA URNS HPF: NEGATIVE /HPF
GLUCOSE UR STRIP.AUTO-MCNC: NEGATIVE MG/DL
KETONES UR STRIP.AUTO-MCNC: NEGATIVE MG/DL
LEUKOCYTE ESTERASE UR QL STRIP.AUTO: ABNORMAL
MICRO URNS: ABNORMAL
NITRITE UR QL STRIP.AUTO: NEGATIVE
PH UR STRIP.AUTO: 7 [PH]
PROT UR QL STRIP: 30 MG/DL
RBC # URNS HPF: ABNORMAL /HPF
RBC UR QL AUTO: NEGATIVE
SP GR UR STRIP.AUTO: 1.02
UROBILINOGEN UR STRIP.AUTO-MCNC: 0.2 MG/DL
WBC #/AREA URNS HPF: ABNORMAL /HPF

## 2019-02-13 PROCEDURE — 87086 URINE CULTURE/COLONY COUNT: CPT

## 2019-02-13 PROCEDURE — 81001 URINALYSIS AUTO W/SCOPE: CPT

## 2019-02-13 PROCEDURE — 87186 SC STD MICRODIL/AGAR DIL: CPT

## 2019-02-17 LAB
BACTERIA UR CULT: ABNORMAL
BACTERIA UR CULT: ABNORMAL
SIGNIFICANT IND 70042: ABNORMAL
SITE SITE: ABNORMAL
SOURCE SOURCE: ABNORMAL

## 2019-02-21 LAB — TEST NAME 95000: NORMAL

## 2019-03-15 ENCOUNTER — ANTICOAGULATION VISIT (OUTPATIENT)
Dept: MEDICAL GROUP | Facility: PHYSICIAN GROUP | Age: 84
End: 2019-03-15
Payer: MEDICARE

## 2019-03-15 DIAGNOSIS — I48.19 PERSISTENT ATRIAL FIBRILLATION (HCC): ICD-10-CM

## 2019-03-15 LAB — INR PPP: 2.1 (ref 2–3.5)

## 2019-03-15 PROCEDURE — 85610 PROTHROMBIN TIME: CPT | Mod: GW | Performed by: INTERNAL MEDICINE

## 2019-03-15 PROCEDURE — 93793 ANTICOAG MGMT PT WARFARIN: CPT | Mod: GW | Performed by: INTERNAL MEDICINE

## 2019-03-15 NOTE — PROGRESS NOTES
Anticoagulation Summary  As of 3/15/2019    INR goal:   2.0-3.0   TTR:   69.3 % (6.8 mo)   INR used for dosin.1 (3/15/2019)   Warfarin maintenance plan:   3.75 mg (2.5 mg x 1.5) every day   Weekly warfarin total:   26.25 mg   Plan last modified:   Mj Chin, PharmD (2018)   Next INR check:   2019   Target end date:   Indefinite    Indications    Persistent atrial fibrillation (HCC) [I48.1]             Anticoagulation Episode Summary     INR check location:       Preferred lab:       Send INR reminders to:       Comments:   Call Pt's daughter Enrique [phone]       Anticoagulation Care Providers     Provider Role Specialty Phone number    Alonzo Flor M.D. Referring Cardiology [phone]    [facility] Anticoagulation Services Responsible  [phone]        Anticoagulation Patient Findings  Patient Findings     Negatives:   Signs/symptoms of thrombosis, Signs/symptoms of bleeding, Laboratory test error suspected, Change in health, Change in alcohol use, Change in activity, Upcoming invasive procedure, Emergency department visit, Upcoming dental procedure, Missed doses, Extra doses, Change in medications, Change in diet/appetite, Hospital admission, Bruising, Other complaints        HPI:   Arianne Nowakcarolyn seen in clinic today, on anticoagulation therapy with warfarin for stroke prevention due to history of atrial fibrillation.    Patient's previous INR was therapeutic at 2.8 on 19, at which time patient was instructed to continue with current warfarin regimen.  She returns to clinic today to recheck INR to ensure it is therapeutic and thus preventing possible clotting and/or bleeding/bruising complications.    CHADS-VASc = at least 4  (unadjusted ischemic stroke risk/year:  4.8%, which is moderate risk)    Does patient have any changes to current medical/health status since last appt (Y/N):  NO  Does patient have any signs/symptoms of bleeding and/or thrombosis since the last appt (Y/N):   "NO  Does patient have any interval changes to diet or medications since last appt (Y/N):  NO  Are there any complications or cost restrictions with current therapy (Y/N):  NO      Vitals:  BP check declined today    Weight  declines   Height   5' 2\"     Asssessment:      INR remains therapeutic at 2.1, therefore decreasing patient's risk of stroke and/or bleeding complications.   Reason(s) for out of range INR today:  n/a      Plan:  Pt is to continue with current warfarin dosing regimen in order to maintain INR in therapeutic range.     Follow up:  Because warfarin is a high risk medication and current CHEST guidelines recommend regular monitoring intervals (few days up to 12 weeks), will have patient return to clinic in 7 weeks to recheck INR.    Gomez Collier, PharmD    "

## 2019-05-03 ENCOUNTER — ANTICOAGULATION VISIT (OUTPATIENT)
Dept: MEDICAL GROUP | Facility: PHYSICIAN GROUP | Age: 84
End: 2019-05-03
Payer: MEDICARE

## 2019-05-03 DIAGNOSIS — I48.19 PERSISTENT ATRIAL FIBRILLATION (HCC): ICD-10-CM

## 2019-05-03 LAB — INR PPP: 3.5 (ref 2–3.5)

## 2019-05-03 PROCEDURE — 85610 PROTHROMBIN TIME: CPT | Mod: GW | Performed by: FAMILY MEDICINE

## 2019-05-03 PROCEDURE — 99211 OFF/OP EST MAY X REQ PHY/QHP: CPT | Mod: GW | Performed by: FAMILY MEDICINE

## 2019-05-03 NOTE — PROGRESS NOTES
Anticoagulation Summary  As of 5/3/2019    INR goal:   2.0-3.0   TTR:   68.3 % (8.5 mo)   INR used for dosing:   3.50! (5/3/2019)   Warfarin maintenance plan:   3.75 mg (2.5 mg x 1.5) every day   Weekly warfarin total:   26.25 mg   Plan last modified:   Mj Chin, PharmD (9/6/2018)   Next INR check:   5/17/2019   Target end date:   Indefinite    Indications    Persistent atrial fibrillation (HCC) [I48.1]             Anticoagulation Episode Summary     INR check location:       Preferred lab:       Send INR reminders to:       Comments:   Call Pt's daughter Enrique [phone]       Anticoagulation Care Providers     Provider Role Specialty Phone number    Alonzo Flor M.D. Referring Cardiology [phone]    [facility] Responsible  [phone]        Anticoagulation Patient Findings  Patient Findings     Negatives:   Signs/symptoms of thrombosis, Signs/symptoms of bleeding, Laboratory test error suspected, Change in health, Change in alcohol use, Change in activity, Upcoming invasive procedure, Emergency department visit, Upcoming dental procedure, Missed doses, Extra doses, Change in medications, Change in diet/appetite, Hospital admission, Bruising, Other complaints        HPI:   Arianne Nowakcarolyn seen in clinic today, on anticoagulation therapy with warfarin for stroke prevention due to history of atrial fibrillation.    Patient's previous INR was therapeutic at 2.1 on 3-15-19, at which time patient was instructed to continue with current warfarin regimen.  She returns to clinic today to recheck INR to ensure it is therapeutic and thus preventing possible clotting and/or bleeding/bruising complications.    CHADS-VASc = at least 4  (unadjusted ischemic stroke risk/year:  4.8%, which is moderate risk)    Does patient have any changes to current medical/health status since last appt (Y/N):  No  Does patient have any signs/symptoms of bleeding and/or thrombosis since the last appt (Y/N):  " No  Does patient have any interval changes to diet or medications since last appt (Y/N):  No  Are there any complications or cost restrictions with current therapy (Y/N):  NO      Vitals:  BP check declined, machine malfunction on multiple attempts    Weight  declines   Height   5' 2\"     Asssessment:      INR supratherapeutic at 3.5, therefore increasing patient's risk of bleeding complications due to warfarin.   Reason(s) for out of range INR today:  Etiology unknown.      Plan:  Instructed patient to decrease today's dose to 1.25mg, then resume current warfarin regimen in order to bring INR to therapeutic range.     Follow up:  Because warfarin is a high risk medication and current CHEST guidelines recommend regular monitoring intervals (few days up to 12 weeks), will have patient return to clinic in 2 weeks to recheck INR.    Gomez Collier, PharmD    "

## 2019-05-17 ENCOUNTER — ANTICOAGULATION VISIT (OUTPATIENT)
Dept: MEDICAL GROUP | Facility: PHYSICIAN GROUP | Age: 84
End: 2019-05-17
Payer: MEDICARE

## 2019-05-17 DIAGNOSIS — I48.19 PERSISTENT ATRIAL FIBRILLATION (HCC): ICD-10-CM

## 2019-05-17 LAB — INR PPP: 2.9 (ref 2–3.5)

## 2019-05-17 PROCEDURE — 93793 ANTICOAG MGMT PT WARFARIN: CPT | Mod: GW | Performed by: INTERNAL MEDICINE

## 2019-05-17 PROCEDURE — 85610 PROTHROMBIN TIME: CPT | Mod: GW | Performed by: FAMILY MEDICINE

## 2019-05-17 NOTE — PROGRESS NOTES
Anticoagulation Summary  As of 2019    INR goal:   2.0-3.0   TTR:   65.6 % (8.9 mo)   INR used for dosin.90 (2019)   Warfarin maintenance plan:   3.75 mg (2.5 mg x 1.5) every day   Weekly warfarin total:   26.25 mg   Plan last modified:   Mj Chin, PharmD (2018)   Next INR check:   2019   Target end date:   Indefinite    Indications    Persistent atrial fibrillation (HCC) [I48.1]             Anticoagulation Episode Summary     INR check location:       Preferred lab:       Send INR reminders to:       Comments:   Call Pt's daughter Enrique [phone]       Anticoagulation Care Providers     Provider Role Specialty Phone number    Alonzo Flor M.D. Referring Cardiology [phone]    [facility] Anticoagulation Services Responsible  [phone]        Anticoagulation Patient Findings  Patient Findings     Negatives:   Signs/symptoms of thrombosis, Signs/symptoms of bleeding, Laboratory test error suspected, Change in health, Change in alcohol use, Change in activity, Upcoming invasive procedure, Emergency department visit, Upcoming dental procedure, Missed doses, Extra doses, Change in medications, Change in diet/appetite, Hospital admission, Bruising, Other complaints        HPI:   Arianne Nowakcarolyn seen in clinic today, on anticoagulation therapy with warfarin for stroke prevention due to history of atrial fibrillation.    Patient's previous INR was supratherapeutic at 3.5 on 5-3-19, at which time patient was instructed to decrease one dose, then resume current warfarin regimen.  She returns to clinic today to recheck INR to ensure it is therapeutic and thus preventing possible clotting and/or bleeding/bruising complications.    CHADS-VASc = at least 4  (unadjusted ischemic stroke risk/year:  4.8%, which is moderate risk)    Does patient have any changes to current medical/health status since last appt (Y/N):  NO  Does patient have any signs/symptoms of bleeding and/or thrombosis since  "the last appt (Y/N):  NO  Does patient have any interval changes to diet or medications since last appt (Y/N):  NO  Are there any complications or cost restrictions with current therapy (Y/N):  NO      Vitals:  BP check neglected in error today      Weight  declines   Height   5' 2\"     Asssessment:      INR therapeutic at 2.9, therefore decreasing patient's risk of stroke and/or bleeding complications.   Reason(s) for out of range INR today:  n/a      Plan:  Pt is to continue with current warfarin dosing regimen in order to maintain INR in therapeutic range.     Follow up:  Because warfarin is a high risk medication and current CHEST guidelines recommend regular monitoring intervals (few days up to 12 weeks), will have patient return to clinic in 3 weeks to recheck INR.    Gomez Collier, PharmD    "

## 2019-06-05 ENCOUNTER — APPOINTMENT (OUTPATIENT)
Dept: VASCULAR LAB | Facility: MEDICAL CENTER | Age: 84
End: 2019-06-05
Payer: MEDICARE

## 2019-06-12 ENCOUNTER — ANTICOAGULATION VISIT (OUTPATIENT)
Dept: VASCULAR LAB | Facility: MEDICAL CENTER | Age: 84
End: 2019-06-12
Attending: INTERNAL MEDICINE
Payer: MEDICARE

## 2019-06-12 VITALS — SYSTOLIC BLOOD PRESSURE: 123 MMHG | HEART RATE: 89 BPM | DIASTOLIC BLOOD PRESSURE: 78 MMHG

## 2019-06-12 DIAGNOSIS — I48.19 PERSISTENT ATRIAL FIBRILLATION (HCC): ICD-10-CM

## 2019-06-12 LAB
INR BLD: 2.3 (ref 0.9–1.2)
INR PPP: 2.3 (ref 2–3.5)

## 2019-06-12 PROCEDURE — 99211 OFF/OP EST MAY X REQ PHY/QHP: CPT | Performed by: NURSE PRACTITIONER

## 2019-06-12 PROCEDURE — 85610 PROTHROMBIN TIME: CPT

## 2019-06-12 NOTE — PROGRESS NOTES
Anticoagulation Summary  As of 2019    INR goal:   2.0-3.0   TTR:   68.6 % (9.8 mo)   INR used for dosin.30 (2019)   Warfarin maintenance plan:   3.75 mg (2.5 mg x 1.5) every day   Weekly warfarin total:   26.25 mg   No change documented:   ORVILLE Sanchez   Plan last modified:   Mj Chin, PharmD (2018)   Next INR check:   7/10/2019   Target end date:   Indefinite    Indications    Persistent atrial fibrillation (HCC) [I48.1]             Anticoagulation Episode Summary     INR check location:       Preferred lab:       Send INR reminders to:       Comments:   Call Pt's daughter Enrique [phone]       Anticoagulation Care Providers     Provider Role Specialty Phone number    Alonzo Flor M.D. Referring Cardiology [phone]    [facility] Anticoagulation Services Responsible  [phone]        Anticoagulation Patient Findings      HPI:  Arianne Irving seen in clinic today for follow up on anticoagulation therapy in the presence of AF. Denies any changes to current medical/health status since last appointment. Denies any medication or diet changes. No current symptoms of bleeding or thrombosis reported.    A/P:   INR therapeutic. Continue current regimen. BP recorded in vitals.    Follow up appointment in 4 week(s).    Next Appointment: Wednesday, July 10, 2019 at 3:45 pm.    Sherice CORTEZ

## 2019-07-10 ENCOUNTER — ANTICOAGULATION VISIT (OUTPATIENT)
Dept: VASCULAR LAB | Facility: MEDICAL CENTER | Age: 84
End: 2019-07-10
Attending: INTERNAL MEDICINE
Payer: MEDICARE

## 2019-07-10 DIAGNOSIS — I48.19 PERSISTENT ATRIAL FIBRILLATION (HCC): ICD-10-CM

## 2019-07-10 LAB — INR PPP: 2.6 (ref 2–3.5)

## 2019-07-10 PROCEDURE — 85610 PROTHROMBIN TIME: CPT

## 2019-07-10 PROCEDURE — 99211 OFF/OP EST MAY X REQ PHY/QHP: CPT | Performed by: PHARMACIST

## 2019-07-10 NOTE — PROGRESS NOTES
Anticoagulation Summary  As of 7/10/2019    INR goal:   2.0-3.0   TTR:   71.3 % (10.7 mo)   INR used for dosin.60 (7/10/2019)   Warfarin maintenance plan:   3.75 mg (2.5 mg x 1.5) every day   Weekly warfarin total:   26.25 mg   Plan last modified:   Mj Chin, PharmD (2018)   Next INR check:   2019   Target end date:   Indefinite    Indications    Persistent atrial fibrillation (HCC) [I48.1]             Anticoagulation Episode Summary     INR check location:       Preferred lab:       Send INR reminders to:       Comments:   Call Pt's daughter Enrique [phone]       Anticoagulation Care Providers     Provider Role Specialty Phone number    Alonzo Flor M.D. Referring Cardiology [phone]    [facility] Anticoagulation Services Responsible  [phone]          Anticoagulation Patient Findings  Patient Findings     Negatives:   Signs/symptoms of thrombosis, Signs/symptoms of bleeding, Laboratory test error suspected, Change in health, Change in alcohol use, Change in activity, Upcoming invasive procedure, Emergency department visit, Upcoming dental procedure, Missed doses, Extra doses, Change in medications, Change in diet/appetite, Hospital admission, Bruising, Other complaints          HPI:  Arianne Aristides seen in clinic today, on anticoagulation therapy with warfarin for stroke prevention due to hx of a-fib.  Changes to current medical/health status since last appt: NONE  Denies signs/symptoms of bleeding and/or thrombosis since the last appt.    Denies any interval changes to diet  Denies any interval changes to medications since last appt.   Denies any complications or cost restrictions with current therapy.   BP recorded in vitals.       A/P   INR  remains-therapeutic 2.6.   Pt is to continue with current warfarin dosing regimen.    Follow up appointment in 6 week(s).    Gomez Collier, RigoD

## 2019-07-15 LAB — INR BLD: 2.6 (ref 0.9–1.2)

## 2019-07-24 DIAGNOSIS — Z79.01 CHRONIC ANTICOAGULATION: ICD-10-CM

## 2019-08-21 ENCOUNTER — ANTICOAGULATION VISIT (OUTPATIENT)
Dept: VASCULAR LAB | Facility: MEDICAL CENTER | Age: 84
End: 2019-08-21
Attending: INTERNAL MEDICINE
Payer: MEDICARE

## 2019-08-21 VITALS — DIASTOLIC BLOOD PRESSURE: 77 MMHG | SYSTOLIC BLOOD PRESSURE: 139 MMHG | HEART RATE: 82 BPM

## 2019-08-21 DIAGNOSIS — I48.19 PERSISTENT ATRIAL FIBRILLATION (HCC): ICD-10-CM

## 2019-08-21 LAB — INR PPP: 3.1 (ref 2–3.5)

## 2019-08-21 PROCEDURE — 85610 PROTHROMBIN TIME: CPT

## 2019-08-21 PROCEDURE — 99212 OFFICE O/P EST SF 10 MIN: CPT | Performed by: PHARMACIST

## 2019-08-21 NOTE — PROGRESS NOTES
Anticoagulation Summary  As of 8/21/2019    INR goal:   2.0-3.0   TTR:   72.3 % (1 y)   INR used for dosing:   3.10! (8/21/2019)   Warfarin maintenance plan:   3.75 mg (2.5 mg x 1.5) every day   Weekly warfarin total:   26.25 mg   Plan last modified:   Mj Chin, PharmD (9/6/2018)   Next INR check:   9/18/2019   Target end date:   Indefinite    Indications    Persistent atrial fibrillation (HCC) [I48.1]             Anticoagulation Episode Summary     INR check location:       Preferred lab:       Send INR reminders to:       Comments:   Call Pt's daughter Enrique [phone]       Anticoagulation Care Providers     Provider Role Specialty Phone number    Alonzo Flor M.D. Referring Cardiology [phone]    [facility] Anticoagulation Services Responsible  [phone]                Anticoagulation Patient Findings      HPI:  Arianne Aristides seen in clinic today, on anticoagulation therapy with Warfarin for Persistent AFIB.  Changes to current medical/health status since last appt: none  Denies signs/symptoms of bleeding and/or thrombosis since the last appt.    Denies any interval changes to diet  Denies any interval changes to medications since last appt.   Denies any complications or cost restrictions with current therapy.   BP recorded in vitals. 139/77      A/P   INR  3.1 SUPRA-therapeutic.   Take 2.5 mg today then continue current regimen.     Follow up appointment in 4 week(s).    David Lara, Pharmacy Intern    Gomez Clolier, PharmD

## 2019-08-22 LAB — INR BLD: 3.1 (ref 0.9–1.2)

## 2019-08-27 ENCOUNTER — TELEPHONE (OUTPATIENT)
Dept: VASCULAR LAB | Facility: MEDICAL CENTER | Age: 84
End: 2019-08-27

## 2019-08-27 NOTE — TELEPHONE ENCOUNTER
Home INR monitoring application sent to [employer].    Alex Sanford. [facility]  [facility]

## 2019-09-03 LAB — INR PPP: 3.1 (ref 2–3.5)

## 2019-09-04 ENCOUNTER — ANTICOAGULATION MONITORING (OUTPATIENT)
Dept: VASCULAR LAB | Facility: MEDICAL CENTER | Age: 84
End: 2019-09-04

## 2019-09-04 DIAGNOSIS — I48.19 PERSISTENT ATRIAL FIBRILLATION (HCC): ICD-10-CM

## 2019-09-04 NOTE — PROGRESS NOTES
Anticoagulation Summary  As of 9/4/2019    INR goal:   2.0-3.0   TTR:   69.8 % (1 y)   INR used for dosing:   3.10! (9/3/2019)   Warfarin maintenance plan:   2.5 mg (2.5 mg x 1) every Wed; 3.75 mg (2.5 mg x 1.5) all other days   Weekly warfarin total:   25 mg   Plan last modified:   Rigo StocktonD (9/4/2019)   Next INR check:   9/17/2019   Target end date:   Indefinite    Indications    Persistent atrial fibrillation (HCC) [I48.1]             Anticoagulation Episode Summary     INR check location:       Preferred lab:       Send INR reminders to:       Comments:   ACELIS;   Call Pt's daughter Enrique [phone]       Anticoagulation Care Providers     Provider Role Specialty Phone number    Alonzo Flor M.D. Referring Cardiology [phone]    [facility] Anticoagulation Services Responsible  [phone]        Anticoagulation Patient Findings  Patient Findings     Negatives:   Signs/symptoms of thrombosis, Signs/symptoms of bleeding, Laboratory test error suspected, Change in health, Change in alcohol use, Change in activity, Upcoming invasive procedure, Emergency department visit, Upcoming dental procedure, Missed doses, Extra doses, Change in medications, Change in diet/appetite, Hospital admission, Bruising, Other complaints         Spoke with patients rubén Nunez today regarding supratherapeutic INR of 3.1.  Patient denies any signs/symptoms of bruising or bleeding or any changes in diet and medications.  Instructed patient to call clinic with any questions or concerns.  Instructed patient to decrease weekly warfarin regimen by ~5% as detailed above.  Follow up in 2 weeks, to reduce risk of adverse events related to this high risk medication,  Warfarin.    Gomez Collier, PharmD, BCACP

## 2019-09-17 ENCOUNTER — ANTICOAGULATION MONITORING (OUTPATIENT)
Dept: VASCULAR LAB | Facility: MEDICAL CENTER | Age: 84
End: 2019-09-17

## 2019-09-17 DIAGNOSIS — I48.19 PERSISTENT ATRIAL FIBRILLATION (HCC): ICD-10-CM

## 2019-09-17 LAB — INR PPP: 4.2 (ref 2–3.5)

## 2019-09-17 NOTE — PROGRESS NOTES
Anticoagulation Summary  As of 2019    INR goal:   2.0-3.0   TTR:   67.3 % (1.1 y)   INR used for dosin.20! (2019)   Warfarin maintenance plan:   2.5 mg (2.5 mg x 1) every Mon, Wed, Fri; 3.75 mg (2.5 mg x 1.5) all other days   Weekly warfarin total:   22.5 mg   Plan last modified:   Lenora Tinoco (2019)   Next INR check:   10/1/2019   Target end date:   Indefinite    Indications    Persistent atrial fibrillation (HCC) [I48.1]             Anticoagulation Episode Summary     INR check location:       Preferred lab:       Send INR reminders to:       Comments:   DARREN;   Call Pt's daughter Enrique [phone]       Anticoagulation Care Providers     Provider Role Specialty Phone number    Alonzo Flro M.D. Referring Cardiology [phone]    [facility] Anticoagulation Services Responsible  [phone]        Anticoagulation Patient Findings          Spoke with Enrique to report a supra therapeutic INR of 4.2.  INRs have been trending upward.  Will HOLD warfarin tonight, then reduce weekly dose by 10%. Follow up in 2 weeks, to reduce the risk of adverse events related to this high risk medication, warfarin.    Lenora Tinoco, Clinical Pharmacist

## 2019-09-25 ENCOUNTER — TELEPHONE (OUTPATIENT)
Dept: VASCULAR LAB | Facility: MEDICAL CENTER | Age: 84
End: 2019-09-25

## 2019-09-25 NOTE — TELEPHONE ENCOUNTER
Daughter, Bijan, left  stating that pt passed away on 9/23. If any questions, [phone] (daughter's cell).

## 2019-10-02 ENCOUNTER — ANTICOAGULATION MONITORING (OUTPATIENT)
Dept: VASCULAR LAB | Facility: MEDICAL CENTER | Age: 84
End: 2019-10-02

## 2019-10-02 DIAGNOSIS — I48.19 PERSISTENT ATRIAL FIBRILLATION (HCC): ICD-10-CM

## 2019-10-02 NOTE — PROGRESS NOTES
Discharged from [facility].  Patient has .  Lenora Tinoco, Clinical Pharmacist, CDE, CACP

## 2020-01-08 NOTE — PROGRESS NOTES
Anticoagulation Summary  As of 10/5/2018    INR goal:   2.0-3.0   TTR:   38.3 % (1.5 mo)   Today's INR:   2.8   Warfarin maintenance plan:   3.75 mg (2.5 mg x 1.5) every day   Weekly warfarin total:   26.25 mg   Plan last modified:   Mj Chin, PharmD (9/6/2018)   Next INR check:   10/12/2018   Target end date:   Indefinite    Indications    Persistent atrial fibrillation (HCC) [I48.1]             Anticoagulation Episode Summary     INR check location:       Preferred lab:       Send INR reminders to:       Comments:   Call Pt's daughter Enrique [phone]       Anticoagulation Care Providers     Provider Role Specialty Phone number    Alonzo Flor M.D. Referring Cardiology [phone]    [facility] Anticoagulation Services Responsible  [phone]        Anticoagulation Patient Findings  Patient Findings     Positives:   Hospital admission    Comments:   UTI        Spoke with patients daughter today regarding therapeutic INR of 2.8.  Patient denies any signs/symptoms of bruising or bleeding or any changes in diet and medications.  Instructed patient to call clinic with any questions or concerns.  Pt is to continue with current warfarin dosing regimen.  Follow up in 1 weeks, to reduce risk of adverse events related to this high risk medication,  Warfarin.    Gomez Collier, PharmD       Spoke with pt and scheduled her vaccines

## 2023-09-06 NOTE — PROCEDURE: LIQUID NITROGEN (COSMETIC)
Consent: The patient's consent was obtained including but not limited to risks of crusting, scabbing, blistering, scarring, darker or lighter pigmentary change, recurrence, incomplete removal and infection. The patient understands that the procedure is cosmetic in nature and is not covered by insurance.
Post-Care Instructions: I reviewed with the patient in detail post-care instructions. Patient is to wear sunprotection, and avoid picking at any of the treated lesions. Pt may apply Vaseline to crusted or scabbing areas.
Price (Use Numbers Only, No Special Characters Or $): 0
Detail Level: Detailed
Render Post-Care Instructions In Note?: no
Quality 431: Preventive Care And Screening: Unhealthy Alcohol Use - Screening: Patient not identified as an unhealthy alcohol user when screened for unhealthy alcohol use using a systematic screening method
Quality 226: Preventive Care And Screening: Tobacco Use: Screening And Cessation Intervention: Patient screened for tobacco use and is an ex/non-smoker
Detail Level: Detailed